# Patient Record
Sex: MALE | Race: ASIAN | NOT HISPANIC OR LATINO | Employment: FULL TIME | URBAN - METROPOLITAN AREA
[De-identification: names, ages, dates, MRNs, and addresses within clinical notes are randomized per-mention and may not be internally consistent; named-entity substitution may affect disease eponyms.]

---

## 2017-01-26 RX ORDER — ISOSORBIDE MONONITRATE 60 MG/1
TABLET, EXTENDED RELEASE ORAL
Qty: 30 TABLET | Refills: 3 | Status: SHIPPED | OUTPATIENT
Start: 2017-01-26 | End: 2017-05-26 | Stop reason: SDUPTHER

## 2017-01-31 RX ORDER — LEVOTHYROXINE SODIUM 125 MCG
TABLET ORAL
Qty: 60 TABLET | Refills: 2 | Status: SHIPPED | OUTPATIENT
Start: 2017-01-31 | End: 2017-04-28 | Stop reason: SDUPTHER

## 2017-04-17 DIAGNOSIS — E03.9 HYPOTHYROIDISM, UNSPECIFIED TYPE: ICD-10-CM

## 2017-04-17 DIAGNOSIS — E29.1 HYPOGONADISM IN MALE: ICD-10-CM

## 2017-04-17 DIAGNOSIS — IMO0001 UNCONTROLLED DIABETES MELLITUS TYPE 2 WITHOUT COMPLICATIONS, UNSPECIFIED LONG TERM INSULIN USE STATUS: ICD-10-CM

## 2017-04-17 DIAGNOSIS — E78.5 HYPERLIPIDEMIA, UNSPECIFIED HYPERLIPIDEMIA TYPE: Primary | ICD-10-CM

## 2017-04-28 RX ORDER — LEVOTHYROXINE SODIUM 125 MCG
TABLET ORAL
Qty: 60 TABLET | Refills: 1 | Status: SHIPPED | OUTPATIENT
Start: 2017-04-28 | End: 2017-06-29 | Stop reason: SDUPTHER

## 2017-05-17 ENCOUNTER — RESULTS ENCOUNTER (OUTPATIENT)
Dept: ENDOCRINOLOGY | Age: 42
End: 2017-05-17

## 2017-05-17 DIAGNOSIS — IMO0001 UNCONTROLLED DIABETES MELLITUS TYPE 2 WITHOUT COMPLICATIONS, UNSPECIFIED LONG TERM INSULIN USE STATUS: ICD-10-CM

## 2017-05-17 DIAGNOSIS — E78.5 HYPERLIPIDEMIA, UNSPECIFIED HYPERLIPIDEMIA TYPE: ICD-10-CM

## 2017-05-17 DIAGNOSIS — E03.9 HYPOTHYROIDISM, UNSPECIFIED TYPE: ICD-10-CM

## 2017-05-17 DIAGNOSIS — E29.1 HYPOGONADISM IN MALE: ICD-10-CM

## 2017-05-26 RX ORDER — ISOSORBIDE MONONITRATE 60 MG/1
TABLET, EXTENDED RELEASE ORAL
Qty: 30 TABLET | Refills: 2 | Status: SHIPPED | OUTPATIENT
Start: 2017-05-26 | End: 2017-08-29 | Stop reason: SDUPTHER

## 2017-06-27 RX ORDER — NITROGLYCERIN 400 UG/1
SPRAY ORAL
Qty: 1 EACH | Refills: 0 | Status: SHIPPED | OUTPATIENT
Start: 2017-06-27 | End: 2017-11-06

## 2017-06-29 RX ORDER — LEVOTHYROXINE SODIUM 125 MCG
TABLET ORAL
Qty: 60 TABLET | Refills: 0 | Status: CANCELLED | OUTPATIENT
Start: 2017-06-29

## 2017-06-29 RX ORDER — LEVOTHYROXINE SODIUM 125 MCG
TABLET ORAL
Qty: 60 TABLET | Refills: 0 | Status: SHIPPED | OUTPATIENT
Start: 2017-06-29 | End: 2017-07-03 | Stop reason: SDUPTHER

## 2017-07-03 RX ORDER — LEVOTHYROXINE SODIUM 125 MCG
TABLET ORAL
Qty: 60 TABLET | Refills: 0 | Status: SHIPPED | OUTPATIENT
Start: 2017-07-03 | End: 2017-07-27

## 2017-07-17 LAB
25(OH)D3+25(OH)D2 SERPL-MCNC: 42.2 NG/ML (ref 30–100)
ALBUMIN SERPL-MCNC: 4.3 G/DL (ref 3.5–5.2)
ALBUMIN/GLOB SERPL: 1.3 G/DL
ALP SERPL-CCNC: 118 U/L (ref 39–117)
ALT SERPL-CCNC: 59 U/L (ref 1–41)
AST SERPL-CCNC: 33 U/L (ref 1–40)
BILIRUB SERPL-MCNC: 0.3 MG/DL (ref 0.1–1.2)
BUN SERPL-MCNC: 10 MG/DL (ref 6–20)
BUN/CREAT SERPL: 14.3 (ref 7–25)
C PEPTIDE SERPL-MCNC: 7.3 NG/ML (ref 1.1–4.4)
CALCIUM SERPL-MCNC: 10 MG/DL (ref 8.6–10.5)
CHLORIDE SERPL-SCNC: 97 MMOL/L (ref 98–107)
CHOLEST SERPL-MCNC: 125 MG/DL (ref 0–200)
CO2 SERPL-SCNC: 23.8 MMOL/L (ref 22–29)
CONV COMMENT: ABNORMAL
CREAT SERPL-MCNC: 0.7 MG/DL (ref 0.76–1.27)
FT4I SERPL CALC-MCNC: 2 (ref 1.2–4.9)
GLOBULIN SER CALC-MCNC: 3.3 GM/DL
GLUCOSE SERPL-MCNC: 161 MG/DL (ref 65–99)
HBA1C MFR BLD: 8.37 % (ref 4.8–5.6)
HCT VFR BLD AUTO: 40.8 % (ref 40.4–52.2)
HDLC SERPL-MCNC: 33 MG/DL (ref 40–60)
HGB BLD-MCNC: 13.3 G/DL (ref 13.7–17.6)
LDLC SERPL CALC-MCNC: 57 MG/DL (ref 0–100)
MICROALBUMIN UR-MCNC: 4.6 UG/ML
POTASSIUM SERPL-SCNC: 4.3 MMOL/L (ref 3.5–5.2)
PROT SERPL-MCNC: 7.6 G/DL (ref 6–8.5)
PSA SERPL-MCNC: 0.07 NG/ML (ref 0–4)
SHBG SERPL-SCNC: 29.8 NMOL/L (ref 16.5–55.9)
SODIUM SERPL-SCNC: 137 MMOL/L (ref 136–145)
T3RU NFR SERPL: 25 % (ref 24–39)
T4 FREE SERPL-MCNC: 1.15 NG/DL (ref 0.93–1.7)
T4 SERPL-MCNC: 7.8 UG/DL (ref 4.5–12)
TESTOST FREE SERPL-MCNC: 5.6 PG/ML (ref 6.8–21.5)
TESTOST SERPL-MCNC: 140 NG/DL (ref 348–1197)
THYROGLOB AB SERPL-ACNC: <1 IU/ML
THYROGLOB AB SERPL-ACNC: <1 IU/ML (ref 0–0.9)
THYROGLOB SERPL-MCNC: 0.2 NG/ML
THYROGLOB SERPL-MCNC: NORMAL NG/ML
THYROPEROXIDASE AB SERPL-ACNC: 16 IU/ML (ref 0–34)
TRIGL SERPL-MCNC: 174 MG/DL (ref 0–150)
TSH SERPL DL<=0.005 MIU/L-ACNC: 6.43 UIU/ML (ref 0.45–4.5)
VLDLC SERPL CALC-MCNC: 34.8 MG/DL (ref 5–40)

## 2017-07-27 ENCOUNTER — OFFICE VISIT (OUTPATIENT)
Dept: ENDOCRINOLOGY | Age: 42
End: 2017-07-27

## 2017-07-27 VITALS
HEIGHT: 74 IN | SYSTOLIC BLOOD PRESSURE: 130 MMHG | WEIGHT: 315 LBS | BODY MASS INDEX: 40.43 KG/M2 | DIASTOLIC BLOOD PRESSURE: 80 MMHG

## 2017-07-27 DIAGNOSIS — E03.9 HYPOTHYROIDISM, UNSPECIFIED TYPE: ICD-10-CM

## 2017-07-27 DIAGNOSIS — E78.5 HYPERLIPIDEMIA, UNSPECIFIED HYPERLIPIDEMIA TYPE: ICD-10-CM

## 2017-07-27 DIAGNOSIS — IMO0001 UNCONTROLLED TYPE 2 DIABETES MELLITUS WITHOUT COMPLICATION, WITH LONG-TERM CURRENT USE OF INSULIN: Primary | ICD-10-CM

## 2017-07-27 DIAGNOSIS — I10 ESSENTIAL HYPERTENSION: ICD-10-CM

## 2017-07-27 DIAGNOSIS — E29.1 HYPOGONADISM IN MALE: ICD-10-CM

## 2017-07-27 DIAGNOSIS — E66.01 MORBID OBESITY DUE TO EXCESS CALORIES (HCC): ICD-10-CM

## 2017-07-27 PROCEDURE — 99214 OFFICE O/P EST MOD 30 MIN: CPT | Performed by: NURSE PRACTITIONER

## 2017-07-27 RX ORDER — FLUTICASONE PROPIONATE 50 MCG
1 SPRAY, SUSPENSION (ML) NASAL AS NEEDED
COMMUNITY
Start: 2017-06-28

## 2017-07-27 RX ORDER — IBUPROFEN 200 MG
CAPSULE ORAL AS NEEDED
COMMUNITY
Start: 2017-06-28 | End: 2022-10-08 | Stop reason: HOSPADM

## 2017-07-27 RX ORDER — LEVOTHYROXINE SODIUM 150 MCG
300 TABLET ORAL DAILY
Qty: 60 TABLET | Refills: 5 | Status: SHIPPED | OUTPATIENT
Start: 2017-07-27 | End: 2017-07-31 | Stop reason: SDUPTHER

## 2017-07-27 RX ORDER — ICOSAPENT ETHYL 1000 MG/1
2 CAPSULE ORAL 2 TIMES DAILY WITH MEALS
Qty: 120 CAPSULE | Refills: 5 | Status: SHIPPED | OUTPATIENT
Start: 2017-07-27 | End: 2017-11-06

## 2017-07-27 RX ORDER — OXYBUTYNIN CHLORIDE 10 MG/1
10 TABLET, EXTENDED RELEASE ORAL AS NEEDED
COMMUNITY
Start: 2017-07-09 | End: 2022-10-08 | Stop reason: HOSPADM

## 2017-07-27 RX ORDER — BLOOD SUGAR DIAGNOSTIC
STRIP MISCELLANEOUS
COMMUNITY
Start: 2017-07-26 | End: 2018-04-26 | Stop reason: SDUPTHER

## 2017-07-27 RX ORDER — LANCETS
EACH MISCELLANEOUS
COMMUNITY
Start: 2017-07-06 | End: 2018-04-26 | Stop reason: SDUPTHER

## 2017-07-27 RX ORDER — ERYTHROMYCIN AND BENZOYL PEROXIDE 30; 50 MG/G; MG/G
GEL TOPICAL AS NEEDED
COMMUNITY
Start: 2017-06-27

## 2017-07-27 RX ORDER — L. ACIDOPHILUS/L.BULGARICUS 1MM CELL
1 TABLET ORAL AS NEEDED
Status: ON HOLD | COMMUNITY
Start: 2017-06-29 | End: 2022-10-07

## 2017-07-27 RX ORDER — TRIAMCINOLONE ACETONIDE 1 MG/G
CREAM TOPICAL AS NEEDED
COMMUNITY
Start: 2017-06-27 | End: 2017-10-31 | Stop reason: SDUPTHER

## 2017-07-27 RX ORDER — IRBESARTAN AND HYDROCHLOROTHIAZIDE 300; 12.5 MG/1; MG/1
1 TABLET, FILM COATED ORAL DAILY
COMMUNITY
Start: 2017-07-06 | End: 2017-07-27 | Stop reason: DRUGHIGH

## 2017-07-27 RX ORDER — CLOBETASOL PROPIONATE 0.5 MG/G
CREAM TOPICAL AS NEEDED
COMMUNITY
Start: 2017-06-27 | End: 2017-10-31 | Stop reason: SDUPTHER

## 2017-07-27 RX ORDER — FOLIC ACID 1 MG/1
1 TABLET ORAL DAILY
COMMUNITY
Start: 2017-07-06 | End: 2022-10-08 | Stop reason: HOSPADM

## 2017-07-27 RX ORDER — ATORVASTATIN CALCIUM 40 MG/1
40 TABLET, FILM COATED ORAL DAILY
COMMUNITY
Start: 2017-05-03 | End: 2022-10-08 | Stop reason: HOSPADM

## 2017-07-27 RX ORDER — IRBESARTAN AND HYDROCHLOROTHIAZIDE 300; 12.5 MG/1; MG/1
1 TABLET, FILM COATED ORAL DAILY
COMMUNITY
End: 2019-10-03 | Stop reason: HOSPADM

## 2017-07-27 RX ORDER — LEVOTHYROXINE SODIUM 150 MCG
150 TABLET ORAL DAILY
Qty: 30 TABLET | Refills: 5 | Status: SHIPPED | OUTPATIENT
Start: 2017-07-27 | End: 2017-07-27 | Stop reason: SDUPTHER

## 2017-07-27 RX ORDER — PSYLLIUM SEED
PACKET (EA) ORAL 2 TIMES DAILY
COMMUNITY
Start: 2017-06-28

## 2017-07-27 RX ORDER — EMPAGLIFLOZIN 25 MG/1
1 TABLET, FILM COATED ORAL DAILY
Qty: 30 TABLET | Refills: 5 | Status: SHIPPED | OUTPATIENT
Start: 2017-07-27 | End: 2017-07-28 | Stop reason: SDUPTHER

## 2017-07-27 NOTE — PROGRESS NOTES
"Subjective   Jonel Garza is a 42 y.o. male is here today for follow-up.  Chief Complaint   Patient presents with   • Diabetes     recent labs, testing Bg 3 times daily, pt did not bring meter   • Hypothyroidism     patient has a form to be signed off on   • Hypogonadism   • Hyperlipidemia     /80  Ht 74\" (188 cm)  Wt (!) 352 lb 6.4 oz (160 kg)  BMI 45.25 kg/m2  Current Outpatient Prescriptions on File Prior to Visit   Medication Sig   • aspirin 81 MG tablet Take 81 mg by mouth daily.   • cetirizine (ZyrTEC) 10 MG tablet Take 10 mg by mouth Daily.   • CLICKFINE PEN NEEDLES 31G X 6 MM misc    • ezetimibe (ZETIA) 10 MG tablet Take 10 mg by mouth daily.   • Insulin Glargine (LANTUS SOLOSTAR) 100 UNIT/ML injection pen Inject 110 Units under the skin 2 (Two) Times a Day. PA HAS BEEN APPROVED (Patient taking differently: Inject 120 Units under the skin 2 (Two) Times a Day. PA HAS BEEN APPROVED)   • Insulin Lispro (HUMALOG) 100 UNIT/ML solution pen-injector UP TO30 UNITS AC MEALS TID (Patient taking differently: Inject 40 Units under the skin 3 (Three) Times a Day. UP TO30 UNITS AC MEALS TID)   • isosorbide mononitrate (IMDUR) 60 MG 24 hr tablet TAKE ONE TABLET BY MOUTH DAILY   • metFORMIN (FORTAMET) 1000 MG (OSM) 24 hr tablet 2 PO DAILY WITH FOOD   • metoprolol succinate XL (TOPROL-XL) 100 MG 24 hr tablet Take 100 mg by mouth daily.   • nitroglycerin (NITROLINGUAL) 0.4 MG/SPRAY spray USE ONE SPRAY UNDER THE TONGUE AS NEEDED FOR CHEST PAIN   • nystatin (MYCOSTATIN) cream Apply topically 2 (two) times a day.   • prasugrel (EFFIENT) 10 MG tablet Take 1 tablet by mouth daily.   • sertraline (ZOLOFT) 100 MG tablet Take 100 mg by mouth daily.   • [DISCONTINUED] SYNTHROID 125 MCG tablet Take two tablets by mouth daily. PATIENT MUST KEEP APPT IN ORDER TO OBTAIN FURTHER REFILLS   • [DISCONTINUED] atorvastatin (LIPITOR) 80 MG tablet Take 1 tablet by mouth daily. (Patient taking differently: Take 40 mg by mouth Daily.)   • " [DISCONTINUED] Canagliflozin 100 MG tablet Take 1 tablet daily   • [DISCONTINUED] irbesartan (AVAPRO) 300 MG tablet Take 300 mg by mouth Daily.     No current facility-administered medications on file prior to visit.      Family History   Problem Relation Age of Onset   • Hypertension Mother    • Cancer Mother      Pancreatic   • Heart disease Mother    • Heart disease Father    • Hypertension Sister    • Hypertension Brother    • Diabetes Brother      type 2     Social History   Substance Use Topics   • Smoking status: Never Smoker   • Smokeless tobacco: None   • Alcohol use No     Allergies   Allergen Reactions   • Ace Inhibitors Anaphylaxis   • Iodinated Diagnostic Agents Nausea And Vomiting   • Quinapril Anaphylaxis and Swelling     angioedema   • Accupril [Quinapril Hcl]    • Crestor [Rosuvastatin]    • Dobutamine Nausea And Vomiting   • Rosuvastatin Calcium      Other reaction(s): Other (See Comments)  rhabdomyolisis   • Rosuvastatin Calcium Other (See Comments)     rhabdomyolisis   • Latex Rash       History of Present Illness   Encounter Diagnoses   Name Primary?   • Uncontrolled type 2 diabetes mellitus without complication, with long-term current use of insulin Yes   • Essential hypertension    • Hyperlipidemia, unspecified hyperlipidemia type    • Hypogonadism in male    • Morbid obesity due to excess calories    • Hypothyroidism, unspecified type      This is a 42-year-old male patient here today for routine follow-up visit.  He has been seen for the above-mentioned problems.  He is accompanied by his girlfriend at today's visit.  He did not bring his blood glucose meter in.  He states his morning fasting blood sugars are typically in the 140 range.  He states he is seeing high postprandial blood sugars of around 210-220 mg/dL.  He states he is checking his blood sugars 4 times daily.  He is having to correct for high blood sugars roughly 3 times daily.  We discussed being more proactive with preventing  his blood sugars getting too high rather than reactive to chasing them.  He is willing to attend diabetes education classes.  He was provided a schedule at today's visit.  His triglycerides are slightly elevated and he was prescribed vascepa 1 g 2 pills daily.  He was prescribed Jardiance to help bring his diabetes under better control.  His hemoglobin A1c has gone up and is not at goal.  His most recent TSH reflex that he is hypothyroid.  He states he takes his thyroid medication daily consistently.  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    The following portions of the patient's history were reviewed and updated as appropriate: allergies, current medications, past family history, past medical history, past social history, past surgical history and problem list.    Review of Systems   Constitutional: Negative for fatigue.   HENT: Negative for trouble swallowing.    Eyes: Negative for visual disturbance.   Respiratory: Negative for shortness of breath.    Cardiovascular: Negative for leg swelling.   Endocrine: Negative for polyphagia.   Skin: Negative for wound.   Neurological: Negative for numbness.       Objective   Physical Exam   Constitutional: He is oriented to person, place, and time. He appears well-developed and well-nourished. No distress.   HENT:   Head: Normocephalic and atraumatic.   Right Ear: External ear normal.   Left Ear: External ear normal.   Nose: Nose normal.   Eyes: Pupils are equal, round, and reactive to light. Right eye exhibits no discharge. Left eye exhibits no discharge.   Neck: Normal range of motion. Neck supple. Carotid bruit is not present. No tracheal deviation, no edema and no erythema present. No thyromegaly present.   Cardiovascular: Normal rate, regular rhythm, normal heart sounds and intact distal pulses.  Exam reveals no gallop and no friction rub.    No murmur heard.  Pulmonary/Chest: Effort normal and breath sounds normal. No  respiratory distress. He has no wheezes. He has no rales.   Abdominal: Soft. Bowel sounds are normal. He exhibits no distension. There is no tenderness.   Musculoskeletal: Normal range of motion. He exhibits edema. He exhibits no deformity.   1 + pedal edema.  States he will sometimes wear compression stockings   Lymphadenopathy:     He has no cervical adenopathy.   Neurological: He is alert and oriented to person, place, and time. Coordination normal.   Skin: Skin is warm and dry. No rash noted. He is not diaphoretic. No erythema. No pallor.   ACANTHOSIS NIGRICANS   Psychiatric: He has a normal mood and affect. His behavior is normal. Judgment and thought content normal.   Nursing note and vitals reviewed.    Results for orders placed or performed in visit on 05/17/17   TestT+TestF+SHBG   Result Value Ref Range    Testosterone, Total 140 (L) 348 - 1197 ng/dL    Comment Comment     Testosterone, Free 5.6 (L) 6.8 - 21.5 pg/mL    Sex Hormone Binding Globulin 29.8 16.5 - 55.9 nmol/L   Lipid Panel   Result Value Ref Range    Total Cholesterol 125 0 - 200 mg/dL    Triglycerides 174 (H) 0 - 150 mg/dL    HDL Cholesterol 33 (L) 40 - 60 mg/dL    VLDL Cholesterol 34.8 5 - 40 mg/dL    LDL Cholesterol  57 0 - 100 mg/dL   Comprehensive Metabolic Panel   Result Value Ref Range    Glucose 161 (H) 65 - 99 mg/dL    BUN 10 6 - 20 mg/dL    Creatinine 0.70 (L) 0.76 - 1.27 mg/dL    eGFR Non African Am 123 >60 mL/min/1.73    eGFR African Am 149 >60 mL/min/1.73    BUN/Creatinine Ratio 14.3 7.0 - 25.0    Sodium 137 136 - 145 mmol/L    Potassium 4.3 3.5 - 5.2 mmol/L    Chloride 97 (L) 98 - 107 mmol/L    Total CO2 23.8 22.0 - 29.0 mmol/L    Calcium 10.0 8.6 - 10.5 mg/dL    Total Protein 7.6 6.0 - 8.5 g/dL    Albumin 4.30 3.50 - 5.20 g/dL    Globulin 3.3 gm/dL    A/G Ratio 1.3 g/dL    Total Bilirubin 0.3 0.1 - 1.2 mg/dL    Alkaline Phosphatase 118 (H) 39 - 117 U/L    AST (SGOT) 33 1 - 40 U/L    ALT (SGPT) 59 (H) 1 - 41 U/L   Thyroid Panel  With TSH   Result Value Ref Range    TSH 6.430 (H) 0.450 - 4.500 uIU/mL    T4, Total 7.8 4.5 - 12.0 ug/dL    T3 Uptake 25 24 - 39 %    Free Thyroxine Index 2.0 1.2 - 4.9   Comprehensive Thyroglobulin   Result Value Ref Range    Thyroglobulin Ab <1.0 IU/mL    Thyroglobulin 0.2 ng/mL    Thyroglobulin (TG-SARA) Comment ng/mL   Hemoglobin & Hematocrit, Blood   Result Value Ref Range    Hemoglobin 13.3 (L) 13.7 - 17.6 g/dL    Hematocrit 40.8 40.4 - 52.2 %   Hemoglobin A1c   Result Value Ref Range    Hemoglobin A1C 8.37 (H) 4.80 - 5.60 %   T4, Free   Result Value Ref Range    Free T4 1.15 0.93 - 1.70 ng/dL   PSA   Result Value Ref Range    PSA 0.065 0.000 - 4.000 ng/mL   C-Peptide   Result Value Ref Range    C-Peptide 7.3 (H) 1.1 - 4.4 ng/mL   Vitamin D 25 Hydroxy   Result Value Ref Range    25 Hydroxy, Vitamin D 42.2 30.0 - 100.0 ng/mL   MicroAlbumin, Urine, Random   Result Value Ref Range    Microalbumin, Urine 4.6 Not Estab. ug/mL   Thyroid Antibodies   Result Value Ref Range    Thyroid Peroxidase Antibody 16 0 - 34 IU/mL    Thyroglobulin Ab <1.0 0.0 - 0.9 IU/mL         Assessment/Plan   Jonel was seen today for diabetes, hypothyroidism, hypogonadism and hyperlipidemia.    Diagnoses and all orders for this visit:    Uncontrolled type 2 diabetes mellitus without complication, with long-term current use of insulin    Essential hypertension    Hyperlipidemia, unspecified hyperlipidemia type    Hypogonadism in male    Morbid obesity due to excess calories    Hypothyroidism, unspecified type      In summary, patient was seen and examined.  His recent labs were reviewed his provided a copy.  His TSH is too high I've increased his thyroid medication 250 µg 2 pills once daily on empty stomach.  I've asked that his prescriptions be changed to insulin pen this visit vitals that were listed in his electronic medical record.  A referral was made for diabetes education classes.  Jardiance 25 mg once daily to help lower her A1c is  well as to help reduce some swelling in his lower extremities.  He does complain of some varicose veins and he will need to follow-up with primary care provider about further evaluation for this.  He does have a history of heart disease and family history of heart disease.  He does have a history of having stents.  He was prescribed vascepa 1 g 2 pills twice daily for high triglycerides.  His vitamin D is stable.  He has lost some weight however he does need to increase his exercise.  Blood pressure is stable range.  He does have a history of hypogonadism however he feels that the risk of having heart disease is not worth the benefit of treating a low testosterone.  He is to follow-up in 4 months with labs prior.  I've also asked that he follow up in 2 months to have his labs repeated to evaluate his thyroid levels.  Diet and exercise were discussed at today's visit.

## 2017-07-28 RX ORDER — EMPAGLIFLOZIN 25 MG/1
1 TABLET, FILM COATED ORAL DAILY
Qty: 30 TABLET | Refills: 5 | Status: SHIPPED | OUTPATIENT
Start: 2017-07-28 | End: 2017-11-06

## 2017-07-31 RX ORDER — LEVOTHYROXINE SODIUM 125 MCG
TABLET ORAL
Qty: 60 TABLET | Refills: 0 | OUTPATIENT
Start: 2017-07-31

## 2017-07-31 RX ORDER — LEVOTHYROXINE SODIUM 150 MCG
300 TABLET ORAL DAILY
Qty: 60 TABLET | Refills: 5 | Status: SHIPPED | OUTPATIENT
Start: 2017-07-31 | End: 2017-10-31 | Stop reason: DRUGHIGH

## 2017-08-29 RX ORDER — ISOSORBIDE MONONITRATE 60 MG/1
TABLET, EXTENDED RELEASE ORAL
Qty: 30 TABLET | Refills: 0 | Status: SHIPPED | OUTPATIENT
Start: 2017-08-29 | End: 2017-09-24 | Stop reason: SDUPTHER

## 2017-09-06 ENCOUNTER — HOSPITAL ENCOUNTER (OUTPATIENT)
Dept: DIABETES SERVICES | Facility: HOSPITAL | Age: 42
Discharge: HOME | End: 2017-09-06
Admitting: NURSE PRACTITIONER

## 2017-09-06 PROCEDURE — G0109 DIAB MANAGE TRN IND/GROUP: HCPCS

## 2017-09-13 ENCOUNTER — HOSPITAL ENCOUNTER (OUTPATIENT)
Dept: DIABETES SERVICES | Facility: HOSPITAL | Age: 42
Discharge: HOME | End: 2017-09-13
Admitting: NURSE PRACTITIONER

## 2017-09-13 PROCEDURE — G0109 DIAB MANAGE TRN IND/GROUP: HCPCS

## 2017-09-20 ENCOUNTER — HOSPITAL ENCOUNTER (OUTPATIENT)
Dept: DIABETES SERVICES | Facility: HOSPITAL | Age: 42
Discharge: HOME | End: 2017-09-20
Admitting: NURSE PRACTITIONER

## 2017-09-20 PROCEDURE — G0109 DIAB MANAGE TRN IND/GROUP: HCPCS

## 2017-09-25 RX ORDER — ISOSORBIDE MONONITRATE 60 MG/1
TABLET, EXTENDED RELEASE ORAL
Qty: 30 TABLET | Refills: 0 | Status: SHIPPED | OUTPATIENT
Start: 2017-09-25 | End: 2017-10-25 | Stop reason: SDUPTHER

## 2017-09-25 RX ORDER — PRASUGREL 10 MG/1
TABLET, FILM COATED ORAL
Qty: 30 TABLET | Refills: 0 | Status: SHIPPED | OUTPATIENT
Start: 2017-09-25 | End: 2017-10-29 | Stop reason: SDUPTHER

## 2017-09-27 ENCOUNTER — RESULTS ENCOUNTER (OUTPATIENT)
Dept: ENDOCRINOLOGY | Age: 42
End: 2017-09-27

## 2017-09-27 ENCOUNTER — HOSPITAL ENCOUNTER (OUTPATIENT)
Dept: DIABETES SERVICES | Facility: HOSPITAL | Age: 42
Discharge: HOME | End: 2017-09-27
Admitting: NURSE PRACTITIONER

## 2017-09-27 DIAGNOSIS — E66.01 MORBID OBESITY DUE TO EXCESS CALORIES (HCC): ICD-10-CM

## 2017-09-27 DIAGNOSIS — I10 ESSENTIAL HYPERTENSION: ICD-10-CM

## 2017-09-27 DIAGNOSIS — E29.1 HYPOGONADISM IN MALE: ICD-10-CM

## 2017-09-27 DIAGNOSIS — E03.9 HYPOTHYROIDISM, UNSPECIFIED TYPE: ICD-10-CM

## 2017-09-27 DIAGNOSIS — IMO0001 UNCONTROLLED TYPE 2 DIABETES MELLITUS WITHOUT COMPLICATION, WITH LONG-TERM CURRENT USE OF INSULIN: ICD-10-CM

## 2017-09-27 DIAGNOSIS — E78.5 HYPERLIPIDEMIA, UNSPECIFIED HYPERLIPIDEMIA TYPE: ICD-10-CM

## 2017-09-27 PROCEDURE — G0109 DIAB MANAGE TRN IND/GROUP: HCPCS

## 2017-10-03 LAB
FT4I SERPL CALC-MCNC: 2.4 (ref 1.2–4.9)
T3FREE SERPL-MCNC: 2.8 PG/ML (ref 2–4.4)
T3RU NFR SERPL: 29 % (ref 24–39)
T4 FREE SERPL-MCNC: 1.56 NG/DL (ref 0.93–1.7)
T4 SERPL-MCNC: 8.4 UG/DL (ref 4.5–12)
TSH SERPL DL<=0.005 MIU/L-ACNC: 0.48 UIU/ML (ref 0.45–4.5)

## 2017-10-12 RX ORDER — INSULIN LISPRO 100 [IU]/ML
INJECTION, SOLUTION INTRAVENOUS; SUBCUTANEOUS
Qty: 15 ML | Refills: 4 | Status: SHIPPED | OUTPATIENT
Start: 2017-10-12 | End: 2017-11-06

## 2017-10-19 ENCOUNTER — TRANSCRIBE ORDERS (OUTPATIENT)
Dept: ADMINISTRATIVE | Facility: HOSPITAL | Age: 42
End: 2017-10-19

## 2017-10-19 DIAGNOSIS — N18.9 ANEMIA DUE TO CHRONIC KIDNEY DISEASE: ICD-10-CM

## 2017-10-19 DIAGNOSIS — E61.1 IRON DEFICIENCY: Primary | ICD-10-CM

## 2017-10-19 DIAGNOSIS — D63.1 ANEMIA DUE TO CHRONIC KIDNEY DISEASE: ICD-10-CM

## 2017-10-25 RX ORDER — ISOSORBIDE MONONITRATE 60 MG/1
TABLET, EXTENDED RELEASE ORAL
Qty: 30 TABLET | Refills: 1 | Status: SHIPPED | OUTPATIENT
Start: 2017-10-25 | End: 2017-11-06

## 2017-10-26 PROBLEM — D64.9 ANEMIA: Status: ACTIVE | Noted: 2017-10-26

## 2017-10-27 ENCOUNTER — HOSPITAL ENCOUNTER (OUTPATIENT)
Dept: INFUSION THERAPY | Facility: HOSPITAL | Age: 42
Discharge: HOME | End: 2017-10-27
Attending: INTERNAL MEDICINE | Admitting: INTERNAL MEDICINE

## 2017-10-27 VITALS
RESPIRATION RATE: 20 BRPM | OXYGEN SATURATION: 98 % | SYSTOLIC BLOOD PRESSURE: 110 MMHG | HEART RATE: 61 BPM | DIASTOLIC BLOOD PRESSURE: 62 MMHG | TEMPERATURE: 98.3 F

## 2017-10-27 DIAGNOSIS — D63.1 ANEMIA IN CHRONIC KIDNEY DISEASE, UNSPECIFIED CKD STAGE: ICD-10-CM

## 2017-10-27 DIAGNOSIS — N18.9 ANEMIA IN CHRONIC KIDNEY DISEASE, UNSPECIFIED CKD STAGE: ICD-10-CM

## 2017-10-27 PROCEDURE — 25010000002 FERUMOXYTOL 510 MG/17ML SOLUTION 510 MG VIAL: Performed by: INTERNAL MEDICINE

## 2017-10-27 PROCEDURE — 96375 TX/PRO/DX INJ NEW DRUG ADDON: CPT

## 2017-10-27 PROCEDURE — 25010000002 DIPHENHYDRAMINE PER 50 MG: Performed by: INTERNAL MEDICINE

## 2017-10-27 PROCEDURE — 96365 THER/PROPH/DIAG IV INF INIT: CPT

## 2017-10-27 PROCEDURE — 96374 THER/PROPH/DIAG INJ IV PUSH: CPT

## 2017-10-27 RX ORDER — DIPHENHYDRAMINE HYDROCHLORIDE 50 MG/ML
25 INJECTION INTRAMUSCULAR; INTRAVENOUS ONCE
Status: DISCONTINUED | OUTPATIENT
Start: 2017-10-27 | End: 2017-10-29 | Stop reason: HOSPADM

## 2017-10-27 RX ORDER — DIPHENHYDRAMINE HYDROCHLORIDE 50 MG/ML
25 INJECTION INTRAMUSCULAR; INTRAVENOUS ONCE
Status: COMPLETED | OUTPATIENT
Start: 2017-10-27 | End: 2017-10-27

## 2017-10-27 RX ORDER — ACETAMINOPHEN 325 MG/1
650 TABLET ORAL ONCE
Status: COMPLETED | OUTPATIENT
Start: 2017-10-27 | End: 2017-10-27

## 2017-10-27 RX ADMIN — FERUMOXYTOL 510 MG: 510 INJECTION INTRAVENOUS at 09:20

## 2017-10-27 RX ADMIN — ACETAMINOPHEN 650 MG: 325 TABLET ORAL at 09:00

## 2017-10-27 RX ADMIN — DIPHENHYDRAMINE HYDROCHLORIDE 25 MG: 50 INJECTION, SOLUTION INTRAMUSCULAR; INTRAVENOUS at 09:01

## 2017-10-27 NOTE — PROGRESS NOTES
PATIENT TOLERATED IV FERAHEME WITHOUT DIFFICULTY. 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 DR. SPANN CALLED AND HIS PARTNER RESPONDED WITH ORDER. PATIENT ALSO REQUESTED THAT INFUSION BE RUN SLOWER THAN USUAL RATE. PATIENT VSS DURING AND S/P INFUSION OBSERVATION X 30 MINUTES. DC'D HOME AMB WITH WIFE AFTER APPOINTMENT COMPLETED.

## 2017-10-27 NOTE — PATIENT INSTRUCTIONS
Ferumoxytol injection  What is this medicine?  FERUMOXYTOL is an iron complex. Iron is used to make healthy red blood cells, which carry oxygen and nutrients throughout the body. This medicine is used to treat iron deficiency anemia in people with chronic kidney disease.  This medicine may be used for other purposes; ask your health care provider or pharmacist if you have questions.  COMMON BRAND NAME(S): Feraheme  What should I tell my health care provider before I take this medicine?  They need to know if you have any of these conditions:  -anemia not caused by low iron levels  -high levels of iron in the blood  -magnetic resonance imaging (MRI) test scheduled  -an unusual or allergic reaction to iron, other medicines, foods, dyes, or preservatives  -pregnant or trying to get pregnant  -breast-feeding  How should I use this medicine?  This medicine is for injection into a vein. It is given by a health care professional in a hospital or clinic setting.  Talk to your pediatrician regarding the use of this medicine in children. Special care may be needed.  Overdosage: If you think you have taken too much of this medicine contact a poison control center or emergency room at once.  NOTE: This medicine is only for you. Do not share this medicine with others.  What if I miss a dose?  It is important not to miss your dose. Call your doctor or health care professional if you are unable to keep an appointment.  What may interact with this medicine?  This medicine may interact with the following medications:  -other iron products  This list may not describe all possible interactions. Give your health care provider a list of all the medicines, herbs, non-prescription drugs, or dietary supplements you use. Also tell them if you smoke, drink alcohol, or use illegal drugs. Some items may interact with your medicine.  What should I watch for while using this medicine?  Visit your doctor or healthcare professional regularly. Tell  your doctor or healthcare professional if your symptoms do not start to get better or if they get worse. You may need blood work done while you are taking this medicine.  You may need to follow a special diet. Talk to your doctor. Foods that contain iron include: whole grains/cereals, dried fruits, beans, or peas, leafy green vegetables, and organ meats (liver, kidney).  What side effects may I notice from receiving this medicine?  Side effects that you should report to your doctor or health care professional as soon as possible:  -allergic reactions like skin rash, itching or hives, swelling of the face, lips, or tongue  -breathing problems  -changes in blood pressure  -feeling faint or lightheaded, falls  -fever or chills  -flushing, sweating, or hot feelings  -swelling of the ankles or feet  Side effects that usually do not require medical attention (report to your doctor or health care professional if they continue or are bothersome):  -diarrhea  -headache  -nausea, vomiting  -stomach pain  This list may not describe all possible side effects. Call your doctor for medical advice about side effects. You may report side effects to FDA at 8-629-FDA-8032.  Where should I keep my medicine?  This drug is given in a hospital or clinic and will not be stored at home.  NOTE: This sheet is a summary. It may not cover all possible information. If you have questions about this medicine, talk to your doctor, pharmacist, or health care provider.     © 2017, [employer]. (2017-01-19 12:41:49)  Iron Deficiency Anemia, Adult  Anemia is a condition in which there are less red blood cells or hemoglobin in the blood than normal. Hemoglobin is the part of red blood cells that carries oxygen. Iron deficiency anemia is anemia caused by too little iron. It is the most common type of anemia. It may leave you tired and short of breath.  CAUSES   · Lack of iron in the diet.  · Poor absorption of iron, as seen with intestinal  disorders.  · Intestinal bleeding.  · Heavy periods.  SIGNS AND SYMPTOMS   Mild anemia may not be noticeable. Symptoms may include:  · Fatigue.  · Headache.  · Pale skin.  · Weakness.  · Tiredness.  · Shortness of breath.  · Dizziness.  · Cold hands and feet.  · Fast or irregular heartbeat.  DIAGNOSIS   Diagnosis requires a thorough evaluation and physical exam by your health care provider. Blood tests are generally used to confirm iron deficiency anemia. Additional tests may be done to find the underlying cause of your anemia. These may include:  · Testing for blood in the stool (fecal occult blood test).  · A procedure to see inside the colon and rectum (colonoscopy).  · A procedure to see inside the esophagus and stomach (endoscopy).  TREATMENT   Iron deficiency anemia is treated by correcting the cause of the deficiency. Treatment may involve:  · Adding iron-rich foods to your diet.  · Taking iron supplements. Pregnant or breastfeeding women need to take extra iron because their normal diet usually does not provide the required amount.  · Taking vitamins. Vitamin C improves the absorption of iron. Your health care provider may recommend that you take your iron tablets with a glass of orange juice or vitamin C supplement.  · Medicines to make heavy menstrual flow lighter.  · Surgery.  HOME CARE INSTRUCTIONS   · Take iron as directed by your health care provider.    If you cannot tolerate taking iron supplements by mouth, talk to your health care provider about taking them through a vein (intravenously) or an injection into a muscle.    For the best iron absorption, iron supplements should be taken on an empty stomach. If you cannot tolerate them on an empty stomach, you may need to take them with food.    Do not drink milk or take antacids at the same time as your iron supplements. Milk and antacids may interfere with the absorption of iron.    Iron supplements can cause constipation. Make sure to include fiber  in your diet to prevent constipation. A stool softener may also be recommended.  · Take vitamins as directed by your health care provider.  · Eat a diet rich in iron. Foods high in iron include liver, lean beef, whole-grain bread, eggs, dried fruit, and dark green leafy vegetables.  SEEK IMMEDIATE MEDICAL CARE IF:   · You faint. If this happens, do not drive. Call your local emergency services (911 in [place] if no other help is available.  · You have chest pain.  · You feel nauseous or vomit.  · You have severe or increased shortness of breath with activity.  · You feel weak.  · You have a rapid heartbeat.  · You have unexplained sweating.  · You become light-headed when getting up from a chair or bed.  MAKE SURE YOU:   · Understand these instructions.  · Will watch your condition.  · Will get help right away if you are not doing well or get worse.     This information is not intended to replace advice given to you by your health care provider. Make sure you discuss any questions you have with your health care provider.     Document Released: 12/15/2001 Document Revised: 01/08/2016 Document Reviewed: 08/25/2014  [employer] Patient Education ©2017 [employer]

## 2017-10-30 RX ORDER — PRASUGREL 10 MG/1
TABLET, FILM COATED ORAL
Qty: 30 TABLET | Refills: 0 | Status: SHIPPED | OUTPATIENT
Start: 2017-10-30 | End: 2017-11-06

## 2017-10-31 ENCOUNTER — TRANSCRIBE ORDERS (OUTPATIENT)
Dept: CARDIOLOGY | Facility: CLINIC | Age: 42
End: 2017-10-31

## 2017-10-31 ENCOUNTER — OFFICE VISIT (OUTPATIENT)
Dept: CARDIOLOGY | Facility: CLINIC | Age: 42
End: 2017-10-31

## 2017-10-31 ENCOUNTER — LAB (OUTPATIENT)
Dept: LAB | Facility: HOSPITAL | Age: 42
End: 2017-10-31
Attending: INTERNAL MEDICINE

## 2017-10-31 VITALS
WEIGHT: 315 LBS | HEIGHT: 74 IN | DIASTOLIC BLOOD PRESSURE: 82 MMHG | BODY MASS INDEX: 40.43 KG/M2 | HEART RATE: 70 BPM | SYSTOLIC BLOOD PRESSURE: 108 MMHG

## 2017-10-31 DIAGNOSIS — Z13.6 SCREENING FOR ISCHEMIC HEART DISEASE: ICD-10-CM

## 2017-10-31 DIAGNOSIS — Z01.810 PRE-OPERATIVE CARDIOVASCULAR EXAMINATION: Primary | ICD-10-CM

## 2017-10-31 DIAGNOSIS — G47.33 OBSTRUCTIVE SLEEP APNEA SYNDROME: ICD-10-CM

## 2017-10-31 DIAGNOSIS — K90.0 CELIAC DISEASE: ICD-10-CM

## 2017-10-31 DIAGNOSIS — E66.01 MORBID OBESITY DUE TO EXCESS CALORIES (HCC): ICD-10-CM

## 2017-10-31 DIAGNOSIS — Z95.5 PRESENCE OF STENT IN CORONARY ARTERY: ICD-10-CM

## 2017-10-31 DIAGNOSIS — Z01.810 PRE-OPERATIVE CARDIOVASCULAR EXAMINATION: ICD-10-CM

## 2017-10-31 DIAGNOSIS — R07.2 PRECORDIAL PAIN: ICD-10-CM

## 2017-10-31 DIAGNOSIS — E78.5 HYPERLIPIDEMIA, UNSPECIFIED HYPERLIPIDEMIA TYPE: ICD-10-CM

## 2017-10-31 DIAGNOSIS — I10 ESSENTIAL HYPERTENSION: ICD-10-CM

## 2017-10-31 DIAGNOSIS — I25.10 CORONARY ARTERIOSCLEROSIS IN NATIVE ARTERY: Primary | ICD-10-CM

## 2017-10-31 LAB
ANION GAP SERPL CALCULATED.3IONS-SCNC: 11.5 MMOL/L
BASOPHILS # BLD AUTO: 0.06 10*3/MM3 (ref 0–0.2)
BASOPHILS NFR BLD AUTO: 0.5 % (ref 0–1.5)
BUN BLD-MCNC: 12 MG/DL (ref 6–20)
BUN/CREAT SERPL: 17.4 (ref 7–25)
CALCIUM SPEC-SCNC: 9.7 MG/DL (ref 8.6–10.5)
CHLORIDE SERPL-SCNC: 97 MMOL/L (ref 98–107)
CO2 SERPL-SCNC: 26.5 MMOL/L (ref 22–29)
CREAT BLD-MCNC: 0.69 MG/DL (ref 0.76–1.27)
DEPRECATED RDW RBC AUTO: 42.5 FL (ref 37–54)
EOSINOPHIL # BLD AUTO: 0.45 10*3/MM3 (ref 0–0.7)
EOSINOPHIL NFR BLD AUTO: 3.5 % (ref 0.3–6.2)
ERYTHROCYTE [DISTWIDTH] IN BLOOD BY AUTOMATED COUNT: 14.7 % (ref 11.5–14.5)
GFR SERPL CREATININE-BSD FRML MDRD: 125 ML/MIN/1.73
GLUCOSE BLD-MCNC: 130 MG/DL (ref 65–99)
HCT VFR BLD AUTO: 38.6 % (ref 40.4–52.2)
HGB BLD-MCNC: 12.7 G/DL (ref 13.7–17.6)
IMM GRANULOCYTES # BLD: 0.03 10*3/MM3 (ref 0–0.03)
IMM GRANULOCYTES NFR BLD: 0.2 % (ref 0–0.5)
LYMPHOCYTES # BLD AUTO: 3.63 10*3/MM3 (ref 0.9–4.8)
LYMPHOCYTES NFR BLD AUTO: 28.1 % (ref 19.6–45.3)
MCH RBC QN AUTO: 26.3 PG (ref 27–32.7)
MCHC RBC AUTO-ENTMCNC: 32.9 G/DL (ref 32.6–36.4)
MCV RBC AUTO: 80.1 FL (ref 79.8–96.2)
MONOCYTES # BLD AUTO: 0.76 10*3/MM3 (ref 0.2–1.2)
MONOCYTES NFR BLD AUTO: 5.9 % (ref 5–12)
NEUTROPHILS # BLD AUTO: 7.99 10*3/MM3 (ref 1.9–8.1)
NEUTROPHILS NFR BLD AUTO: 61.8 % (ref 42.7–76)
PLATELET # BLD AUTO: 322 10*3/MM3 (ref 140–500)
PMV BLD AUTO: 9.1 FL (ref 6–12)
POTASSIUM BLD-SCNC: 3.7 MMOL/L (ref 3.5–5.2)
RBC # BLD AUTO: 4.82 10*6/MM3 (ref 4.6–6)
SODIUM BLD-SCNC: 135 MMOL/L (ref 136–145)
WBC NRBC COR # BLD: 12.92 10*3/MM3 (ref 4.5–10.7)

## 2017-10-31 PROCEDURE — 93000 ELECTROCARDIOGRAM COMPLETE: CPT | Performed by: INTERNAL MEDICINE

## 2017-10-31 PROCEDURE — 36415 COLL VENOUS BLD VENIPUNCTURE: CPT

## 2017-10-31 PROCEDURE — 80048 BASIC METABOLIC PNL TOTAL CA: CPT

## 2017-10-31 PROCEDURE — 85025 COMPLETE CBC W/AUTO DIFF WBC: CPT

## 2017-10-31 PROCEDURE — 99214 OFFICE O/P EST MOD 30 MIN: CPT | Performed by: INTERNAL MEDICINE

## 2017-10-31 RX ORDER — CLOBETASOL PROPIONATE 0.46 MG/ML
SOLUTION TOPICAL
Status: ON HOLD | COMMUNITY
Start: 2017-10-30 | End: 2017-11-08 | Stop reason: SDUPTHER

## 2017-10-31 RX ORDER — KETOCONAZOLE 20 MG/ML
SHAMPOO TOPICAL AS NEEDED
COMMUNITY
Start: 2017-10-25

## 2017-10-31 RX ORDER — AMMONIUM LACTATE 12 G/100G
LOTION TOPICAL
Status: ON HOLD | COMMUNITY
Start: 2017-10-27 | End: 2017-11-08

## 2017-10-31 RX ORDER — CLOBETASOL PROPIONATE 0.5 MG/G
CREAM TOPICAL
COMMUNITY
Start: 2017-10-27

## 2017-10-31 RX ORDER — LANCETS
EACH MISCELLANEOUS
COMMUNITY
Start: 2017-10-30 | End: 2017-10-31 | Stop reason: SDUPTHER

## 2017-10-31 RX ORDER — LEVOTHYROXINE SODIUM 125 MCG
125 TABLET ORAL 2 TIMES DAILY
Status: ON HOLD | COMMUNITY
Start: 2017-10-01 | End: 2017-11-08

## 2017-10-31 RX ORDER — ATORVASTATIN CALCIUM 40 MG/1
TABLET, FILM COATED ORAL
COMMUNITY
Start: 2017-10-28 | End: 2017-10-31 | Stop reason: SDUPTHER

## 2017-10-31 RX ORDER — CLOBETASOL PROPIONATE 0.5 MG/G
OINTMENT TOPICAL AS NEEDED
COMMUNITY
Start: 2017-10-27 | End: 2018-01-04 | Stop reason: SDUPTHER

## 2017-10-31 RX ORDER — AMMONIUM LACTATE 12 G/100G
CREAM TOPICAL AS NEEDED
COMMUNITY
Start: 2017-10-25

## 2017-10-31 RX ORDER — NYSTATIN 100000 U/G
CREAM TOPICAL
COMMUNITY
Start: 2017-10-31 | End: 2022-10-08 | Stop reason: HOSPADM

## 2017-10-31 NOTE — PROGRESS NOTES
PATIENTINFORMATION    Date of Office Visit: 10/31/2017  Encounter Provider: Catrina Horn MD  Place of Service: [facility] CARDIOLOGY  Patient Name: Jonel Garza  : 1975    Subjective:     Encounter Date:10/31/2017      Patient ID: Jonel Garza is a 42 y.o. male.      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  He has a history of diabetes, hypertension, and hyperlipidemia as well as a family history of coronary artery disease. He was having right-sided chest pain in the fall of . He had an echocardiogram in 2014, which showed normal left ventricular function with an ejection fraction of 62% and no significant valvular heart disease. Because of his chest pain, he had a catheterization in 2014, which showed left main normal, left anterior descending artery 20% proximal lesion, circumflex nondominant with a 20% mid lesion, and right coronary artery dominant with a mid-99% lesion. His inferior wall was felt to be hypokinetic and ejection fraction of 45% to 50%. He had a Xience drug-eluting stent placed to the right coronary artery. He was having more chest pain in 2015 and underwent a stress echocardiogram 5/15, which showed mild left ventricular hypertrophy, normal left ventricular function with an ejection fraction of 60%, normal diastolic filling, and again no valvular heart disease. He exercised for 6 minutes and 59 seconds on the Adithya protocol and had no ST changes, and his stress echocardiogram images were normal. Because his chest pain continued, he went on and had a heart catheterization in 2015, which showed left main normal, left anterior descending artery 20% proximal lesion, and circumflex normal. Right coronary artery had a patent stent in the mid vessel and a 40% mid to distal lesion.  Again, his inferior wall was felt to be hypokinetic with an ejection fraction of 45%.       He was diagnosed with celiac disease over the summer of 2015 and has been trying to be more careful about his diet.    He comes in today with complaints of chest pain.  Its a right sided pain which is similar to what he experienced prior to his stent.  In the last 3-4 months he has had to take the nitroglycerin spray for 5 times.  It goes away after a couple of sprays of nitroglycerin though he does get a headache.  It has occurred at rest as well as with light exertion.  He has not been exercising on a regular basis due to low iron from his celiac disease.  He denies any palpitations or lightheadedness.  He is noting dyspnea on exertion as well as some lower extremity edema which is better when he wears compression socks.    Review of Systems   Constitution: Negative for fever, malaise/fatigue, weight gain and weight loss.   HENT: Negative for ear pain, hearing loss, nosebleeds and sore throat.    Eyes: Negative for double vision, pain, vision loss in left eye and vision loss in right eye.   Cardiovascular:        See history of present illness.   Respiratory: Positive for shortness of breath. Negative for cough, sleep disturbances due to breathing, snoring and wheezing.    Endocrine: Negative for cold intolerance, heat intolerance and polyuria.   Skin: Negative for itching, poor wound healing and rash.   Musculoskeletal: Negative for joint pain, joint swelling and myalgias.   Gastrointestinal: Negative for abdominal pain, diarrhea, hematochezia, nausea and vomiting.   Genitourinary: Negative for hematuria and hesitancy.   Neurological: Negative for numbness, paresthesias and seizures.   Psychiatric/Behavioral: Negative for depression. The patient is not nervous/anxious.            ECG 12 Lead  Date/Time: 10/31/2017 11:04 AM  Performed by: NGOC MAYO  Authorized by: NGOC MAYO   Comparison: compared with previous  "ECG from 8/31/2016  Similar to previous ECG  Rhythm: sinus rhythm  BPM: 70  Conduction: conduction normal  ST Segments: ST segments normal  Clinical impression: normal ECG               Objective:     /82 (BP Location: Left arm)  Pulse 70  Ht 74\" (188 cm)  Wt (!) 348 lb (158 kg)  BMI 44.68 kg/m2 Body mass index is 44.68 kg/(m^2).     Physical Exam   Constitutional: He appears well-developed.   HENT:   Head: Normocephalic and atraumatic.   Eyes: Conjunctivae and lids are normal. Pupils are equal, round, and reactive to light. Lids are everted and swept, no foreign bodies found.   Neck: Normal range of motion. No JVD present. Carotid bruit is not present. No tracheal deviation present. No thyroid mass present.   Cardiovascular: Normal rate, regular rhythm and normal heart sounds.    Pulses:       Dorsalis pedis pulses are 2+ on the right side, and 2+ on the left side.   Pulmonary/Chest: Effort normal and breath sounds normal.   Abdominal: Normal appearance and bowel sounds are normal.   Musculoskeletal: Normal range of motion.   Neurological: He is alert. He has normal strength.   Skin: Skin is warm, dry and intact.   Psychiatric: He has a normal mood and affect. His behavior is normal.   Vitals reviewed.          Assessment/Plan:       1. Coronary artery disease with a stent to the mid right coronary artery in 12/2014. He had a repeat catheterization in 05/2015 for recurrent chest pain, which showed his stent was patent and he only had nonobstructive lesions noted.  He has been noticing increased chest pain.  We discussed medical management versus heart catheterization.  He is most comfortable with a heart catheterization first.  If he has small vessel disease or nonobstructive disease, I would consider increasing his Imdur 220 mg daily or adding Ranexa.  Coronary Artery Disease  Assessment  • The patient has CCS class III - angina with activities of daily living    Plan  • Lifestyle modifications discussed " include adhering to a heart healthy diet, maintenance of a healthy weight and regular exercise    Subjective - Objective  • There has been a previous stent procedure using LIBERTAD on or around 12/15/2014  • Current antiplatelet therapy includes aspirin 81 mg and prasugrel 10 mg    2. Hypertension.  Blood pressure is well controlled.  3. Hyperlipidemia.   4. Diabetes.  He reports his hemoglobin A1c has been climbing.  5. Obstructive sleep apnea. Compliant with BiPAP.   6. Hypothyroid.   7. Obesity.  Unfortunately, his weight is unchanged and he has not exercising.  8. Vascular screening. He underwent vascular screening in 2015 which was normal.    Orders Placed This Encounter   Procedures   • ECG 12 Lead     This order was created via procedure documentation      Jonel Garza   Home Medication Instructions DEZ:    Printed on:10/31/17 8300   Medication Information                      ACCU-CHEK ANN MARIE PLUS test strip  Use to test BG 3 times daily             ACCU-CHEK SOFTCLIX LANCETS lancets  Use to test BG 3 times daily             acidophilus (FLORANEX) tablet tablet  Take 1 tablet by mouth Daily.             ammonium lactate (AMLACTIN) 12 % cream               ammonium lactate (LAC-HYDRIN) 12 % lotion               aspirin 81 MG tablet  Take 81 mg by mouth daily.             atorvastatin (LIPITOR) 40 MG tablet  Take 40 mg by mouth Daily.             benzoyl peroxide-erythromycin (BENZAMYCIN) 5-3 % gel  As Needed.             cetirizine (ZyrTEC) 10 MG tablet  Take 10 mg by mouth Daily.             CLICKFINE PEN NEEDLES 31G X 6 MM misc               clobetasol (TEMOVATE) 0.05 % cream  APPLY TO AFFECTED AREA(S) TWO TIMES A DAY             clobetasol (TEMOVATE) 0.05 % external solution               clobetasol (TEMOVATE) 0.05 % ointment               ezetimibe (ZETIA) 10 MG tablet  Take 10 mg by mouth daily.             fluticasone (FLONASE) 50 MCG/ACT nasal spray  As Needed.             folic acid (FOLVITE) 1 MG  tablet  Take 1 mg by mouth Daily.             HUMALOG KWIKPEN 100 UNIT/ML solution pen-injector  INJECT UP TO 30 UNITS UNDER THE SKIN BEFORE MEALS THREE TIMES A DAY             icosapent ethyl (VASCEPA) 1 G capsule capsule  Take 2 g by mouth 2 (Two) Times a Day With Meals.             Insulin Glargine (LANTUS SOLOSTAR) 100 UNIT/ML injection pen  Inject 65 Units under the skin 2 (Two) Times a Day. PA HAS BEEN APPROVED             Insulin Lispro (HUMALOG) 100 UNIT/ML solution pen-injector  Inject 45 Units under the skin 3 (Three) Times a Day. UP TO30 UNITS AC MEALS TID             irbesartan-hydrochlorothiazide (AVALIDE) 300-12.5 MG tablet  Take 1 tablet by mouth Daily.             isosorbide mononitrate (IMDUR) 60 MG 24 hr tablet  TAKE ONE TABLET BY MOUTH DAILY             JARDIANCE 25 MG tablet  Take 1 tablet by mouth Daily. PA HAS BEEN APPROVED.             ketoconazole (NIZORAL) 2 % shampoo               METAMUCIL MULTIHEALTH FIBER 63 % powder  2 (Two) Times a Day.             metFORMIN (FORTAMET) 1000 MG (OSM) 24 hr tablet  2 PO DAILY WITH FOOD             metoprolol succinate XL (TOPROL-XL) 100 MG 24 hr tablet  Take 100 mg by mouth daily.             nitroglycerin (NITROLINGUAL) 0.4 MG/SPRAY spray  USE ONE SPRAY UNDER THE TONGUE AS NEEDED FOR CHEST PAIN             nystatin (MYCOSTATIN) 539286 UNIT/GM cream  APPLY TO AFFECTED AREA(S) TWO TIMES A DAY             nystatin (MYCOSTATIN) cream  Apply topically 2 (two) times a day.             oxybutynin XL (DITROPAN-XL) 10 MG 24 hr tablet  Take 10 mg by mouth As Needed.             prasugrel (EFFIENT) 10 MG tablet  TAKE ONE TABLET BY MOUTH DAILY             SALINE MIST 0.65 % nasal spray  As Needed.             sertraline (ZOLOFT) 100 MG tablet  Take 100 mg by mouth daily.             SYNTHROID 125 MCG tablet  Take 125 mcg by mouth 2 (Two) Times a Day.             TRIPLE ANTIBIOTIC PLUS 1 % ointment                          Catrina Horn MD  10/31/17  11:05 AM

## 2017-11-02 ENCOUNTER — HOSPITAL ENCOUNTER (OUTPATIENT)
Dept: INFUSION THERAPY | Facility: HOSPITAL | Age: 42
Discharge: HOME | End: 2017-11-02
Attending: INTERNAL MEDICINE | Admitting: INTERNAL MEDICINE

## 2017-11-02 VITALS
OXYGEN SATURATION: 96 % | RESPIRATION RATE: 20 BRPM | DIASTOLIC BLOOD PRESSURE: 57 MMHG | SYSTOLIC BLOOD PRESSURE: 108 MMHG | TEMPERATURE: 97.2 F | HEART RATE: 71 BPM

## 2017-11-02 DIAGNOSIS — N18.9 ANEMIA IN CHRONIC KIDNEY DISEASE, UNSPECIFIED CKD STAGE: ICD-10-CM

## 2017-11-02 DIAGNOSIS — D63.1 ANEMIA IN CHRONIC KIDNEY DISEASE, UNSPECIFIED CKD STAGE: ICD-10-CM

## 2017-11-02 PROCEDURE — 96374 THER/PROPH/DIAG INJ IV PUSH: CPT

## 2017-11-02 PROCEDURE — 25010000002 FERUMOXYTOL 510 MG/17ML SOLUTION 510 MG VIAL: Performed by: INTERNAL MEDICINE

## 2017-11-02 RX ADMIN — FERUMOXYTOL 510 MG: 510 INJECTION INTRAVENOUS at 08:38

## 2017-11-06 RX ORDER — NITROGLYCERIN 0.4 MG/1
0.4 TABLET SUBLINGUAL
COMMUNITY
End: 2018-01-04

## 2017-11-06 RX ORDER — ISOSORBIDE MONONITRATE 60 MG/1
60 TABLET, EXTENDED RELEASE ORAL DAILY
COMMUNITY
End: 2017-12-08

## 2017-11-06 RX ORDER — INSULIN GLARGINE 100 [IU]/ML
65 INJECTION, SOLUTION SUBCUTANEOUS 2 TIMES DAILY
COMMUNITY
End: 2018-01-04

## 2017-11-06 RX ORDER — PRASUGREL 10 MG/1
10 TABLET, FILM COATED ORAL DAILY
COMMUNITY
End: 2018-03-25 | Stop reason: SDUPTHER

## 2017-11-08 ENCOUNTER — HOSPITAL ENCOUNTER (OUTPATIENT)
Facility: HOSPITAL | Age: 42
Setting detail: HOSPITAL OUTPATIENT SURGERY
Discharge: HOME | End: 2017-11-08
Attending: INTERNAL MEDICINE | Admitting: INTERNAL MEDICINE

## 2017-11-08 VITALS
DIASTOLIC BLOOD PRESSURE: 73 MMHG | HEIGHT: 74 IN | RESPIRATION RATE: 16 BRPM | BODY MASS INDEX: 40.43 KG/M2 | TEMPERATURE: 98.8 F | WEIGHT: 315 LBS | SYSTOLIC BLOOD PRESSURE: 136 MMHG | HEART RATE: 73 BPM | OXYGEN SATURATION: 94 %

## 2017-11-08 DIAGNOSIS — IMO0002 UNCONTROLLED TYPE 2 DIABETES MELLITUS WITH COMPLICATION, WITH LONG-TERM CURRENT USE OF INSULIN: Primary | ICD-10-CM

## 2017-11-08 DIAGNOSIS — Z95.5 PRESENCE OF STENT IN CORONARY ARTERY: ICD-10-CM

## 2017-11-08 DIAGNOSIS — R07.2 PRECORDIAL PAIN: ICD-10-CM

## 2017-11-08 DIAGNOSIS — I10 ESSENTIAL HYPERTENSION: ICD-10-CM

## 2017-11-08 DIAGNOSIS — E78.5 HYPERLIPIDEMIA, UNSPECIFIED HYPERLIPIDEMIA TYPE: ICD-10-CM

## 2017-11-08 DIAGNOSIS — I25.10 CORONARY ARTERIOSCLEROSIS IN NATIVE ARTERY: ICD-10-CM

## 2017-11-08 LAB
GLUCOSE BLDC GLUCOMTR-MCNC: 104 MG/DL (ref 70–130)
GLUCOSE BLDC GLUCOMTR-MCNC: 97 MG/DL (ref 70–130)

## 2017-11-08 PROCEDURE — 25010000002 FENTANYL CITRATE (PF) 100 MCG/2ML SOLUTION: Performed by: INTERNAL MEDICINE

## 2017-11-08 PROCEDURE — 82962 GLUCOSE BLOOD TEST: CPT

## 2017-11-08 PROCEDURE — 93458 L HRT ARTERY/VENTRICLE ANGIO: CPT | Performed by: INTERNAL MEDICINE

## 2017-11-08 PROCEDURE — C1769 GUIDE WIRE: HCPCS | Performed by: INTERNAL MEDICINE

## 2017-11-08 PROCEDURE — 25010000002 HEPARIN (PORCINE) PER 1000 UNITS: Performed by: INTERNAL MEDICINE

## 2017-11-08 PROCEDURE — C1894 INTRO/SHEATH, NON-LASER: HCPCS | Performed by: INTERNAL MEDICINE

## 2017-11-08 PROCEDURE — 0 IOPAMIDOL PER 1 ML: Performed by: INTERNAL MEDICINE

## 2017-11-08 PROCEDURE — 25010000002 MIDAZOLAM PER 1 MG: Performed by: INTERNAL MEDICINE

## 2017-11-08 RX ORDER — LIDOCAINE HYDROCHLORIDE 10 MG/ML
0.1 INJECTION, SOLUTION EPIDURAL; INFILTRATION; INTRACAUDAL; PERINEURAL ONCE AS NEEDED
Status: DISCONTINUED | OUTPATIENT
Start: 2017-11-08 | End: 2017-11-08 | Stop reason: HOSPADM

## 2017-11-08 RX ORDER — MIDAZOLAM HYDROCHLORIDE 1 MG/ML
INJECTION INTRAMUSCULAR; INTRAVENOUS AS NEEDED
Status: DISCONTINUED | OUTPATIENT
Start: 2017-11-08 | End: 2017-11-08 | Stop reason: HOSPADM

## 2017-11-08 RX ORDER — SODIUM CHLORIDE 0.9 % (FLUSH) 0.9 %
1-10 SYRINGE (ML) INJECTION AS NEEDED
Status: DISCONTINUED | OUTPATIENT
Start: 2017-11-08 | End: 2017-11-08 | Stop reason: HOSPADM

## 2017-11-08 RX ORDER — LIDOCAINE HYDROCHLORIDE 20 MG/ML
INJECTION, SOLUTION INFILTRATION; PERINEURAL AS NEEDED
Status: DISCONTINUED | OUTPATIENT
Start: 2017-11-08 | End: 2017-11-08 | Stop reason: HOSPADM

## 2017-11-08 RX ORDER — SODIUM CHLORIDE 9 MG/ML
125 INJECTION, SOLUTION INTRAVENOUS CONTINUOUS
Status: DISCONTINUED | OUTPATIENT
Start: 2017-11-08 | End: 2017-11-08 | Stop reason: HOSPADM

## 2017-11-08 RX ORDER — FENTANYL CITRATE 50 UG/ML
INJECTION, SOLUTION INTRAMUSCULAR; INTRAVENOUS AS NEEDED
Status: DISCONTINUED | OUTPATIENT
Start: 2017-11-08 | End: 2017-11-08 | Stop reason: HOSPADM

## 2017-11-08 RX ORDER — SODIUM CHLORIDE 9 MG/ML
100 INJECTION, SOLUTION INTRAVENOUS CONTINUOUS
Status: DISCONTINUED | OUTPATIENT
Start: 2017-11-08 | End: 2017-11-08 | Stop reason: HOSPADM

## 2017-11-08 RX ORDER — LEVOTHYROXINE SODIUM 0.12 MG/1
250 TABLET ORAL DAILY
COMMUNITY
End: 2022-10-08 | Stop reason: HOSPADM

## 2017-11-08 RX ADMIN — SODIUM CHLORIDE 125 ML/HR: 9 INJECTION, SOLUTION INTRAVENOUS at 09:36

## 2017-11-08 NOTE — DISCHARGE INSTRUCTIONS
[facility]  [address] [place] [address]    Coronary Angiogram (Radial/Ulnar Approach) After Care    Refer to this sheet in the next few weeks. These instructions provide you with information on caring for yourself after your procedure. Your caregiver may also give you more specific instructions. Your treatment has been planned according to current medical practices, but problems sometimes occur. Call your caregiver if you have any problems or questions after your procedure.    Home Care Instructions:  · You may shower the day after the procedure. Remove the bandage (dressing) and gently wash the site with plain soap and water. Gently pat the site dry. You may apply a band aid daily for 2 days if desired.    · Do not apply powder or lotion to the site.  · Do not submerge the affected site in water for 3 to 5 days or until the site is completely healed.   · Do not lift, push or pull anything over 10 pounds for 2 days after your procedure.  · Inspect the site at least twice daily. You may notice some bruising at the site and it may be tender for 1 to 2 weeks.     · Increase your fluid intake for the next 2 days.    · Keep arm elevated for 24 hours. For the remainder of the day, keep your arm in “Pledge of Allegiance” position when up and about.     · You may drive 24 hours after the procedure unless otherwise instructed by your caregiver.  · Do not operate machinery or power tools for 24 hours.  · A responsible adult should be with you for the first 24 hours after you arrive home. Do not make any important legal decisions or sign legal papers for 24 hours.      Call Your Doctor if:   · You have unusual pain at the radial/ulnar (wrist) site.  · You have redness, warmth, swelling, or pain at the radial/ulnar (wrist) site.  · You have drainage (other than a small amount of blood on the dressing).  · You have chills or a fever > 101.  · Your arm becomes pale or dark, cool, tingly, or numb.  · You  have heavy bleeding from the site, hold pressure on the site for 20 minutes.  If the bleeding stops, apply a fresh bandage and call your cardiologist.  However, if you continue to have bleeding, call 911.

## 2017-11-08 NOTE — CONSULTS
"Thank you for this referral.  Patient does not have a coverable diagnosis for Phase II Cardiac Rehab at this time, but I did discuss Phase III Cardiac Rehab option with him & his wife.  I provided an information packet which includes a yellow cover sheet, a [facility] Cardiac Rehab Programs handout, and two [facility] [facility] articles that stress the importance of cardiac rehab after a heart event.  Also provided Yazmin booklet \"Understanding Cardiac Rehabilitation.\"   They were both very interested in the information and plan to call us to set up his first visit.     "

## 2017-11-08 NOTE — H&P (VIEW-ONLY)
PATIENTINFORMATION    Date of Office Visit: 10/31/2017  Encounter Provider: Catrina Horn MD  Place of Service: [facility] CARDIOLOGY  Patient Name: Jonel Garza  : 1975    Subjective:     Encounter Date:10/31/2017      Patient ID: Jonel Garza is a 42 y.o. male.      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  He has a history of diabetes, hypertension, and hyperlipidemia as well as a family history of coronary artery disease. He was having right-sided chest pain in the fall of . He had an echocardiogram in 2014, which showed normal left ventricular function with an ejection fraction of 62% and no significant valvular heart disease. Because of his chest pain, he had a catheterization in 2014, which showed left main normal, left anterior descending artery 20% proximal lesion, circumflex nondominant with a 20% mid lesion, and right coronary artery dominant with a mid-99% lesion. His inferior wall was felt to be hypokinetic and ejection fraction of 45% to 50%. He had a Xience drug-eluting stent placed to the right coronary artery. He was having more chest pain in 2015 and underwent a stress echocardiogram 5/15, which showed mild left ventricular hypertrophy, normal left ventricular function with an ejection fraction of 60%, normal diastolic filling, and again no valvular heart disease. He exercised for 6 minutes and 59 seconds on the Adithya protocol and had no ST changes, and his stress echocardiogram images were normal. Because his chest pain continued, he went on and had a heart catheterization in 2015, which showed left main normal, left anterior descending artery 20% proximal lesion, and circumflex normal. Right coronary artery had a patent stent in the mid vessel and a 40% mid to distal lesion.  Again, his inferior wall was felt to be hypokinetic with an ejection fraction of 45%.       He was diagnosed with celiac disease over the summer of 2015 and has been trying to be more careful about his diet.    He comes in today with complaints of chest pain.  Its a right sided pain which is similar to what he experienced prior to his stent.  In the last 3-4 months he has had to take the nitroglycerin spray for 5 times.  It goes away after a couple of sprays of nitroglycerin though he does get a headache.  It has occurred at rest as well as with light exertion.  He has not been exercising on a regular basis due to low iron from his celiac disease.  He denies any palpitations or lightheadedness.  He is noting dyspnea on exertion as well as some lower extremity edema which is better when he wears compression socks.    Review of Systems   Constitution: Negative for fever, malaise/fatigue, weight gain and weight loss.   HENT: Negative for ear pain, hearing loss, nosebleeds and sore throat.    Eyes: Negative for double vision, pain, vision loss in left eye and vision loss in right eye.   Cardiovascular:        See history of present illness.   Respiratory: Positive for shortness of breath. Negative for cough, sleep disturbances due to breathing, snoring and wheezing.    Endocrine: Negative for cold intolerance, heat intolerance and polyuria.   Skin: Negative for itching, poor wound healing and rash.   Musculoskeletal: Negative for joint pain, joint swelling and myalgias.   Gastrointestinal: Negative for abdominal pain, diarrhea, hematochezia, nausea and vomiting.   Genitourinary: Negative for hematuria and hesitancy.   Neurological: Negative for numbness, paresthesias and seizures.   Psychiatric/Behavioral: Negative for depression. The patient is not nervous/anxious.            ECG 12 Lead  Date/Time: 10/31/2017 11:04 AM  Performed by: NGOC MAYO  Authorized by: NGOC MAYO   Comparison: compared with previous  "ECG from 8/31/2016  Similar to previous ECG  Rhythm: sinus rhythm  BPM: 70  Conduction: conduction normal  ST Segments: ST segments normal  Clinical impression: normal ECG               Objective:     /82 (BP Location: Left arm)  Pulse 70  Ht 74\" (188 cm)  Wt (!) 348 lb (158 kg)  BMI 44.68 kg/m2 Body mass index is 44.68 kg/(m^2).     Physical Exam   Constitutional: He appears well-developed.   HENT:   Head: Normocephalic and atraumatic.   Eyes: Conjunctivae and lids are normal. Pupils are equal, round, and reactive to light. Lids are everted and swept, no foreign bodies found.   Neck: Normal range of motion. No JVD present. Carotid bruit is not present. No tracheal deviation present. No thyroid mass present.   Cardiovascular: Normal rate, regular rhythm and normal heart sounds.    Pulses:       Dorsalis pedis pulses are 2+ on the right side, and 2+ on the left side.   Pulmonary/Chest: Effort normal and breath sounds normal.   Abdominal: Normal appearance and bowel sounds are normal.   Musculoskeletal: Normal range of motion.   Neurological: He is alert. He has normal strength.   Skin: Skin is warm, dry and intact.   Psychiatric: He has a normal mood and affect. His behavior is normal.   Vitals reviewed.          Assessment/Plan:       1. Coronary artery disease with a stent to the mid right coronary artery in 12/2014. He had a repeat catheterization in 05/2015 for recurrent chest pain, which showed his stent was patent and he only had nonobstructive lesions noted.  He has been noticing increased chest pain.  We discussed medical management versus heart catheterization.  He is most comfortable with a heart catheterization first.  If he has small vessel disease or nonobstructive disease, I would consider increasing his Imdur 220 mg daily or adding Ranexa.  Coronary Artery Disease  Assessment  • The patient has CCS class III - angina with activities of daily living    Plan  • Lifestyle modifications discussed " include adhering to a heart healthy diet, maintenance of a healthy weight and regular exercise    Subjective - Objective  • There has been a previous stent procedure using LIBERTAD on or around 12/15/2014  • Current antiplatelet therapy includes aspirin 81 mg and prasugrel 10 mg    2. Hypertension.  Blood pressure is well controlled.  3. Hyperlipidemia.   4. Diabetes.  He reports his hemoglobin A1c has been climbing.  5. Obstructive sleep apnea. Compliant with BiPAP.   6. Hypothyroid.   7. Obesity.  Unfortunately, his weight is unchanged and he has not exercising.  8. Vascular screening. He underwent vascular screening in 2015 which was normal.    Orders Placed This Encounter   Procedures   • ECG 12 Lead     This order was created via procedure documentation      Jonel Garza   Home Medication Instructions DEZ:    Printed on:10/31/17 1363   Medication Information                      ACCU-CHEK ANN MARIE PLUS test strip  Use to test BG 3 times daily             ACCU-CHEK SOFTCLIX LANCETS lancets  Use to test BG 3 times daily             acidophilus (FLORANEX) tablet tablet  Take 1 tablet by mouth Daily.             ammonium lactate (AMLACTIN) 12 % cream               ammonium lactate (LAC-HYDRIN) 12 % lotion               aspirin 81 MG tablet  Take 81 mg by mouth daily.             atorvastatin (LIPITOR) 40 MG tablet  Take 40 mg by mouth Daily.             benzoyl peroxide-erythromycin (BENZAMYCIN) 5-3 % gel  As Needed.             cetirizine (ZyrTEC) 10 MG tablet  Take 10 mg by mouth Daily.             CLICKFINE PEN NEEDLES 31G X 6 MM misc               clobetasol (TEMOVATE) 0.05 % cream  APPLY TO AFFECTED AREA(S) TWO TIMES A DAY             clobetasol (TEMOVATE) 0.05 % external solution               clobetasol (TEMOVATE) 0.05 % ointment               ezetimibe (ZETIA) 10 MG tablet  Take 10 mg by mouth daily.             fluticasone (FLONASE) 50 MCG/ACT nasal spray  As Needed.             folic acid (FOLVITE) 1 MG  tablet  Take 1 mg by mouth Daily.             HUMALOG KWIKPEN 100 UNIT/ML solution pen-injector  INJECT UP TO 30 UNITS UNDER THE SKIN BEFORE MEALS THREE TIMES A DAY             icosapent ethyl (VASCEPA) 1 G capsule capsule  Take 2 g by mouth 2 (Two) Times a Day With Meals.             Insulin Glargine (LANTUS SOLOSTAR) 100 UNIT/ML injection pen  Inject 65 Units under the skin 2 (Two) Times a Day. PA HAS BEEN APPROVED             Insulin Lispro (HUMALOG) 100 UNIT/ML solution pen-injector  Inject 45 Units under the skin 3 (Three) Times a Day. UP TO30 UNITS AC MEALS TID             irbesartan-hydrochlorothiazide (AVALIDE) 300-12.5 MG tablet  Take 1 tablet by mouth Daily.             isosorbide mononitrate (IMDUR) 60 MG 24 hr tablet  TAKE ONE TABLET BY MOUTH DAILY             JARDIANCE 25 MG tablet  Take 1 tablet by mouth Daily. PA HAS BEEN APPROVED.             ketoconazole (NIZORAL) 2 % shampoo               METAMUCIL MULTIHEALTH FIBER 63 % powder  2 (Two) Times a Day.             metFORMIN (FORTAMET) 1000 MG (OSM) 24 hr tablet  2 PO DAILY WITH FOOD             metoprolol succinate XL (TOPROL-XL) 100 MG 24 hr tablet  Take 100 mg by mouth daily.             nitroglycerin (NITROLINGUAL) 0.4 MG/SPRAY spray  USE ONE SPRAY UNDER THE TONGUE AS NEEDED FOR CHEST PAIN             nystatin (MYCOSTATIN) [id number] UNIT/GM cream  APPLY TO AFFECTED AREA(S) TWO TIMES A DAY             nystatin (MYCOSTATIN) cream  Apply topically 2 (two) times a day.             oxybutynin XL (DITROPAN-XL) 10 MG 24 hr tablet  Take 10 mg by mouth As Needed.             prasugrel (EFFIENT) 10 MG tablet  TAKE ONE TABLET BY MOUTH DAILY             SALINE MIST 0.65 % nasal spray  As Needed.             sertraline (ZOLOFT) 100 MG tablet  Take 100 mg by mouth daily.             SYNTHROID 125 MCG tablet  Take 125 mcg by mouth 2 (Two) Times a Day.             TRIPLE ANTIBIOTIC PLUS 1 % ointment                          Catrina Horn MD  10/31/17  11:05 AM

## 2017-11-08 NOTE — INTERVAL H&P NOTE
Gentleman with known coronary disease and diabetes on good medical therapy with recurrent symptoms referred for left heart catheter. H&P reviewed. The patient was examined and there are no changes to the H&P. I have explained the risks and benefits of the procedure to the patient.  The patient understands and agrees to proceed

## 2017-11-08 NOTE — PLAN OF CARE
Problem: Patient Care Overview (Adult)  Goal: Plan of Care Review  Outcome: Outcome(s) achieved Date Met:  11/08/17  Goal: Adult Individualization and Mutuality  Outcome: Outcome(s) achieved Date Met:  11/08/17  Goal: Discharge Needs Assessment  Outcome: Outcome(s) achieved Date Met:  11/08/17    Problem: Cardiac Catheterization with/without PCI (Adult)  Goal: Signs and Symptoms of Listed Potential Problems Will be Absent or Manageable (Cardiac Catheterization with/without PCI)  Outcome: Outcome(s) achieved Date Met:  11/08/17

## 2017-11-25 RX ORDER — PRASUGREL 10 MG/1
TABLET, FILM COATED ORAL
Qty: 30 TABLET | Refills: 3 | Status: SHIPPED | OUTPATIENT
Start: 2017-11-25 | End: 2017-12-08 | Stop reason: SDUPTHER

## 2017-12-04 RX ORDER — NITROGLYCERIN 400 UG/1
SPRAY ORAL
Qty: 4.9 G | Refills: 0 | Status: SHIPPED | OUTPATIENT
Start: 2017-12-04 | End: 2017-12-08

## 2017-12-08 ENCOUNTER — OFFICE VISIT (OUTPATIENT)
Dept: CARDIOLOGY | Facility: CLINIC | Age: 42
End: 2017-12-08

## 2017-12-08 VITALS
HEIGHT: 74 IN | SYSTOLIC BLOOD PRESSURE: 118 MMHG | WEIGHT: 315 LBS | DIASTOLIC BLOOD PRESSURE: 72 MMHG | HEART RATE: 70 BPM | BODY MASS INDEX: 40.43 KG/M2 | RESPIRATION RATE: 16 BRPM

## 2017-12-08 DIAGNOSIS — K90.0 CELIAC DISEASE: ICD-10-CM

## 2017-12-08 DIAGNOSIS — E11.59 TYPE 2 DIABETES MELLITUS WITH OTHER CIRCULATORY COMPLICATION, WITH LONG-TERM CURRENT USE OF INSULIN (HCC): ICD-10-CM

## 2017-12-08 DIAGNOSIS — E66.01 MORBID OBESITY DUE TO EXCESS CALORIES (HCC): ICD-10-CM

## 2017-12-08 DIAGNOSIS — I25.10 CORONARY ARTERIOSCLEROSIS IN NATIVE ARTERY: Primary | ICD-10-CM

## 2017-12-08 DIAGNOSIS — E78.5 HYPERLIPIDEMIA, UNSPECIFIED HYPERLIPIDEMIA TYPE: ICD-10-CM

## 2017-12-08 DIAGNOSIS — Z79.4 TYPE 2 DIABETES MELLITUS WITH OTHER CIRCULATORY COMPLICATION, WITH LONG-TERM CURRENT USE OF INSULIN (HCC): ICD-10-CM

## 2017-12-08 DIAGNOSIS — I10 ESSENTIAL HYPERTENSION: ICD-10-CM

## 2017-12-08 PROCEDURE — 99214 OFFICE O/P EST MOD 30 MIN: CPT | Performed by: INTERNAL MEDICINE

## 2017-12-08 PROCEDURE — 93000 ELECTROCARDIOGRAM COMPLETE: CPT | Performed by: INTERNAL MEDICINE

## 2017-12-08 RX ORDER — ISOSORBIDE MONONITRATE 30 MG/1
30 TABLET, EXTENDED RELEASE ORAL 2 TIMES DAILY
Qty: 60 TABLET | Refills: 11 | Status: SHIPPED | OUTPATIENT
Start: 2017-12-08 | End: 2018-12-10 | Stop reason: SDUPTHER

## 2017-12-20 DIAGNOSIS — E29.1 HYPOGONADISM IN MALE: ICD-10-CM

## 2017-12-20 DIAGNOSIS — E03.9 HYPOTHYROIDISM, UNSPECIFIED TYPE: Primary | ICD-10-CM

## 2017-12-20 DIAGNOSIS — E55.9 VITAMIN D DEFICIENCY: ICD-10-CM

## 2017-12-20 DIAGNOSIS — IMO0001 UNCONTROLLED TYPE 2 DIABETES MELLITUS WITHOUT COMPLICATION, WITH LONG-TERM CURRENT USE OF INSULIN: ICD-10-CM

## 2017-12-20 RX ORDER — NITROGLYCERIN 400 UG/1
SPRAY ORAL
Qty: 4.9 G | Refills: 0 | Status: SHIPPED | OUTPATIENT
Start: 2017-12-20 | End: 2018-01-06 | Stop reason: SDUPTHER

## 2017-12-21 ENCOUNTER — LAB (OUTPATIENT)
Dept: ENDOCRINOLOGY | Age: 42
End: 2017-12-21

## 2017-12-21 DIAGNOSIS — E55.9 VITAMIN D DEFICIENCY: ICD-10-CM

## 2017-12-21 DIAGNOSIS — E03.9 HYPOTHYROIDISM, UNSPECIFIED TYPE: ICD-10-CM

## 2017-12-21 DIAGNOSIS — IMO0001 UNCONTROLLED TYPE 2 DIABETES MELLITUS WITHOUT COMPLICATION, WITH LONG-TERM CURRENT USE OF INSULIN: ICD-10-CM

## 2017-12-21 DIAGNOSIS — E29.1 HYPOGONADISM IN MALE: ICD-10-CM

## 2017-12-24 LAB
25(OH)D3+25(OH)D2 SERPL-MCNC: 36.5 NG/ML (ref 30–100)
ALBUMIN SERPL-MCNC: 4.2 G/DL (ref 3.5–5.2)
ALBUMIN/GLOB SERPL: 1.3 G/DL
ALP SERPL-CCNC: 130 U/L (ref 39–117)
ALT SERPL-CCNC: 49 U/L (ref 1–41)
AST SERPL-CCNC: 26 U/L (ref 1–40)
BILIRUB SERPL-MCNC: 0.3 MG/DL (ref 0.1–1.2)
BUN SERPL-MCNC: 12 MG/DL (ref 6–20)
BUN/CREAT SERPL: 16 (ref 7–25)
C PEPTIDE SERPL-MCNC: 7.9 NG/ML (ref 1.1–4.4)
CALCIUM SERPL-MCNC: 9.5 MG/DL (ref 8.6–10.5)
CHLORIDE SERPL-SCNC: 96 MMOL/L (ref 98–107)
CHOLEST SERPL-MCNC: 141 MG/DL (ref 0–200)
CO2 SERPL-SCNC: 25.5 MMOL/L (ref 22–29)
CREAT SERPL-MCNC: 0.75 MG/DL (ref 0.76–1.27)
FT4I SERPL CALC-MCNC: 2.5 (ref 1.2–4.9)
GLOBULIN SER CALC-MCNC: 3.2 GM/DL
GLUCOSE SERPL-MCNC: 348 MG/DL (ref 65–99)
HBA1C MFR BLD: 9.93 % (ref 4.8–5.6)
HCT VFR BLD AUTO: 39.8 % (ref 40.4–52.2)
HDLC SERPL-MCNC: 39 MG/DL (ref 40–60)
HGB BLD-MCNC: 13.7 G/DL (ref 13.7–17.6)
INTERPRETATION: NORMAL
LDLC SERPL CALC-MCNC: 71 MG/DL (ref 0–100)
Lab: NORMAL
MICROALBUMIN UR-MCNC: <3 UG/ML
POTASSIUM SERPL-SCNC: 4.5 MMOL/L (ref 3.5–5.2)
PROT SERPL-MCNC: 7.4 G/DL (ref 6–8.5)
PTH-INTACT SERPL-MCNC: 28 PG/ML (ref 15–65)
SHBG SERPL-SCNC: 24.5 NMOL/L (ref 16.5–55.9)
SODIUM SERPL-SCNC: 136 MMOL/L (ref 136–145)
T3FREE SERPL-MCNC: 2.7 PG/ML (ref 2–4.4)
T3RU NFR SERPL: 28 % (ref 24–39)
T4 FREE SERPL-MCNC: 1.53 NG/DL (ref 0.93–1.7)
T4 SERPL-MCNC: 9 UG/DL (ref 4.5–12)
TESTOST FREE SERPL-MCNC: 6.8 PG/ML (ref 6.8–21.5)
TESTOST SERPL-MCNC: 136 NG/DL (ref 264–916)
THYROGLOB AB SERPL-ACNC: <1 IU/ML
THYROGLOB SERPL-MCNC: <0.1 NG/ML
THYROGLOB SERPL-MCNC: NORMAL NG/ML
TRIGL SERPL-MCNC: 153 MG/DL (ref 0–150)
TSH SERPL DL<=0.005 MIU/L-ACNC: 2.02 UIU/ML (ref 0.45–4.5)
VLDLC SERPL CALC-MCNC: 30.6 MG/DL (ref 5–40)

## 2017-12-26 RX ORDER — ISOSORBIDE MONONITRATE 60 MG/1
TABLET, EXTENDED RELEASE ORAL
Qty: 30 TABLET | Refills: 0 | Status: SHIPPED | OUTPATIENT
Start: 2017-12-26 | End: 2018-01-04 | Stop reason: DRUGHIGH

## 2018-01-04 ENCOUNTER — OFFICE VISIT (OUTPATIENT)
Dept: ENDOCRINOLOGY | Age: 43
End: 2018-01-04

## 2018-01-04 VITALS
BODY MASS INDEX: 40.43 KG/M2 | DIASTOLIC BLOOD PRESSURE: 68 MMHG | SYSTOLIC BLOOD PRESSURE: 114 MMHG | WEIGHT: 315 LBS | HEIGHT: 74 IN

## 2018-01-04 DIAGNOSIS — E03.9 HYPOTHYROIDISM, UNSPECIFIED TYPE: ICD-10-CM

## 2018-01-04 DIAGNOSIS — E78.5 HYPERLIPIDEMIA, UNSPECIFIED HYPERLIPIDEMIA TYPE: ICD-10-CM

## 2018-01-04 DIAGNOSIS — E29.1 HYPOGONADISM IN MALE: ICD-10-CM

## 2018-01-04 DIAGNOSIS — IMO0001 UNCONTROLLED TYPE 2 DIABETES MELLITUS WITHOUT COMPLICATION, WITH LONG-TERM CURRENT USE OF INSULIN: Primary | ICD-10-CM

## 2018-01-04 DIAGNOSIS — I10 ESSENTIAL HYPERTENSION: ICD-10-CM

## 2018-01-04 PROCEDURE — 99214 OFFICE O/P EST MOD 30 MIN: CPT | Performed by: NURSE PRACTITIONER

## 2018-01-04 RX ORDER — INSULIN GLARGINE 100 [IU]/ML
70 INJECTION, SOLUTION SUBCUTANEOUS 2 TIMES DAILY
Qty: 45 ML | Refills: 5 | Status: SHIPPED | OUTPATIENT
Start: 2018-01-04 | End: 2020-11-10

## 2018-01-04 RX ORDER — OSELTAMIVIR PHOSPHATE 75 MG/1
75 CAPSULE ORAL 2 TIMES DAILY
Qty: 10 CAPSULE | Refills: 0 | Status: SHIPPED | OUTPATIENT
Start: 2018-01-04 | End: 2018-01-14

## 2018-01-04 NOTE — PROGRESS NOTES
"Subjective   Jonel Garza is a 42 y.o. male is here today for follow-up.  Chief Complaint   Patient presents with   • Diabetes     recent labs, pt tests BG 4-5x daily, pt brought meter   • Hypothyroidism   • Hypertension   • Hyperlipidemia   • Vitamin D Deficiency   • Obesity     /68  Ht 188 cm (74\")  Wt (!) 158 kg (349 lb)  BMI 44.81 kg/m2  Family History   Problem Relation Age of Onset   • Hypertension Mother    • Heart disease Mother    • Stroke Mother      CVA   • Pancreatic cancer Mother    • Heart disease Father    • Hypertension Sister    • Hypertension Brother    • Diabetes type II Brother      Social History   Substance Use Topics   • Smoking status: Never Smoker   • Smokeless tobacco: Never Used   • Alcohol use No      Comment: occasional caffeine use     Allergies   Allergen Reactions   • Ace Inhibitors Anaphylaxis   • Iodinated Diagnostic Agents Nausea And Vomiting   • Quinapril Anaphylaxis and Swelling     angioedema   • Accupril [Quinapril Hcl]    • Crestor [Rosuvastatin]    • Dobutamine Nausea And Vomiting   • Rosuvastatin Calcium      Other reaction(s): Other (See Comments)  rhabdomyolisis   • Rosuvastatin Calcium Other (See Comments)     rhabdomyolisis   • Latex Rash     Family History   Problem Relation Age of Onset   • Hypertension Mother    • Heart disease Mother    • Stroke Mother      CVA   • Pancreatic cancer Mother    • Heart disease Father    • Hypertension Sister    • Hypertension Brother    • Diabetes type II Brother          History of Present Illness  Encounter Diagnoses   Name Primary?   • Uncontrolled type 2 diabetes mellitus without complication, with long-term current use of insulin Yes   • Hypothyroidism, unspecified type    • Hypogonadism in male    • Hyperlipidemia, unspecified hyperlipidemia type    • Essential hypertension      42-year-old male patient here today for routine follow-up visit.  He has been seen for uncontrolled type 2 diabetes.  He is wearing a face mask " at today's visit.  He states he has been exposed to influenza type BS is wanting a prescription for Tamiflu.  He is checking his blood sugars typically 1-2 times daily.  His blood sugar readings are in the 2-400 range.  He has met with the diabetes clinical educator for preparation of insulin pump therapy.  He is also spoke with [employer] and is in the process of getting a pump approved.  We did discuss his blood sugar readings and that he needs to use this information to make changes accordingly.  He has not been adjusting his insulin to get his blood sugars down.  He states he is also trying to count carbohydrates and felt that the education he was recently seen for has helped with that process.  His medication list was reviewed at today's visit.  He is an uncircumcised male and is concerned about potential final infections associated with Jardiance.  We discussed the need for meticulous hygiene and preventing fungal infections.  He had recent labs which were reviewed and he was provided a  The following portions of the patient's history were reviewed and updated as appropriate: allergies, current medications, past family history, past medical history, past social history, past surgical history and problem list.    Review of Systems   Constitutional: Negative for fatigue.   HENT: Negative for trouble swallowing.    Eyes: Negative for visual disturbance.   Respiratory: Negative for shortness of breath.    Cardiovascular: Negative for leg swelling.   Endocrine: Negative for polyuria.   Skin: Negative for wound.   Neurological: Negative for numbness.       Objective   Physical Exam   Constitutional: He is oriented to person, place, and time. He appears well-developed and well-nourished. No distress.   HENT:   Head: Normocephalic and atraumatic.   Right Ear: External ear normal.   Left Ear: External ear normal.   Nose: Nose normal.   Eyes: Pupils are equal, round, and reactive to light. Right eye exhibits no  discharge. Left eye exhibits no discharge.   Neck: Normal range of motion. Neck supple. Carotid bruit is not present. No tracheal deviation, no edema and no erythema present. No thyromegaly present.   Cardiovascular: Normal rate, regular rhythm, normal heart sounds and intact distal pulses.  Exam reveals no gallop and no friction rub.    No murmur heard.  Pulmonary/Chest: Effort normal and breath sounds normal. No respiratory distress. He has no wheezes. He has no rales.   Abdominal: Soft. Bowel sounds are normal. He exhibits no distension. There is no tenderness.   Musculoskeletal: Normal range of motion. He exhibits edema. He exhibits no deformity.   1 + pedal edema.  States he will sometimes wear compression stockings   Lymphadenopathy:     He has no cervical adenopathy.   Neurological: He is alert and oriented to person, place, and time. Coordination normal.   Skin: Skin is warm and dry. No rash noted. He is not diaphoretic. No erythema. No pallor.   ACANTHOSIS NIGRICANS   Psychiatric: He has a normal mood and affect. His behavior is normal. Judgment and thought content normal.   Nursing note and vitals reviewed.    Results for orders placed or performed in visit on 12/21/17   Comprehensive Metabolic Panel   Result Value Ref Range    Glucose 348 (H) 65 - 99 mg/dL    BUN 12 6 - 20 mg/dL    Creatinine 0.75 (L) 0.76 - 1.27 mg/dL    eGFR Non African Am 114 >60 mL/min/1.73    eGFR African Am 138 >60 mL/min/1.73    BUN/Creatinine Ratio 16.0 7.0 - 25.0    Sodium 136 136 - 145 mmol/L    Potassium 4.5 3.5 - 5.2 mmol/L    Chloride 96 (L) 98 - 107 mmol/L    Total CO2 25.5 22.0 - 29.0 mmol/L    Calcium 9.5 8.6 - 10.5 mg/dL    Total Protein 7.4 6.0 - 8.5 g/dL    Albumin 4.20 3.50 - 5.20 g/dL    Globulin 3.2 gm/dL    A/G Ratio 1.3 g/dL    Total Bilirubin 0.3 0.1 - 1.2 mg/dL    Alkaline Phosphatase 130 (H) 39 - 117 U/L    AST (SGOT) 26 1 - 40 U/L    ALT (SGPT) 49 (H) 1 - 41 U/L   Comprehensive Thyroglobulin   Result Value Ref  Range    Thyroglobulin Ab <1.0 IU/mL    Thyroglobulin <0.1 ng/mL    Thyroglobulin (TG-SARA) Comment ng/mL   C-Peptide   Result Value Ref Range    C-Peptide 7.9 (H) 1.1 - 4.4 ng/mL   Hemoglobin & Hematocrit, Blood   Result Value Ref Range    Hemoglobin 13.7 13.7 - 17.6 g/dL    Hematocrit 39.8 (L) 40.4 - 52.2 %   Hemoglobin A1c   Result Value Ref Range    Hemoglobin A1C 9.93 (H) 4.80 - 5.60 %   Lipid Panel   Result Value Ref Range    Total Cholesterol 141 0 - 200 mg/dL    Triglycerides 153 (H) 0 - 150 mg/dL    HDL Cholesterol 39 (L) 40 - 60 mg/dL    VLDL Cholesterol 30.6 5 - 40 mg/dL    LDL Cholesterol  71 0 - 100 mg/dL   PTH, Intact   Result Value Ref Range    PTH, Intact 28 15 - 65 pg/mL   T3, Free   Result Value Ref Range    T3, Free 2.7 2.0 - 4.4 pg/mL   T4, Free   Result Value Ref Range    Free T4 1.53 0.93 - 1.70 ng/dL   TestT+TestF+SHBG   Result Value Ref Range    Testosterone, Total 136 (L) 264 - 916 ng/dL    Testosterone, Free 6.8 6.8 - 21.5 pg/mL    Sex Hormone Binding Globulin 24.5 16.5 - 55.9 nmol/L   Thyroid Panel With TSH   Result Value Ref Range    TSH 2.020 0.450 - 4.500 uIU/mL    T4, Total 9.0 4.5 - 12.0 ug/dL    T3 Uptake 28 24 - 39 %    Free Thyroxine Index 2.5 1.2 - 4.9   Vitamin D 25 Hydroxy   Result Value Ref Range    25 Hydroxy, Vitamin D 36.5 30.0 - 100.0 ng/mL   MicroAlbumin, Urine, Random   Result Value Ref Range    Microalbumin, Urine <3.0 Not Estab. ug/mL   Cardiovascular Risk Assessment   Result Value Ref Range    Interpretation Note    Diabetes Patient Education   Result Value Ref Range    PDF Image Not applicable          Assessment/Plan   Problems Addressed this Visit        Cardiovascular and Mediastinum    Hyperlipidemia    Essential hypertension       Endocrine    Hypothyroidism    Uncontrolled diabetes mellitus type 2 without complications - Primary    Relevant Medications    LANTUS SOLOSTAR 100 UNIT/ML injection pen    Insulin Lispro (HUMALOG KWIKPEN) 100 UNIT/ML solution  pen-injector    Hypogonadism in male        In summary, patient was seen and examined.  His hemoglobin A1c has gone up consistently from previous visits.  I increased his Lantus insulin to 70 units twice daily.  I've also increased his mealtime insulin to 40 units prior to each meal.  Typically right eye his blood sugars are not well managed and are in the 2-400 range.  He has been advised to take this information to use accordingly to make changes to get his blood sugars under better control.  He is in the process of trying to get a [employer] insulin pump approved.  He has met with the clinical educator for preliminary education carb counting.  A prescription was sent for Tamiflu to his pharmacy per his request since been exposed to influenza type B.  No other medications were changed at today's visit.  Metabolically he is stable however his diabetes is not well controlled.

## 2018-01-08 RX ORDER — NITROGLYCERIN 400 UG/1
SPRAY ORAL
Qty: 4.9 G | Refills: 0 | Status: SHIPPED | OUTPATIENT
Start: 2018-01-08 | End: 2018-02-04 | Stop reason: SDUPTHER

## 2018-01-26 RX ORDER — ICOSAPENT ETHYL 1000 MG/1
CAPSULE ORAL
Qty: 120 CAPSULE | Refills: 4 | Status: SHIPPED | OUTPATIENT
Start: 2018-01-26 | End: 2022-10-08 | Stop reason: HOSPADM

## 2018-02-05 RX ORDER — NITROGLYCERIN 400 UG/1
SPRAY ORAL
Qty: 4.9 G | Refills: 0 | Status: SHIPPED | OUTPATIENT
Start: 2018-02-05 | End: 2018-02-21 | Stop reason: SDUPTHER

## 2018-02-21 ENCOUNTER — PRIOR AUTHORIZATION (OUTPATIENT)
Dept: ENDOCRINOLOGY | Age: 43
End: 2018-02-21

## 2018-02-21 RX ORDER — NITROGLYCERIN 400 UG/1
SPRAY ORAL
Qty: 4.9 G | Refills: 0 | Status: SHIPPED | OUTPATIENT
Start: 2018-02-21 | End: 2018-03-09 | Stop reason: SDUPTHER

## 2018-02-21 NOTE — TELEPHONE ENCOUNTER
PT'S PA FOR VASCEPA WAS SUBMITTED TO [employer] ON 2/21/2018 AND ON 2/27/18 PATIENT WAS ASKED TO BE SWITCHED TO OMEGA 3.

## 2018-02-22 RX ORDER — INSULIN LISPRO 100 [IU]/ML
INJECTION, SOLUTION INTRAVENOUS; SUBCUTANEOUS
Qty: 30 ML | Refills: 2 | Status: SHIPPED | OUTPATIENT
Start: 2018-02-22 | End: 2018-03-12 | Stop reason: SDUPTHER

## 2018-02-27 RX ORDER — OMEGA-3 FATTY ACIDS/FISH OIL 300-1000MG
CAPSULE ORAL
Qty: 120 EACH | Refills: 5 | Status: SHIPPED | OUTPATIENT
Start: 2018-02-27 | End: 2022-10-08 | Stop reason: HOSPADM

## 2018-03-09 RX ORDER — NITROGLYCERIN 400 UG/1
SPRAY ORAL
Qty: 4.9 G | Refills: 0 | Status: SHIPPED | OUTPATIENT
Start: 2018-03-09 | End: 2018-09-03 | Stop reason: SDUPTHER

## 2018-03-12 ENCOUNTER — PRIOR AUTHORIZATION (OUTPATIENT)
Dept: ENDOCRINOLOGY | Age: 43
End: 2018-03-12

## 2018-03-12 RX ORDER — INSULIN LISPRO 100 [IU]/ML
30 INJECTION, SOLUTION INTRAVENOUS; SUBCUTANEOUS 3 TIMES DAILY
Qty: 30 ML | Refills: 2 | Status: SHIPPED | OUTPATIENT
Start: 2018-03-12 | End: 2018-03-15 | Stop reason: SDUPTHER

## 2018-03-12 NOTE — TELEPHONE ENCOUNTER
PT'S PA FOR HUMALOG WAS SUBMITTED TO [employer] ON 3/12/18 AND WAS APPROVED ON 3/12/18. PHARMACY WAS NOTIFIED.

## 2018-03-15 ENCOUNTER — TELEPHONE (OUTPATIENT)
Dept: ENDOCRINOLOGY | Age: 43
End: 2018-03-15

## 2018-03-15 NOTE — TELEPHONE ENCOUNTER
----- Message from Ryanne Bush sent at 3/15/2018 11:48 AM EDT -----  PATIENT HAS CALLED IN STATING THE HE NEED THE INSULINE PUMP VILES HAVE NOT BE SENT IN FOR HIS SPECIFIC PUMP.      ALSO  ASKING IF WE HAVE FAXED IN ORDER FOR HIS PUMP.  HE IS REQUESTING A CALL BACK     [phone]     Called patient and left message to call the office, insulin vials have been resent and pump start orders faxed again

## 2018-03-26 RX ORDER — PRASUGREL 10 MG/1
TABLET, FILM COATED ORAL
Qty: 30 TABLET | Refills: 5 | Status: SHIPPED | OUTPATIENT
Start: 2018-03-26 | End: 2018-09-24 | Stop reason: SDUPTHER

## 2018-03-29 RX ORDER — INSULIN GLARGINE 100 [IU]/ML
INJECTION, SOLUTION SUBCUTANEOUS
Qty: 15 ML | Refills: 0 | Status: SHIPPED | OUTPATIENT
Start: 2018-03-29 | End: 2018-05-11 | Stop reason: SDUPTHER

## 2018-04-16 NOTE — PATIENT INSTRUCTIONS
vascepa 1 gram 2 pills twice daily  Diabetes education classes  Synthroid 300 mcg daily  jardiance 25 mg once daily  
Adequate: hears normal conversation without difficulty

## 2018-04-26 RX ORDER — BLOOD-GLUCOSE METER
1 EACH MISCELLANEOUS ONCE
Qty: 1 KIT | Refills: 0 | Status: SHIPPED | OUTPATIENT
Start: 2018-04-26 | End: 2018-04-26

## 2018-04-26 RX ORDER — BLOOD SUGAR DIAGNOSTIC
STRIP MISCELLANEOUS
Qty: 500 EACH | Refills: 0 | Status: SHIPPED | OUTPATIENT
Start: 2018-04-26

## 2018-04-26 RX ORDER — LANCETS
EACH MISCELLANEOUS
Qty: 500 EACH | Refills: 0 | Status: SHIPPED | OUTPATIENT
Start: 2018-04-26

## 2018-05-08 ENCOUNTER — TRANSCRIBE ORDERS (OUTPATIENT)
Dept: ADMINISTRATIVE | Facility: HOSPITAL | Age: 43
End: 2018-05-08

## 2018-05-08 DIAGNOSIS — E61.1 IRON DEFICIENCY: ICD-10-CM

## 2018-05-08 DIAGNOSIS — D63.1 ANEMIA IN CHRONIC KIDNEY DISEASE (CODE): Primary | ICD-10-CM

## 2018-05-11 ENCOUNTER — HOSPITAL ENCOUNTER (OUTPATIENT)
Dept: INFUSION THERAPY | Facility: HOSPITAL | Age: 43
Discharge: HOME | End: 2018-05-11
Attending: INTERNAL MEDICINE | Admitting: INTERNAL MEDICINE

## 2018-05-11 VITALS
DIASTOLIC BLOOD PRESSURE: 77 MMHG | SYSTOLIC BLOOD PRESSURE: 123 MMHG | RESPIRATION RATE: 20 BRPM | OXYGEN SATURATION: 94 % | HEART RATE: 70 BPM | TEMPERATURE: 98.1 F

## 2018-05-11 DIAGNOSIS — N18.9 ANEMIA IN CHRONIC KIDNEY DISEASE, UNSPECIFIED CKD STAGE: ICD-10-CM

## 2018-05-11 DIAGNOSIS — D63.1 ANEMIA IN CHRONIC KIDNEY DISEASE, UNSPECIFIED CKD STAGE: ICD-10-CM

## 2018-05-11 PROCEDURE — 96374 THER/PROPH/DIAG INJ IV PUSH: CPT

## 2018-05-11 PROCEDURE — 96365 THER/PROPH/DIAG IV INF INIT: CPT

## 2018-05-11 PROCEDURE — 25010000002 FERUMOXYTOL 510 MG/17ML SOLUTION 510 MG VIAL: Performed by: INTERNAL MEDICINE

## 2018-05-11 RX ORDER — SULFAMETHOXAZOLE AND TRIMETHOPRIM 800; 160 MG/1; MG/1
1 TABLET ORAL 2 TIMES DAILY
COMMUNITY
End: 2018-07-05

## 2018-05-11 RX ORDER — INSULIN GLARGINE 100 [IU]/ML
INJECTION, SOLUTION SUBCUTANEOUS
Qty: 15 ML | Refills: 0 | Status: SHIPPED | OUTPATIENT
Start: 2018-05-11 | End: 2018-05-14 | Stop reason: SDUPTHER

## 2018-05-11 RX ADMIN — FERUMOXYTOL 510 MG: 510 INJECTION INTRAVENOUS at 08:26

## 2018-05-11 NOTE — PROGRESS NOTES
Tolerated well, monitored 30 minutes post infusion without incident. VSS. D/C'd per ambulation @3768. To return 1 week.

## 2018-05-14 RX ORDER — INSULIN GLARGINE 100 [IU]/ML
INJECTION, SOLUTION SUBCUTANEOUS
Qty: 15 ML | Refills: 0 | Status: SHIPPED | OUTPATIENT
Start: 2018-05-14 | End: 2020-11-10

## 2018-05-17 RX ORDER — BLOOD SUGAR DIAGNOSTIC
STRIP MISCELLANEOUS
Refills: 0 | OUTPATIENT
Start: 2018-05-17

## 2018-05-18 ENCOUNTER — HOSPITAL ENCOUNTER (OUTPATIENT)
Dept: INFUSION THERAPY | Facility: HOSPITAL | Age: 43
Discharge: HOME | End: 2018-05-18
Attending: INTERNAL MEDICINE | Admitting: INTERNAL MEDICINE

## 2018-05-18 VITALS
OXYGEN SATURATION: 94 % | SYSTOLIC BLOOD PRESSURE: 133 MMHG | HEART RATE: 66 BPM | RESPIRATION RATE: 20 BRPM | DIASTOLIC BLOOD PRESSURE: 66 MMHG | TEMPERATURE: 98.2 F

## 2018-05-18 DIAGNOSIS — D63.1 ANEMIA IN CHRONIC KIDNEY DISEASE, UNSPECIFIED CKD STAGE: ICD-10-CM

## 2018-05-18 DIAGNOSIS — N18.9 ANEMIA IN CHRONIC KIDNEY DISEASE, UNSPECIFIED CKD STAGE: ICD-10-CM

## 2018-05-18 PROCEDURE — 25010000002 FERUMOXYTOL 510 MG/17ML SOLUTION 510 MG VIAL: Performed by: INTERNAL MEDICINE

## 2018-05-18 PROCEDURE — 96374 THER/PROPH/DIAG INJ IV PUSH: CPT

## 2018-05-18 RX ADMIN — FERUMOXYTOL 510 MG: 510 INJECTION INTRAVENOUS at 08:45

## 2018-05-18 NOTE — PROGRESS NOTES
Tolerated well, monitored for 30 minutes post infusion with no S/S of reaction or complaints.  VSS.  Pt DC per ambulation @ 0940.

## 2018-05-25 RX ORDER — INSULIN GLARGINE 100 [IU]/ML
INJECTION, SOLUTION SUBCUTANEOUS
Refills: 0 | OUTPATIENT
Start: 2018-05-25

## 2018-06-11 ENCOUNTER — PRIOR AUTHORIZATION (OUTPATIENT)
Dept: ENDOCRINOLOGY | Age: 43
End: 2018-06-11

## 2018-07-05 ENCOUNTER — OFFICE VISIT (OUTPATIENT)
Dept: CARDIOLOGY | Facility: CLINIC | Age: 43
End: 2018-07-05

## 2018-07-05 VITALS
WEIGHT: 315 LBS | SYSTOLIC BLOOD PRESSURE: 112 MMHG | BODY MASS INDEX: 40.43 KG/M2 | HEIGHT: 74 IN | DIASTOLIC BLOOD PRESSURE: 78 MMHG | RESPIRATION RATE: 16 BRPM | HEART RATE: 69 BPM

## 2018-07-05 DIAGNOSIS — E66.01 MORBID OBESITY DUE TO EXCESS CALORIES (HCC): ICD-10-CM

## 2018-07-05 DIAGNOSIS — I10 ESSENTIAL HYPERTENSION: ICD-10-CM

## 2018-07-05 DIAGNOSIS — R07.2 PRECORDIAL PAIN: ICD-10-CM

## 2018-07-05 DIAGNOSIS — I25.10 CORONARY ARTERIOSCLEROSIS IN NATIVE ARTERY: Primary | ICD-10-CM

## 2018-07-05 DIAGNOSIS — E78.5 HYPERLIPIDEMIA, UNSPECIFIED HYPERLIPIDEMIA TYPE: ICD-10-CM

## 2018-07-05 DIAGNOSIS — IMO0001 UNCONTROLLED TYPE 2 DIABETES MELLITUS WITHOUT COMPLICATION, WITH LONG-TERM CURRENT USE OF INSULIN: ICD-10-CM

## 2018-07-05 DIAGNOSIS — Z95.5 PRESENCE OF STENT IN CORONARY ARTERY: ICD-10-CM

## 2018-07-05 DIAGNOSIS — E66.01 MORBID (SEVERE) OBESITY DUE TO EXCESS CALORIES (HCC): ICD-10-CM

## 2018-07-05 PROCEDURE — 99214 OFFICE O/P EST MOD 30 MIN: CPT | Performed by: INTERNAL MEDICINE

## 2018-07-05 PROCEDURE — 93000 ELECTROCARDIOGRAM COMPLETE: CPT | Performed by: INTERNAL MEDICINE

## 2018-07-05 RX ORDER — EPINEPHRINE 0.3 MG/.3ML
0.3 INJECTION SUBCUTANEOUS
COMMUNITY
Start: 2018-05-29 | End: 2019-05-29

## 2018-07-05 RX ORDER — DOXYCYCLINE HYCLATE 50 MG/1
324 CAPSULE, GELATIN COATED ORAL
COMMUNITY
Start: 2015-05-14 | End: 2020-06-22

## 2018-07-05 NOTE — PROGRESS NOTES
PATIENTINFORMATION    Date of Office Visit: 2018  Encounter Provider: Catrina Horn MD  Place of Service: [facility] CARDIOLOGY  Patient Name: Jonel Garza  : 1975    Subjective:     Encounter Date:2018      Patient ID: Jonel Garza is a 43 y.o. male.      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  He has a history of diabetes, hypertension, and hyperlipidemia as well as a family history of coronary artery disease. He was having right-sided chest pain in the fall of . He had an echocardiogram in 2014, which showed normal left ventricular function with an ejection fraction of 62% and no significant valvular heart disease. Because of his chest pain, he had a catheterization in 2014, which showed left main normal, left anterior descending artery 20% proximal lesion, circumflex nondominant with a 20% mid lesion, and right coronary artery dominant with a mid-99% lesion. His inferior wall was felt to be hypokinetic and ejection fraction of 45% to 50%. He had a Xience drug-eluting stent placed to the right coronary artery. He was having more chest pain in 2015 and underwent a stress echocardiogram 5/15, which showed mild left ventricular hypertrophy, normal left ventricular function with an ejection fraction of 60%, normal diastolic filling, and again no valvular heart disease. He exercised for 6 minutes and 59 seconds on the Adithya protocol and had no ST changes, and his stress echocardiogram images were normal. Because his chest pain continued, he went on and had a heart catheterization in 2015, which showed left main normal, left anterior descending artery 20% proximal lesion, and circumflex normal. Right coronary artery had a patent stent in the mid vessel and a 40% mid to distal lesion.  Again, his inferior wall was felt to be hypokinetic with an ejection fraction of 45%.       I saw him back in October 2017.  He was complaining of a right-sided chest pain similar to what he experienced before his stent.  It resolved with nitroglycerin.  I recommended a heart catheterization.  That showed a 40% proximal LAD lesion, normal circumflex, right coronary artery is a patent stent and up to 30% disease.  There is an RV branch with a 40% lesion.  No intervention was performed.    He comes in today for follow-up.  He continues to have occasional chest pain.  It's right-sided.  It doesn't radiate.  Associated with shortness of breath.  If it comes on is severe or doesn't resolve fairly quickly he does use nitroglycerin spray which immediately resolved the discomfort.  He is working on his blood sugar.  He has not been exercising.    Review of Systems   Constitution: Negative for fever, malaise/fatigue, weight gain and weight loss.   HENT: Negative for ear pain, hearing loss, nosebleeds and sore throat.    Eyes: Negative for double vision, pain, vision loss in left eye and vision loss in right eye.   Cardiovascular:        See history of present illness.   Respiratory: Negative for cough, shortness of breath, sleep disturbances due to breathing, snoring and wheezing.    Endocrine: Negative for cold intolerance, heat intolerance and polyuria.   Skin: Negative for itching, poor wound healing and rash.   Musculoskeletal: Negative for joint pain, joint swelling and myalgias.   Gastrointestinal: Negative for abdominal pain, diarrhea, hematochezia, nausea and vomiting.   Genitourinary: Negative for hematuria and hesitancy.   Neurological: Negative for numbness, paresthesias and seizures.   Psychiatric/Behavioral: Negative for depression. The patient is not nervous/anxious.            ECG 12 Lead  Date/Time: 7/5/2018 10:29 AM  Performed by: NGOC MAYO  Authorized by: NGOC MAYO   Comparison: compared with  "previous ECG from 12/8/2017  Similar to previous ECG  Rhythm: sinus rhythm  BPM: 69  Conduction: non-specific intraventricular conduction delay  Clinical impression: abnormal ECG               Objective:     /78 (BP Location: Right arm, Patient Position: Sitting, Cuff Size: Large Adult)   Pulse 69   Resp 16   Ht 188 cm (74\")   Wt (!) 163 kg (360 lb)   BMI 46.22 kg/m²  Body mass index is 46.22 kg/m².     Physical Exam   Constitutional: He appears well-developed.   HENT:   Head: Normocephalic and atraumatic.   Eyes: Conjunctivae and lids are normal. Pupils are equal, round, and reactive to light. Lids are everted and swept, no foreign bodies found.   Neck: Normal range of motion. No JVD present. Carotid bruit is not present. No tracheal deviation present. No thyroid mass present.   Cardiovascular: Normal rate, regular rhythm and normal heart sounds.    Pulses:       Dorsalis pedis pulses are 2+ on the right side, and 2+ on the left side.   Pulmonary/Chest: Effort normal and breath sounds normal.   Abdominal: Normal appearance and bowel sounds are normal.   Musculoskeletal: Normal range of motion.   Neurological: He is alert. He has normal strength.   Skin: Skin is warm, dry and intact.   Psychiatric: He has a normal mood and affect. His behavior is normal.   Vitals reviewed.          Assessment/Plan:       1. Coronary artery disease with a stent to the mid right coronary artery in 12/2014. He had a repeat catheterization in 05/2015 for recurrent chest pain, which showed his stent was patent and he only had nonobstructive lesions noted.  He had another heart catheterization in November 2017 which continued to showed nonobstructive disease.  He is on good medical management.  I encouraged lifestyle changes.  Coronary Artery Disease  Assessment  • The patient has CCS class III - angina with activities of daily living    Plan  • Lifestyle modifications discussed include adhering to a heart healthy diet, " maintenance of a healthy weight and regular exercise    Subjective - Objective  • There has been a previous stent procedure using LIBERTAD on or around 12/15/2014  • Current antiplatelet therapy includes aspirin 81 mg and prasugrel 10 mg      2. Hypertension.  Blood pressure is well controlled.  3. Hyperlipidemia.   4. Diabetes.    5. Obstructive sleep apnea. Compliant with BiPAP.   6. Hypothyroid.   7. Obesity.  Unfortunately, his weight is up and he is not exercising.  8. Vascular screening. He underwent vascular screening in 2015 which was normal.  9.  Chest pain.  He continues with chest pain.  I recommend a stress test approximately one year andrea from his last heart catheterization unless his chest pain gets worse.     I will see him back in 6 months.         Orders Placed This Encounter   Procedures   • Stress Test With Pet Myocardial Perfusion     Standing Status:   Future     Standing Expiration Date:   7/5/2019     Order Specific Question:   What stress agent will be used?     Answer:   Regadenoson (Lexiscan)     Order Specific Question:   Difficulty walking criteria?     Answer:   Musculoskeletal (hips, knees, feet, back, amputee)     Order Specific Question:   Reason for exam?     Answer:   Chest Pain     Order Specific Question:   Reason for exam?     Answer:   Known Coronary Artery Disease   • ECG 12 Lead     This order was created via procedure documentation        Discharge Medications          Accurate as of 7/5/18  2:04 PM. If you have any questions, ask your nurse or doctor.               Continue These Medications      Instructions Start Date   ACCU-CHEK ANN MARIE PLUS test strip  Generic drug:  glucose blood   Use to test BG 5 times daily      ACCU-CHEK SOFTCLIX LANCETS lancets   Use to test BG 5 times daily      acidophilus tablet tablet   1 tablet, Oral, Daily      ammonium lactate 12 % cream  Commonly known as:  AMLACTIN   Topical, As Needed      aspirin 81 MG tablet   81 mg, Oral, Daily       atorvastatin 40 MG tablet  Commonly known as:  LIPITOR   40 mg, Oral, Daily      AVALIDE 300-12.5 MG tablet  Generic drug:  irbesartan-hydrochlorothiazide   1 tablet, Oral, Daily      benzoyl peroxide-erythromycin 5-3 % gel  Commonly known as:  BENZAMYCIN   Topical, As Needed      cetirizine 10 MG tablet  Commonly known as:  zyrTEC   10 mg, Oral, As Needed      CLICKFINE PEN NEEDLES 31G X 6 MM misc  Generic drug:  Insulin Pen Needle   No dose, route, or frequency recorded.      clobetasol 0.05 % cream  Commonly known as:  TEMOVATE   APPLY TO AFFECTED AREA(S) TWO TIMES A DAY      EPINEPHrine 0.3 MG/0.3ML solution auto-injector injection  Commonly known as:  EPIPEN   0.3 mg, Intramuscular      ezetimibe 10 MG tablet  Commonly known as:  ZETIA   10 mg, Oral, Daily      ferrous gluconate 324 MG tablet  Commonly known as:  FERGON   324 mg, Oral      fluticasone 50 MCG/ACT nasal spray  Commonly known as:  FLONASE   2 sprays, As Needed      folic acid 1 MG tablet  Commonly known as:  FOLVITE   1 mg, Oral, Daily      insulin lispro 100 UNIT/ML injection  Commonly known as:  HUMALOG   Up to 150 units daily via pump      isosorbide mononitrate 30 MG 24 hr tablet  Commonly known as:  IMDUR   30 mg, Oral, 2 Times Daily      JARDIANCE 25 MG tablet  Generic drug:  Empagliflozin   1 tablet, Oral, Daily      ketoconazole 2 % shampoo  Commonly known as:  NIZORAL   As Needed      LANTUS SOLOSTAR 100 UNIT/ML injection pen  Generic drug:  Insulin Glargine   70 Units, Subcutaneous, 2 Times Daily      LANTUS SOLOSTAR 100 UNIT/ML injection pen  Generic drug:  Insulin Glargine   INJECT 110 UNITS SUBCUTANEOUSLY TWO TIMES A DAY      levothyroxine 125 MCG tablet  Commonly known as:  SYNTHROID, LEVOTHROID   250 mcg, Oral, Daily      METAMUCIL MULTIHEALTH FIBER 63 % powder  Generic drug:  Psyllium   2 Times Daily      metFORMIN 1000 MG tablet  Commonly known as:  GLUCOPHAGE   1,000 mg, Oral, Daily      metoprolol succinate  MG 24 hr  tablet  Commonly known as:  TOPROL-XL   100 mg, Oral, Daily      nitroglycerin 0.4 MG/SPRAY spray  Commonly known as:  NITROLINGUAL   USE 1 SPRAY UNDER TONGUE FOR CHEST PAIN. IF PAIN REMAINS AFTER 5 MINUTES CALL 911 AND REPEAT DOSE **MAX OF 3 SPRAYS IN 15 MINUTES**      nystatin 978390 UNIT/GM cream  Commonly known as:  MYCOSTATIN   APPLY TO AFFECTED AREA(S) TWO TIMES A DAY PRN      Omega 3 1000 MG capsule   2 po bid with food      oxybutynin XL 10 MG 24 hr tablet  Commonly known as:  DITROPAN-XL   10 mg, Oral, As Needed      prasugrel 10 MG tablet  Commonly known as:  EFFIENT   TAKE ONE TABLET BY MOUTH DAILY      SALINE MIST 0.65 % nasal spray  Generic drug:  sodium chloride   As Needed      sertraline 100 MG tablet  Commonly known as:  ZOLOFT   100 mg, Oral, Daily      TRIPLE ANTIBIOTIC PLUS 1 % ointment  Generic drug:  Bhirt-Kladg-Ycmdbld-Pramoxine   As Needed      VASCEPA 1 g capsule capsule  Generic drug:  icosapent ethyl   TAKE TWO CAPSULES BY MOUTH TWICE A DAY WITH MEALS         Stop These Medications    benzonatate 200 MG capsule  Commonly known as:  TESSALON  Stopped by:  Catrina Horn MD     sulfamethoxazole-trimethoprim 800-160 MG per tablet  Commonly known as:  BACTRIM DS,SEPTRA DS  Stopped by:  MD Catrina Pimentel MD  07/05/18  2:04 PM

## 2018-08-22 RX ORDER — OMEGA-3-ACID ETHYL ESTERS 1 G/1
CAPSULE, LIQUID FILLED ORAL
Qty: 120 CAPSULE | Refills: 4 | OUTPATIENT
Start: 2018-08-22

## 2018-09-04 RX ORDER — NITROGLYCERIN 400 UG/1
SPRAY ORAL
Qty: 4.9 G | Refills: 0 | Status: SHIPPED | OUTPATIENT
Start: 2018-09-04 | End: 2021-02-01 | Stop reason: SDUPTHER

## 2018-09-24 RX ORDER — LANCETS
EACH MISCELLANEOUS
Refills: 0 | OUTPATIENT
Start: 2018-09-24

## 2018-09-24 RX ORDER — PRASUGREL 10 MG/1
TABLET, FILM COATED ORAL
Qty: 30 TABLET | Refills: 5 | Status: SHIPPED | OUTPATIENT
Start: 2018-09-24 | End: 2019-03-19 | Stop reason: SDUPTHER

## 2018-11-20 ENCOUNTER — TELEPHONE (OUTPATIENT)
Dept: CARDIOLOGY | Facility: CLINIC | Age: 43
End: 2018-11-20

## 2018-11-20 NOTE — TELEPHONE ENCOUNTER
Pt wanted to confirm that it was medically necessary for him to have a PET stress test and that the amount of radiation, with his cardiac condition and extensive family history of cancer, was worth pursuing the test. He will still go through with the test as long as you feel it is beneficial

## 2018-11-26 ENCOUNTER — HOSPITAL ENCOUNTER (OUTPATIENT)
Dept: CARDIOLOGY | Facility: HOSPITAL | Age: 43
End: 2018-11-26
Attending: INTERNAL MEDICINE

## 2018-12-10 RX ORDER — ISOSORBIDE MONONITRATE 30 MG/1
TABLET, EXTENDED RELEASE ORAL
Qty: 60 TABLET | Refills: 10 | Status: SHIPPED | OUTPATIENT
Start: 2018-12-10 | End: 2019-11-13 | Stop reason: SDUPTHER

## 2019-03-19 RX ORDER — PRASUGREL 10 MG/1
TABLET, FILM COATED ORAL
Qty: 30 TABLET | Refills: 4 | Status: SHIPPED | OUTPATIENT
Start: 2019-03-19 | End: 2019-08-22 | Stop reason: SDUPTHER

## 2019-03-25 RX ORDER — BLOOD SUGAR DIAGNOSTIC
STRIP MISCELLANEOUS
Refills: 0 | OUTPATIENT
Start: 2019-03-25

## 2019-04-03 ENCOUNTER — OFFICE VISIT (OUTPATIENT)
Dept: CARDIOLOGY | Facility: CLINIC | Age: 44
End: 2019-04-03

## 2019-04-03 VITALS
SYSTOLIC BLOOD PRESSURE: 132 MMHG | DIASTOLIC BLOOD PRESSURE: 80 MMHG | HEIGHT: 74 IN | BODY MASS INDEX: 40.43 KG/M2 | HEART RATE: 78 BPM | WEIGHT: 315 LBS

## 2019-04-03 DIAGNOSIS — Z95.5 PRESENCE OF STENT IN CORONARY ARTERY: ICD-10-CM

## 2019-04-03 DIAGNOSIS — I25.10 CORONARY ARTERIOSCLEROSIS IN NATIVE ARTERY: Primary | ICD-10-CM

## 2019-04-03 DIAGNOSIS — G47.33 OBSTRUCTIVE SLEEP APNEA SYNDROME: ICD-10-CM

## 2019-04-03 DIAGNOSIS — I10 ESSENTIAL HYPERTENSION: ICD-10-CM

## 2019-04-03 DIAGNOSIS — E66.01 MORBID OBESITY DUE TO EXCESS CALORIES (HCC): ICD-10-CM

## 2019-04-03 DIAGNOSIS — E78.5 HYPERLIPIDEMIA, UNSPECIFIED HYPERLIPIDEMIA TYPE: ICD-10-CM

## 2019-04-03 DIAGNOSIS — R07.2 PRECORDIAL PAIN: ICD-10-CM

## 2019-04-03 PROCEDURE — 93000 ELECTROCARDIOGRAM COMPLETE: CPT | Performed by: INTERNAL MEDICINE

## 2019-04-03 PROCEDURE — 99214 OFFICE O/P EST MOD 30 MIN: CPT | Performed by: INTERNAL MEDICINE

## 2019-04-03 NOTE — PROGRESS NOTES
PATIENTINFORMATION    Date of Office Visit: 2019  Encounter Provider: Catrina Horn MD  Place of Service: [facility] CARDIOLOGY  Patient Name: Jonel Garza  : 1975    Subjective:     Encounter Date:2019      Patient ID: Jonel Garza is a 44 y.o. male.      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  He has a history of diabetes, hypertension, and hyperlipidemia as well as a family history of coronary artery disease. He was having right-sided chest pain in the fall of . He had an echocardiogram in 2014, which showed normal left ventricular function with an ejection fraction of 62% and no significant valvular heart disease. Because of his chest pain, he had a catheterization in 2014, which showed left main normal, left anterior descending artery 20% proximal lesion, circumflex nondominant with a 20% mid lesion, and right coronary artery dominant with a mid-99% lesion. His inferior wall was felt to be hypokinetic and ejection fraction of 45% to 50%. He had a Xience drug-eluting stent placed to the right coronary artery. He was having more chest pain in 2015 and underwent a stress echocardiogram 5/15, which showed mild left ventricular hypertrophy, normal left ventricular function with an ejection fraction of 60%, normal diastolic filling, and again no valvular heart disease. He exercised for 6 minutes and 59 seconds on the Adithya protocol and had no ST changes, and his stress echocardiogram images were normal. Because his chest pain continued, he went on and had a heart catheterization in 2015, which showed left main normal, left anterior descending artery 20% proximal lesion, and circumflex normal. Right coronary artery had a patent stent in the mid vessel and a 40% mid to distal lesion.  Again, his inferior wall was felt to be hypokinetic with an ejection fraction of 45%.       I saw him back in October 2017.  He was complaining of a right-sided chest pain similar to what he experienced before his stent.  It resolved with nitroglycerin.  I recommended a heart catheterization.  That showed a 40% proximal LAD lesion, normal circumflex, right coronary artery is a patent stent and up to 30% disease.  There is an RV branch with a 40% lesion.  No intervention was performed.     Unfortunately, the problem with him is that he continues to have atypical right-sided chest pain, which was the same symptoms he had before his stents, so he associates those with his heart, though he has had recurrent heart catheterizations, which have not shown significant coronary disease.  We talked about doing a PET stress test last year, but he worried about radiation given he has already had a couple of CT scans of his head, et cetera.  He does have the atypical right-sided pain.  There are no exacerbating or relieving factors.  He has no radiation.  He is trying to start exercising.        Review of Systems   Constitution: Positive for malaise/fatigue. Negative for fever, weight gain and weight loss.   HENT: Negative for ear pain, hearing loss, nosebleeds and sore throat.    Eyes: Negative for double vision, pain, vision loss in left eye and vision loss in right eye.   Cardiovascular:        See history of present illness.   Respiratory: Positive for shortness of breath and snoring. Negative for cough, sleep disturbances due to breathing and wheezing.    Endocrine: Negative for cold intolerance, heat intolerance and polyuria.   Skin: Positive for itching. Negative for poor wound healing and rash.   Musculoskeletal: Negative for joint pain, joint swelling and myalgias.   Gastrointestinal: Negative for abdominal pain, diarrhea, hematochezia, nausea and vomiting.   Genitourinary: Negative for hematuria and hesitancy.  "  Neurological: Negative for numbness, paresthesias and seizures.   Psychiatric/Behavioral: Negative for depression. The patient is not nervous/anxious.            ECG 12 Lead  Date/Time: 4/3/2019 10:56 AM  Performed by: Catrina Horn MD  Authorized by: Catrina Horn MD   Comparison: compared with previous ECG from 7/5/2018  Similar to previous ECG  Rhythm: sinus rhythm  BPM: 78  Conduction: non-specific intraventricular conduction delay  ST Segments: ST segments normal  T Waves: T waves normal    Clinical impression: abnormal EKG               Objective:     /80 (BP Location: Left arm, Patient Position: Sitting)   Pulse 78   Ht 188 cm (74\")   Wt (!) 169 kg (373 lb)   BMI 47.89 kg/m²  Body mass index is 47.89 kg/m².     Physical Exam   Constitutional: He appears well-developed.   HENT:   Head: Normocephalic and atraumatic.   Eyes: Conjunctivae and lids are normal. Pupils are equal, round, and reactive to light. Lids are everted and swept, no foreign bodies found.   Neck: Normal range of motion. No JVD present. Carotid bruit is not present. No tracheal deviation present. No thyroid mass present.   Cardiovascular: Normal rate, regular rhythm and normal heart sounds.   Pulses:       Dorsalis pedis pulses are 2+ on the right side, and 2+ on the left side.   Pulmonary/Chest: Effort normal and breath sounds normal.   Abdominal: Normal appearance and bowel sounds are normal.   Musculoskeletal: Normal range of motion.   Neurological: He is alert. He has normal strength.   Skin: Skin is warm, dry and intact.   Psychiatric: He has a normal mood and affect. His behavior is normal.   Vitals reviewed.          Assessment/Plan:       1. Coronary artery disease with a stent to the mid right coronary artery in 12/2014. He had a repeat catheterization in 05/2015 for recurrent chest pain, which showed his stent was patent and he only had nonobstructive lesions noted.  He had another heart catheterization in November " 2017 which continued to showed nonobstructive disease.  He is on good medical management.  I encouraged lifestyle changes.  He is reluctant to have the PET stress test.  I think is reasonable for him to start a regular exercise program and if he is able to improve his exercise capacity and is not having ischemic symptoms, I do not feel like further testing is needed at this point he will follow-up with me in 3 months to see how he is doing with the exercise.  Coronary Artery Disease  Assessment  • The patient has CCS class III - angina with activities of daily living    Plan  • Lifestyle modifications discussed include adhering to a heart healthy diet, maintenance of a healthy weight and regular exercise    Subjective - Objective  • There has been a previous stent procedure using LIBERTAD on or around 12/15/2014  • Current antiplatelet therapy includes aspirin 81 mg and prasugrel 10 mg    2. Hypertension.  Blood pressure is well controlled.  3. Hyperlipidemia.   4. Diabetes.    5. Obstructive sleep apnea. Compliant with BiPAP.   6. Hypothyroid.   7. Obesity.  Unfortunately, his weight is up and he is not exercising.  8. Vascular screening. He underwent vascular screening in 2015 which was normal.  9.  Chest pain.  Consider a PET stress if his symptoms do not improve.    Orders Placed This Encounter   Procedures   • ECG 12 Lead     This order was created via procedure documentation        Discharge Medications           Accurate as of 4/3/19 10:57 AM. If you have any questions, ask your nurse or doctor.               Continue These Medications      Instructions Start Date   ACCU-CHEK ANN MARIE PLUS test strip  Generic drug:  glucose blood   Use to test BG 5 times daily      ACCU-CHEK SOFTCLIX LANCETS lancets   Use to test BG 5 times daily      acidophilus tablet tablet   1 tablet, Oral, Daily      ammonium lactate 12 % cream  Commonly known as:  AMLACTIN   Topical, As Needed      aspirin 81 MG tablet   81 mg, Oral, Daily       atorvastatin 40 MG tablet  Commonly known as:  LIPITOR   40 mg, Oral, Daily      AVALIDE 300-12.5 MG tablet  Generic drug:  irbesartan-hydrochlorothiazide   1 tablet, Oral, Daily      benzoyl peroxide-erythromycin 5-3 % gel  Commonly known as:  BENZAMYCIN   Topical, As Needed      Cefixime 400 MG tablet  Commonly known as:  SUPRAX   400 mg, Oral, Daily      cetirizine 10 MG tablet  Commonly known as:  zyrTEC   10 mg, Oral, As Needed      CLICKFINE PEN NEEDLES 31G X 6 MM misc  Generic drug:  Insulin Pen Needle   No dose, route, or frequency recorded.      clobetasol 0.05 % cream  Commonly known as:  TEMOVATE   APPLY TO AFFECTED AREA(S) TWO TIMES A DAY      EPINEPHrine 0.3 MG/0.3ML solution auto-injector injection  Commonly known as:  EPIPEN   0.3 mg, Intramuscular      ezetimibe 10 MG tablet  Commonly known as:  ZETIA   10 mg, Oral, Daily      ferrous gluconate 324 MG tablet  Commonly known as:  FERGON   324 mg, Oral      fluticasone 50 MCG/ACT nasal spray  Commonly known as:  FLONASE   2 sprays, As Needed      folic acid 1 MG tablet  Commonly known as:  FOLVITE   1 mg, Oral, Daily      guaiFENesin 600 MG 12 hr tablet  Commonly known as:  MUCINEX   600 mg, Oral, 2 Times Daily      insulin lispro 100 UNIT/ML injection  Commonly known as:  HUMALOG   Up to 150 units daily via pump      isosorbide mononitrate 30 MG 24 hr tablet  Commonly known as:  IMDUR   TAKE ONE TABLET BY MOUTH TWICE A DAY      JARDIANCE 25 MG tablet  Generic drug:  Empagliflozin   1 tablet, Oral, Daily      ketoconazole 2 % shampoo  Commonly known as:  NIZORAL   As Needed      LANTUS SOLOSTAR 100 UNIT/ML injection pen  Generic drug:  Insulin Glargine   70 Units, Subcutaneous, 2 Times Daily      LANTUS SOLOSTAR 100 UNIT/ML injection pen  Generic drug:  Insulin Glargine   INJECT 110 UNITS SUBCUTANEOUSLY TWO TIMES A DAY      levothyroxine 125 MCG tablet  Commonly known as:  SYNTHROID, LEVOTHROID   250 mcg, Oral, Daily      METAMUCIL MULTIHEALTH FIBER  63 % powder  Generic drug:  Psyllium   2 Times Daily      metFORMIN 1000 MG tablet  Commonly known as:  GLUCOPHAGE   1,000 mg, Oral, Daily      metoprolol succinate  MG 24 hr tablet  Commonly known as:  TOPROL-XL   100 mg, Oral, Daily      nitroglycerin 0.4 MG/SPRAY spray  Commonly known as:  NITROLINGUAL   SPRAY 1 SPRAY UNDER TONGUE FOR CHEST PAIN - IF PAIN REMAINS AFTER 5 MIN, CALL 911 AND REPEAT DOSE. MAX 3 SPRAYS IN 15 MINUTES      nystatin 660397 UNIT/GM cream  Commonly known as:  MYCOSTATIN   APPLY TO AFFECTED AREA(S) TWO TIMES A DAY PRN      Omega 3 1000 MG capsule   2 po bid with food      oxybutynin XL 10 MG 24 hr tablet  Commonly known as:  DITROPAN-XL   10 mg, Oral, As Needed      prasugrel 10 MG tablet  Commonly known as:  EFFIENT   TAKE ONE TABLET BY MOUTH DAILY      promethazine-dextromethorphan 6.25-15 MG/5ML syrup  Commonly known as:  PROMETHAZINE-DM   5 mL, Oral, 4 Times Daily PRN      SALINE MIST 0.65 % nasal spray  Generic drug:  sodium chloride   As Needed      sertraline 100 MG tablet  Commonly known as:  ZOLOFT   100 mg, Oral, Daily      TRIPLE ANTIBIOTIC PLUS 1 % ointment  Generic drug:  Indxh-Jwatb-Tmptzrg-Pramoxine   As Needed      VASCEPA 1 g capsule capsule  Generic drug:  icosapent ethyl   TAKE TWO CAPSULES BY MOUTH TWICE A DAY WITH MEALS                    Catrina Horn MD  04/03/19  10:57 AM

## 2019-08-01 ENCOUNTER — OFFICE (OUTPATIENT)
Dept: URBAN - METROPOLITAN AREA CLINIC 75 | Facility: CLINIC | Age: 44
End: 2019-08-01

## 2019-08-01 VITALS
WEIGHT: 315 LBS | HEIGHT: 74 IN | SYSTOLIC BLOOD PRESSURE: 132 MMHG | DIASTOLIC BLOOD PRESSURE: 78 MMHG | HEART RATE: 67 BPM

## 2019-08-01 DIAGNOSIS — R94.5 ABNORMAL RESULTS OF LIVER FUNCTION STUDIES: ICD-10-CM

## 2019-08-01 DIAGNOSIS — K90.0 CELIAC DISEASE: ICD-10-CM

## 2019-08-01 DIAGNOSIS — D50.9 IRON DEFICIENCY ANEMIA, UNSPECIFIED: ICD-10-CM

## 2019-08-01 PROCEDURE — 99214 OFFICE O/P EST MOD 30 MIN: CPT | Performed by: INTERNAL MEDICINE

## 2019-08-23 RX ORDER — PRASUGREL 10 MG/1
TABLET, FILM COATED ORAL
Qty: 30 TABLET | Refills: 3 | Status: SHIPPED | OUTPATIENT
Start: 2019-08-23 | End: 2019-12-23 | Stop reason: SDUPTHER

## 2019-09-13 ENCOUNTER — OFFICE (OUTPATIENT)
Dept: URBAN - METROPOLITAN AREA CLINIC 46 | Facility: CLINIC | Age: 44
End: 2019-09-13

## 2019-09-13 VITALS — HEIGHT: 74 IN

## 2019-09-13 DIAGNOSIS — K76.0 FATTY (CHANGE OF) LIVER, NOT ELSEWHERE CLASSIFIED: ICD-10-CM

## 2019-09-13 DIAGNOSIS — R94.5 ABNORMAL RESULTS OF LIVER FUNCTION STUDIES: ICD-10-CM

## 2019-09-13 PROCEDURE — 91200 LIVER ELASTOGRAPHY: CPT | Performed by: INTERNAL MEDICINE

## 2019-09-16 ENCOUNTER — ON CAMPUS - OUTPATIENT (OUTPATIENT)
Dept: URBAN - METROPOLITAN AREA HOSPITAL 108 | Facility: HOSPITAL | Age: 44
End: 2019-09-16
Payer: COMMERCIAL

## 2019-09-16 DIAGNOSIS — D50.9 IRON DEFICIENCY ANEMIA, UNSPECIFIED: ICD-10-CM

## 2019-09-16 DIAGNOSIS — K29.50 UNSPECIFIED CHRONIC GASTRITIS WITHOUT BLEEDING: ICD-10-CM

## 2019-09-16 DIAGNOSIS — K31.89 OTHER DISEASES OF STOMACH AND DUODENUM: ICD-10-CM

## 2019-09-16 DIAGNOSIS — K90.0 CELIAC DISEASE: ICD-10-CM

## 2019-09-16 PROCEDURE — 43239 EGD BIOPSY SINGLE/MULTIPLE: CPT | Performed by: INTERNAL MEDICINE

## 2019-10-03 ENCOUNTER — OFFICE VISIT (OUTPATIENT)
Dept: CARDIOLOGY | Facility: CLINIC | Age: 44
End: 2019-10-03

## 2019-10-03 VITALS
HEIGHT: 75 IN | HEART RATE: 71 BPM | WEIGHT: 315 LBS | DIASTOLIC BLOOD PRESSURE: 80 MMHG | BODY MASS INDEX: 39.17 KG/M2 | SYSTOLIC BLOOD PRESSURE: 136 MMHG

## 2019-10-03 DIAGNOSIS — E66.01 MORBID (SEVERE) OBESITY DUE TO EXCESS CALORIES (HCC): ICD-10-CM

## 2019-10-03 DIAGNOSIS — E78.5 HYPERLIPIDEMIA, UNSPECIFIED HYPERLIPIDEMIA TYPE: ICD-10-CM

## 2019-10-03 DIAGNOSIS — R07.2 PRECORDIAL PAIN: ICD-10-CM

## 2019-10-03 DIAGNOSIS — I25.10 CORONARY ARTERIOSCLEROSIS IN NATIVE ARTERY: Primary | ICD-10-CM

## 2019-10-03 DIAGNOSIS — IMO0001 UNCONTROLLED DIABETES MELLITUS TYPE 2 WITHOUT COMPLICATIONS: ICD-10-CM

## 2019-10-03 DIAGNOSIS — G47.33 OBSTRUCTIVE SLEEP APNEA SYNDROME: ICD-10-CM

## 2019-10-03 DIAGNOSIS — I10 ESSENTIAL HYPERTENSION: ICD-10-CM

## 2019-10-03 PROCEDURE — 99214 OFFICE O/P EST MOD 30 MIN: CPT | Performed by: INTERNAL MEDICINE

## 2019-10-03 PROCEDURE — 93000 ELECTROCARDIOGRAM COMPLETE: CPT | Performed by: INTERNAL MEDICINE

## 2019-10-03 RX ORDER — TELMISARTAN AND HYDROCHLORTHIAZIDE 80; 25 MG/1; MG/1
1 TABLET ORAL DAILY
COMMUNITY
Start: 2019-06-21 | End: 2022-10-08 | Stop reason: HOSPADM

## 2019-10-03 NOTE — PROGRESS NOTES
PATIENTINFORMATION    Date of Office Visit: 10/03/2019  Encounter Provider: Catrina Horn MD  Place of Service: [facility] CARDIOLOGY  Patient Name: Jonel Garza  : 1975    Subjective:     Encounter Date:10/03/2019      Patient ID: Jonel Garza is a 44 y.o. male.      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  He has a history of diabetes, hypertension, and hyperlipidemia as well as a family history of coronary artery disease. He was having right-sided chest pain in the fall of . He had an echocardiogram in 2014, which showed normal left ventricular function with an ejection fraction of 62% and no significant valvular heart disease. Because of his chest pain, he had a catheterization in 2014, which showed left main normal, left anterior descending artery 20% proximal lesion, circumflex nondominant with a 20% mid lesion, and right coronary artery dominant with a mid-99% lesion. His inferior wall was felt to be hypokinetic and ejection fraction of 45% to 50%. He had a Xience drug-eluting stent placed to the right coronary artery. He was having more chest pain in 2015 and underwent a stress echocardiogram 5/15, which showed mild left ventricular hypertrophy, normal left ventricular function with an ejection fraction of 60%, normal diastolic filling, and again no valvular heart disease. He exercised for 6 minutes and 59 seconds on the Adithya protocol and had no ST changes, and his stress echocardiogram images were normal. Because his chest pain continued, he went on and had a heart catheterization in 2015, which showed left main normal, left anterior descending artery 20% proximal lesion, and circumflex normal. Right coronary artery had a patent stent in the mid vessel and a 40% mid to distal lesion.  Again, his inferior wall was felt to be hypokinetic with an ejection fraction of 45%.       I saw him back in October 2017.  He was complaining of a right-sided chest pain similar to what he experienced before his stent.  It resolved with nitroglycerin.  I recommended a heart catheterization.  That showed a 40% proximal LAD lesion, normal circumflex, right coronary artery is a patent stent and up to 30% disease.  There is an RV branch with a 40% lesion.  No intervention was performed.    When I saw him in April 2019 he was complaining of some atypical chest pain.  We went over the options as far as PET stress test but he chose to avoid it to avoid radiation.    He comes in today for six-month follow-up.  He is been trying to walk 3-4 times a week.  He is lost about 8 pounds.  He sees his endocrinologist tomorrow to see if any changes can be made to his insulin so that he may lose more weight.  He has a right sided heavy chest pain.  It does not radiate.  Is not associated nausea or diaphoresis.  It can happen with exercise or with rest.  He is concerned about this.    Review of Systems   Constitution: Positive for malaise/fatigue. Negative for fever, weight gain and weight loss.   HENT: Negative for ear pain, hearing loss, nosebleeds and sore throat.    Eyes: Negative for double vision, pain, vision loss in left eye and vision loss in right eye.   Cardiovascular:        See history of present illness.   Respiratory: Negative for cough, shortness of breath, sleep disturbances due to breathing, snoring and wheezing.    Endocrine: Negative for cold intolerance, heat intolerance and polyuria.   Skin: Negative for itching, poor wound healing and rash.   Musculoskeletal: Negative for joint pain, joint swelling and myalgias.   Gastrointestinal: Negative for abdominal pain, diarrhea, hematochezia, nausea and vomiting.   Genitourinary: Negative for hematuria and hesitancy.   Neurological: Positive for paresthesias. Negative  "for numbness and seizures.   Psychiatric/Behavioral: Negative for depression. The patient is not nervous/anxious.            ECG 12 Lead  Date/Time: 10/3/2019 12:44 PM  Performed by: Catrina Horn MD  Authorized by: Catrina oHrn MD   Comparison: compared with previous ECG from 7/5/2018  Rhythm: sinus rhythm  BPM: 71  Conduction: conduction normal  ST Segments: ST segments normal  T Waves: T waves normal    Clinical impression: normal ECG               Objective:     /80 (BP Location: Right arm)   Pulse 71   Ht 190.5 cm (75\")   Wt (!) 166 kg (365 lb)   BMI 45.62 kg/m²  Body mass index is 45.62 kg/m².     Physical Exam   Constitutional: He appears well-developed.   HENT:   Head: Normocephalic and atraumatic.   Eyes: Conjunctivae and lids are normal. Pupils are equal, round, and reactive to light. Lids are everted and swept, no foreign bodies found.   Neck: Normal range of motion. No JVD present. Carotid bruit is not present. No tracheal deviation present. No thyroid mass present.   Cardiovascular: Normal rate, regular rhythm and normal heart sounds.   Pulses:       Dorsalis pedis pulses are 2+ on the right side, and 2+ on the left side.   Pulmonary/Chest: Effort normal and breath sounds normal.   Abdominal: Normal appearance and bowel sounds are normal.   Musculoskeletal: Normal range of motion.   Neurological: He is alert. He has normal strength.   Skin: Skin is warm, dry and intact.   Psychiatric: He has a normal mood and affect. His behavior is normal.   Vitals reviewed.          Assessment/Plan:       1. Coronary artery disease with a stent to the mid right coronary artery in 12/2014. He had a repeat catheterization in 05/2015 for recurrent chest pain, which showed his stent was patent and he only had nonobstructive lesions noted.  He had another heart catheterization in November 2017 which continued to showed nonobstructive disease.  He is on good medical management.  He is still having chest " discomfort.  I am going to have him get a PET stress test.  If that is normal, I would be reassured he has not had progression of his coronary disease.  • There has been a previous stent procedure using LIBERTAD on or around 12/15/2014  • Current antiplatelet therapy includes aspirin 81 mg and prasugrel 10 mg   2. Hypertension.  Blood pressure is well controlled.  3. Hyperlipidemia.   4. Diabetes.    5. Obstructive sleep apnea. Compliant with BiPAP.   6. Hypothyroid.   7. Obesity.  He has lost some weight.  8. Vascular screening. He underwent vascular screening in 2015 which was normal.    Unless his stress test is abnormal, I will plan on seeing him in 6 months.  Orders Placed This Encounter   Procedures   • Stress Test With Pet Myocardial Perfusion (MULTI STUDY, REST AND STRESS)     Standing Status:   Future     Standing Expiration Date:   10/2/2020     Order Specific Question:   What stress agent will be used?     Answer:   Regadenoson (Lexiscan)     Order Specific Question:   Difficulty walking criteria?     Answer:   Morbid Obesity     Order Specific Question:   Reason for exam?     Answer:   Preoperative Cardiac Assessmemt for Vascular Surgery   • ECG 12 Lead     This order was created via procedure documentation        Discharge Medications           Accurate as of 10/3/19 12:45 PM. If you have any questions, ask your nurse or doctor.               Continue These Medications      Instructions Start Date   ACCU-CHEK ANN MARIE PLUS test strip  Generic drug:  glucose blood   Use to test BG 5 times daily      ACCU-CHEK SOFTCLIX LANCETS lancets   Use to test BG 5 times daily      acidophilus tablet tablet   1 tablet, Oral, Daily      ammonium lactate 12 % cream  Commonly known as:  AMLACTIN   Topical, As Needed      aspirin 81 MG tablet   81 mg, Oral, Daily      atorvastatin 40 MG tablet  Commonly known as:  LIPITOR   40 mg, Oral, Daily      benzoyl peroxide-erythromycin 5-3 % gel  Commonly known as:  BENZAMYCIN   Topical,  As Needed      cetirizine 10 MG tablet  Commonly known as:  zyrTEC   10 mg, Oral, As Needed      CLICKFINE PEN NEEDLES 31G X 6 MM misc  Generic drug:  Insulin Pen Needle   No dose, route, or frequency recorded.      clobetasol 0.05 % cream  Commonly known as:  TEMOVATE   APPLY TO AFFECTED AREA(S) TWO TIMES A DAY      ezetimibe 10 MG tablet  Commonly known as:  ZETIA   10 mg, Oral, Daily      ferrous gluconate 324 MG tablet  Commonly known as:  FERGON   324 mg, Oral      fluticasone 50 MCG/ACT nasal spray  Commonly known as:  FLONASE   2 sprays, As Needed      folic acid 1 MG tablet  Commonly known as:  FOLVITE   1 mg, Oral, Daily      insulin lispro 100 UNIT/ML injection  Commonly known as:  HUMALOG   Up to 150 units daily via pump      isosorbide mononitrate 30 MG 24 hr tablet  Commonly known as:  IMDUR   TAKE ONE TABLET BY MOUTH TWICE A DAY      ketoconazole 2 % shampoo  Commonly known as:  NIZORAL   As Needed      LANTUS SOLOSTAR 100 UNIT/ML injection pen  Generic drug:  Insulin Glargine   70 Units, Subcutaneous, 2 Times Daily      LANTUS SOLOSTAR 100 UNIT/ML injection pen  Generic drug:  Insulin Glargine   INJECT 110 UNITS SUBCUTANEOUSLY TWO TIMES A DAY      levothyroxine 125 MCG tablet  Commonly known as:  SYNTHROID, LEVOTHROID   250 mcg, Oral, Daily      METAMUCIL MULTIHEALTH FIBER 63 % powder  Generic drug:  Psyllium   2 Times Daily      metFORMIN 1000 MG tablet  Commonly known as:  GLUCOPHAGE   1,000 mg, Oral, Daily      metoprolol succinate  MG 24 hr tablet  Commonly known as:  TOPROL-XL   100 mg, Oral, Daily      nitroglycerin 0.4 MG/SPRAY spray  Commonly known as:  NITROLINGUAL   SPRAY 1 SPRAY UNDER TONGUE FOR CHEST PAIN - IF PAIN REMAINS AFTER 5 MIN, CALL 911 AND REPEAT DOSE. MAX 3 SPRAYS IN 15 MINUTES      nystatin [id number] UNIT/GM cream  Commonly known as:  MYCOSTATIN   APPLY TO AFFECTED AREA(S) TWO TIMES A DAY PRN      Omega 3 1000 MG capsule   2 po bid with food      oxybutynin XL 10 MG 24 hr  tablet  Commonly known as:  DITROPAN-XL   10 mg, Oral, As Needed      prasugrel 10 MG tablet  Commonly known as:  EFFIENT   TAKE ONE TABLET BY MOUTH DAILY      SALINE MIST 0.65 % nasal spray  Generic drug:  sodium chloride   As Needed      sertraline 100 MG tablet  Commonly known as:  ZOLOFT   100 mg, Oral, Daily      telmisartan-hydrochlorothiazide 80-25 MG per tablet  Commonly known as:  MICARDIS HCT   1 tablet, Oral, Daily      TRIPLE ANTIBIOTIC PLUS 1 % ointment  Generic drug:  Hgofe-Qiyeo-Lpbtbci-Pramoxine   As Needed      VASCEPA 1 g capsule capsule  Generic drug:  icosapent ethyl   TAKE TWO CAPSULES BY MOUTH TWICE A DAY WITH MEALS         Stop These Medications    AVALIDE 300-12.5 MG tablet  Generic drug:  irbesartan-hydrochlorothiazide  Stopped by:  Catrina Horn MD     Cefixime 400 MG capsule  Commonly known as:  SUPRAX  Stopped by:  Catrina Horn MD     guaiFENesin 600 MG 12 hr tablet  Commonly known as:  MUCINEX  Stopped by:  Catrina Horn MD     JARDIANCE 25 MG tablet  Generic drug:  Empagliflozin  Stopped by:  Catrina Horn MD     promethazine-dextromethorphan 6.25-15 MG/5ML syrup  Commonly known as:  PROMETHAZINE-DM  Stopped by:  MD Catrina Pimentel MD  10/03/19  12:45 PM

## 2019-10-07 ENCOUNTER — TELEPHONE (OUTPATIENT)
Dept: CARDIOLOGY | Facility: CLINIC | Age: 44
End: 2019-10-07

## 2019-10-07 NOTE — TELEPHONE ENCOUNTER
PET stress gives us the best pictures. It is pharmacologic.  We cannot do exercise with the PET stress unfortunately.

## 2019-10-07 NOTE — TELEPHONE ENCOUNTER
Regarding: Non-Urgent Medical Question  Contact: [phone]  ----- Message from Mychart, Generic sent at 10/3/2019  2:19 PM EDT -----    Conrad Colindres,  Thanks for your time today. Best wishes for upcoming exam. I just scheduled the test and wanted to verify one thing. Were you wanting a chemical stress or treadmill option? At present, they have the chemical option. Whichever gives us the best dats.  seemed confused. Thank you

## 2019-10-11 ENCOUNTER — HOSPITAL ENCOUNTER (OUTPATIENT)
Dept: CARDIOLOGY | Facility: HOSPITAL | Age: 44
Discharge: HOME | End: 2019-10-11
Admitting: INTERNAL MEDICINE

## 2019-10-11 ENCOUNTER — TELEPHONE (OUTPATIENT)
Dept: CARDIOLOGY | Facility: CLINIC | Age: 44
End: 2019-10-11

## 2019-10-11 VITALS — HEIGHT: 75 IN | WEIGHT: 315 LBS | BODY MASS INDEX: 39.17 KG/M2

## 2019-10-11 DIAGNOSIS — I25.10 CORONARY ARTERIOSCLEROSIS IN NATIVE ARTERY: ICD-10-CM

## 2019-10-11 DIAGNOSIS — R07.2 PRECORDIAL PAIN: ICD-10-CM

## 2019-10-11 LAB
BH CV NUCLEAR PRIOR STUDY: 3
BH CV STRESS BP STAGE 1: NORMAL
BH CV STRESS COMMENTS STAGE 1: NORMAL
BH CV STRESS DOSE REGADENOSON STAGE 1: 0.4
BH CV STRESS DURATION MIN STAGE 1: 0
BH CV STRESS DURATION SEC STAGE 1: 10
BH CV STRESS HR STAGE 1: 94
BH CV STRESS PROTOCOL 1: NORMAL
BH CV STRESS RECOVERY BP: NORMAL MMHG
BH CV STRESS RECOVERY HR: 85 BPM
BH CV STRESS STAGE 1: 1
LV EF NUC BP: 64 %
MAXIMAL PREDICTED HEART RATE: 175 BPM
PERCENT MAX PREDICTED HR: 53.71 %
STRESS BASELINE BP: NORMAL MMHG
STRESS BASELINE HR: 74 BPM
STRESS PERCENT HR: 63 %
STRESS POST EXERCISE DUR SEC: 10 SEC
STRESS POST PEAK BP: NORMAL MMHG
STRESS POST PEAK HR: 94 BPM
STRESS TARGET HR: 149 BPM

## 2019-10-11 PROCEDURE — 93017 CV STRESS TEST TRACING ONLY: CPT

## 2019-10-11 PROCEDURE — 93018 CV STRESS TEST I&R ONLY: CPT | Performed by: INTERNAL MEDICINE

## 2019-10-11 PROCEDURE — 0 RUBIDIUM CHLORIDE: Performed by: INTERNAL MEDICINE

## 2019-10-11 PROCEDURE — 93016 CV STRESS TEST SUPVJ ONLY: CPT | Performed by: INTERNAL MEDICINE

## 2019-10-11 PROCEDURE — 78492 MYOCRD IMG PET MLT RST&STRS: CPT | Performed by: INTERNAL MEDICINE

## 2019-10-11 PROCEDURE — 78492 MYOCRD IMG PET MLT RST&STRS: CPT

## 2019-10-11 PROCEDURE — A9555 RB82 RUBIDIUM: HCPCS | Performed by: INTERNAL MEDICINE

## 2019-10-11 PROCEDURE — 25010000002 REGADENOSON 0.4 MG/5ML SOLUTION: Performed by: INTERNAL MEDICINE

## 2019-10-11 RX ADMIN — RUBIDIUM CHLORIDE RB-82 1 DOSE: 150 INJECTION, SOLUTION INTRAVENOUS at 10:00

## 2019-10-11 RX ADMIN — RUBIDIUM CHLORIDE RB-82 1 DOSE: 150 INJECTION, SOLUTION INTRAVENOUS at 09:50

## 2019-10-11 RX ADMIN — REGADENOSON 0.4 MG: 0.08 INJECTION, SOLUTION INTRAVENOUS at 10:00

## 2019-10-11 NOTE — TELEPHONE ENCOUNTER
Please notify him that his stress test is normal.  No further testing at this time.  Keep follow-up appointment.

## 2019-10-11 NOTE — TELEPHONE ENCOUNTER
Please see note from Dr. Horn - Does not appear to have been intended for the [facility]. Thank you!

## 2019-10-14 NOTE — TELEPHONE ENCOUNTER
Called and left a VM. Will go over results when pt calls us back.    Georgette Carmen, RN  Triage RN [facility]

## 2019-10-17 ENCOUNTER — TRANSCRIBE ORDERS (OUTPATIENT)
Dept: ADMINISTRATIVE | Facility: HOSPITAL | Age: 44
End: 2019-10-17

## 2019-10-17 DIAGNOSIS — D63.1 ANEMIA IN CHRONIC KIDNEY DISEASE (CODE): Primary | ICD-10-CM

## 2019-10-17 DIAGNOSIS — E61.1 IRON DEFICIENCY: ICD-10-CM

## 2019-10-23 ENCOUNTER — HOSPITAL ENCOUNTER (OUTPATIENT)
Dept: INFUSION THERAPY | Facility: HOSPITAL | Age: 44
Discharge: HOME | End: 2019-10-23

## 2019-10-28 RX ORDER — NITROGLYCERIN 400 UG/1
SPRAY ORAL
Qty: 4.9 G | Refills: 0 | Status: SHIPPED | OUTPATIENT
Start: 2019-10-28 | End: 2020-04-27 | Stop reason: SDUPTHER

## 2019-11-13 RX ORDER — ISOSORBIDE MONONITRATE 30 MG/1
TABLET, EXTENDED RELEASE ORAL
Qty: 60 TABLET | Refills: 9 | Status: SHIPPED | OUTPATIENT
Start: 2019-11-13 | End: 2020-10-16

## 2019-12-23 RX ORDER — PRASUGREL 10 MG/1
10 TABLET, FILM COATED ORAL DAILY
Qty: 30 TABLET | Refills: 3 | Status: SHIPPED | OUTPATIENT
Start: 2019-12-23 | End: 2020-04-27

## 2020-04-27 RX ORDER — NITROGLYCERIN 400 UG/1
1 SPRAY ORAL
Qty: 4.9 G | Refills: 0 | Status: SHIPPED | OUTPATIENT
Start: 2020-04-27 | End: 2021-01-28

## 2020-04-27 RX ORDER — PRASUGREL 10 MG/1
TABLET, FILM COATED ORAL
Qty: 30 TABLET | Refills: 2 | Status: SHIPPED | OUTPATIENT
Start: 2020-04-27 | End: 2020-07-22

## 2020-07-22 RX ORDER — PRASUGREL 10 MG/1
TABLET, FILM COATED ORAL
Qty: 30 TABLET | Refills: 1 | Status: SHIPPED | OUTPATIENT
Start: 2020-07-22 | End: 2020-09-25

## 2020-09-25 RX ORDER — PRASUGREL 10 MG/1
TABLET, FILM COATED ORAL
Qty: 30 TABLET | Refills: 0 | Status: SHIPPED | OUTPATIENT
Start: 2020-09-25 | End: 2020-10-28

## 2020-10-16 RX ORDER — ISOSORBIDE MONONITRATE 30 MG/1
TABLET, EXTENDED RELEASE ORAL
Qty: 60 TABLET | Refills: 1 | Status: SHIPPED | OUTPATIENT
Start: 2020-10-16 | End: 2020-12-21

## 2020-10-28 RX ORDER — PRASUGREL 10 MG/1
TABLET, FILM COATED ORAL
Qty: 30 TABLET | Refills: 0 | Status: SHIPPED | OUTPATIENT
Start: 2020-10-28 | End: 2020-11-24

## 2020-11-10 ENCOUNTER — OFFICE VISIT (OUTPATIENT)
Dept: CARDIOLOGY | Facility: CLINIC | Age: 45
End: 2020-11-10

## 2020-11-10 VITALS — WEIGHT: 315 LBS | HEIGHT: 75 IN | BODY MASS INDEX: 39.17 KG/M2

## 2020-11-10 DIAGNOSIS — Z95.5 PRESENCE OF STENT IN CORONARY ARTERY: ICD-10-CM

## 2020-11-10 DIAGNOSIS — E78.49 OTHER HYPERLIPIDEMIA: ICD-10-CM

## 2020-11-10 DIAGNOSIS — E11.59 TYPE 2 DIABETES MELLITUS WITH OTHER CIRCULATORY COMPLICATION, WITH LONG-TERM CURRENT USE OF INSULIN (HCC): ICD-10-CM

## 2020-11-10 DIAGNOSIS — I10 ESSENTIAL HYPERTENSION: ICD-10-CM

## 2020-11-10 DIAGNOSIS — Z79.4 TYPE 2 DIABETES MELLITUS WITH OTHER CIRCULATORY COMPLICATION, WITH LONG-TERM CURRENT USE OF INSULIN (HCC): ICD-10-CM

## 2020-11-10 DIAGNOSIS — G47.33 OBSTRUCTIVE SLEEP APNEA SYNDROME: ICD-10-CM

## 2020-11-10 DIAGNOSIS — R06.09 DOE (DYSPNEA ON EXERTION): ICD-10-CM

## 2020-11-10 DIAGNOSIS — E66.01 MORBID OBESITY DUE TO EXCESS CALORIES (HCC): ICD-10-CM

## 2020-11-10 DIAGNOSIS — I25.10 CORONARY ARTERIOSCLEROSIS IN NATIVE ARTERY: Primary | ICD-10-CM

## 2020-11-10 PROCEDURE — 93000 ELECTROCARDIOGRAM COMPLETE: CPT | Performed by: INTERNAL MEDICINE

## 2020-11-10 PROCEDURE — 99214 OFFICE O/P EST MOD 30 MIN: CPT | Performed by: INTERNAL MEDICINE

## 2020-11-10 NOTE — PROGRESS NOTES
PATIENTINFORMATION    Date of Office Visit: 11/10/20  Encounter Provider: Catrina Horn MD  Place of Service: [facility] CARDIOLOG[facility]  Patient Name: Jonel Garza  : 1975    Subjective:         Patient ID: Jonel Garza is a 45 y.o. male.      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  He has a history of diabetes, hypertension, and hyperlipidemia as well as a family history of coronary artery disease. He was having right-sided chest pain in the fall of . He had an echocardiogram in 2014, which showed normal left ventricular function with an ejection fraction of 62% and no significant valvular heart disease. Because of his chest pain, he had a catheterization in 2014, which showed left main normal, left anterior descending artery 20% proximal lesion, circumflex nondominant with a 20% mid lesion, and right coronary artery dominant with a mid-99% lesion. His inferior wall was felt to be hypokinetic and ejection fraction of 45% to 50%. He had a Xience drug-eluting stent placed to the right coronary artery. He was having more chest pain in 2015 and underwent a stress echocardiogram 5/15, which showed mild left ventricular hypertrophy, normal left ventricular function with an ejection fraction of 60%, normal diastolic filling, and again no valvular heart disease. He exercised for 6 minutes and 59 seconds on the Adithya protocol and had no ST changes, and his stress echocardiogram images were normal. Because his chest pain continued, he went on and had a heart catheterization in 2015, which showed left main normal, left anterior descending artery 20% proximal lesion, and circumflex normal. Right coronary artery had a patent stent in the mid vessel and a 40% mid to distal lesion. Again, his inferior wall was  felt to be hypokinetic with an ejection fraction of 45%.       I saw him back in October 2017.  He was complaining of a right-sided chest pain similar to what he experienced before his stent.  It resolved with nitroglycerin.  I recommended a heart catheterization.  That showed a 40% proximal LAD lesion, normal circumflex, right coronary artery is a patent stent and up to 30% disease.  There is an RV branch with a 40% lesion.  No intervention was performed.     When I saw him in April 2019 he was complaining of some atypical chest pain.  We went over the options as far as PET stress test but he chose to avoid it to avoid radiation.     I saw him in October 2019.  At that time he was complaining of a right-sided heaviness.  Nuclear stress test in October 2019 showed normal perfusion of the myocardium with no ischemia.  Ejection fraction 64%.    He has had several episodes of right-sided chest pain which she attributes as his anginal equivalent.  They are not necessarily exertional.  He takes an aspirin and nitro and eventually they go away.  He is very anxious that there may be worsening of his coronary disease.    Review of Systems   Constitution: Negative for fever, malaise/fatigue, weight gain and weight loss.   HENT: Negative for ear pain, hearing loss, nosebleeds and sore throat.    Eyes: Negative for double vision, pain, vision loss in left eye and vision loss in right eye.   Cardiovascular:        See history of present illness.   Respiratory: Positive for shortness of breath. Negative for cough, sleep disturbances due to breathing, snoring and wheezing.    Endocrine: Negative for cold intolerance, heat intolerance and polyuria.   Skin: Negative for itching, poor wound healing and rash.   Musculoskeletal: Negative for joint pain, joint swelling and myalgias.   Gastrointestinal: Negative for abdominal pain, diarrhea, hematochezia, nausea and vomiting.   Genitourinary: Negative for hematuria and hesitancy.  "  Neurological: Positive for paresthesias. Negative for numbness and seizures.   Psychiatric/Behavioral: Negative for depression. The patient is not nervous/anxious.            ECG 12 Lead    Date/Time: 11/10/2020 1:59 PM  Performed by: Catrina Horn MD  Authorized by: Catrina Horn MD   Comparison: compared with previous ECG from 10/3/2019  Similar to previous ECG  Rhythm: sinus rhythm  BPM: 72  Conduction: conduction normal  ST Segments: ST segments normal  T Waves: T waves normal    Clinical impression: normal ECG               Objective:     Ht 190.5 cm (75\")   Wt (!) 166 kg (365 lb)   BMI 45.62 kg/m²  Body mass index is 45.62 kg/m².     Vitals signs reviewed.   Constitutional:       Appearance: Normal appearance. Well-developed.   Eyes:      General: Lids are normal. Lids are everted, no foreign bodies appreciated.      Conjunctiva/sclera: Conjunctivae normal.      Pupils: Pupils are equal, round, and reactive to light.   HENT:      Head: Normocephalic and atraumatic.   Neck:      Musculoskeletal: Normal range of motion.      Thyroid: No thyroid mass.      Vascular: No carotid bruit or JVD.      Trachea: No tracheal deviation.   Pulmonary:      Effort: Pulmonary effort is normal.      Breath sounds: Normal breath sounds.   Cardiovascular:      Normal rate. Regular rhythm.   Pulses:     Dorsalis pedis: 2+ bilaterally.  Abdominal:      General: Bowel sounds are normal.   Musculoskeletal: Normal range of motion.   Skin:     General: Skin is warm and dry.   Neurological:      Mental Status: Alert.   Psychiatric:         Behavior: Behavior normal.         Lab Review: I reviewed his labs from [place].  His A1c is better.  His lipid panel looks good.  Encouraged exercise to raise HDL      Assessment/Plan:       1. Coronary artery disease with a stent to the mid right coronary artery in 12/2014. He had a repeat catheterization in 05/2015 for recurrent chest pain, which showed his stent was patent and he only " had nonobstructive lesions noted.  He had another heart catheterization in November 2017 which continued to showed nonobstructive disease.    -He has had several normal stress test but again to go ahead and repeat a PET stress test.  To give him peace of mind and hopefully we will keep him out of the Cath Lab.  -Continue medical management.  -Encouraged regular exercise.  2. Hypertension.  Blood pressure is well controlled.  3. Hyperlipidemia.   4. Diabetes.  He is doing much better.  He has an insulin pump.  5. Obstructive sleep apnea. Compliant with BiPAP.   6. Hypothyroid.   7. Obesity.  He has lost some weight.  8. Vascular screening. He underwent vascular screening in 2015 which was normal.    He will get a PET stress test.  Myself or 1 of my nurses will go over the results.  Plan will be to follow-up with him in 1 year unless his stress test is abnormal.     Orders Placed This Encounter   Procedures   • Stress Test With Pet Myocardial Perfusion (MULTI STUDY, REST AND STRESS)     Standing Status:   Future     Standing Expiration Date:   11/10/2021     Order Specific Question:   What stress agent will be used?     Answer:   Regadenoson (Lexiscan)     Order Specific Question:   Difficulty walking criteria?     Answer:   Musculoskeletal (hips, knees, feet, back, amputee)     Order Specific Question:   Difficulty walking criteria?     Answer:   Morbid Obesity     Order Specific Question:   Reason for exam?     Answer:   Angina   • ECG 12 Lead     This order was created via procedure documentation        Discharge Medications          Accurate as of November 10, 2020  2:00 PM. If you have any questions, ask your nurse or doctor.            Continue These Medications      Instructions Start Date   Accu-Chek Guera Plus test strip  Generic drug: glucose blood   Use to test BG 5 times daily      Accu-Chek Softclix Lancets lancets   Use to test BG 5 times daily      acidophilus tablet tablet   1 tablet, Oral, Daily       ammonium lactate 12 % cream  Commonly known as: AMLACTIN   Topical, As Needed      aspirin 81 MG tablet   81 mg, Oral, Daily      atorvastatin 40 MG tablet  Commonly known as: LIPITOR   40 mg, Oral, Daily      benzoyl peroxide-erythromycin 5-3 % gel  Commonly known as: BENZAMYCIN   Topical, As Needed      cetirizine 10 MG tablet  Commonly known as: zyrTEC   10 mg, Oral, As Needed      Clickfine Pen Needles 31G X 6 MM misc  Generic drug: Insulin Pen Needle   No dose, route, or frequency recorded.      clobetasol 0.05 % cream  Commonly known as: TEMOVATE   APPLY TO AFFECTED AREA(S) TWO TIMES A DAY      ezetimibe 10 MG tablet  Commonly known as: ZETIA   10 mg, Oral, Daily      fluticasone 50 MCG/ACT nasal spray  Commonly known as: FLONASE   1 spray, Nasal, As Needed      folic acid 1 MG tablet  Commonly known as: FOLVITE   1 mg, Oral, Daily      insulin lispro 100 UNIT/ML injection  Commonly known as: HumaLOG   Up to 150 units daily via pump      isosorbide mononitrate 30 MG 24 hr tablet  Commonly known as: IMDUR   TAKE ONE TABLET BY MOUTH TWICE A DAY      ketoconazole 2 % shampoo  Commonly known as: NIZORAL   As Needed      levothyroxine 125 MCG tablet  Commonly known as: SYNTHROID, LEVOTHROID   250 mcg, Oral, Daily      Metamucil [employer] Fiber 63 % powder  Generic drug: Psyllium   2 Times Daily      metFORMIN 1000 MG tablet  Commonly known as: GLUCOPHAGE   1,000 mg, Oral, Daily      metoprolol succinate  MG 24 hr tablet  Commonly known as: TOPROL-XL   100 mg, Oral, Daily      nitroglycerin 0.4 MG/SPRAY spray  Commonly known as: NITROLINGUAL   SPRAY 1 SPRAY UNDER TONGUE FOR CHEST PAIN - IF PAIN REMAINS AFTER 5 MIN, CALL 911 AND REPEAT DOSE. MAX 3 SPRAYS IN 15 MINUTES      nitroglycerin 0.4 MG/SPRAY spray  Commonly known as: NITROLINGUAL   1 spray, Sublingual, Every 5 Minutes PRN      nystatin 296704 UNIT/GM cream  Commonly known as: MYCOSTATIN   APPLY TO AFFECTED AREA(S) TWO TIMES A DAY PRN      Omega 3  1000 MG capsule   2 po bid with food      oxybutynin XL 10 MG 24 hr tablet  Commonly known as: DITROPAN-XL   10 mg, Oral, As Needed      prasugrel 10 MG tablet  Commonly known as: EFFIENT   TAKE ONE TABLET BY MOUTH DAILY      SALINE MIST 0.65 % nasal spray  Generic drug: sodium chloride   As Needed      sertraline 100 MG tablet  Commonly known as: ZOLOFT   100 mg, Oral, Daily      telmisartan-hydrochlorothiazide 80-25 MG per tablet  Commonly known as: MICARDIS HCT   1 tablet, Oral, Daily      Triple Antibiotic Plus 1 % ointment  Generic drug: Ymwjb-Audld-Tfseefk-Pramoxine   As Needed      Vascepa 1 g capsule capsule  Generic drug: icosapent ethyl   TAKE TWO CAPSULES BY MOUTH TWICE A DAY WITH MEALS         Stop These Medications    Lantus SoloStar 100 UNIT/ML injection pen  Generic drug: Insulin Glargine  Stopped by: MD Catrina Pimentel MD  11/10/20  14:00 EST

## 2020-11-25 RX ORDER — PRASUGREL 10 MG/1
TABLET, FILM COATED ORAL
Qty: 90 TABLET | Refills: 3 | Status: SHIPPED | OUTPATIENT
Start: 2020-11-25 | End: 2021-11-22

## 2020-12-16 ENCOUNTER — HOSPITAL ENCOUNTER (OUTPATIENT)
Dept: CARDIOLOGY | Facility: HOSPITAL | Age: 45
Discharge: HOME | End: 2020-12-16

## 2020-12-21 RX ORDER — ISOSORBIDE MONONITRATE 30 MG/1
TABLET, EXTENDED RELEASE ORAL
Qty: 60 TABLET | Refills: 11 | Status: SHIPPED | OUTPATIENT
Start: 2020-12-21 | End: 2022-01-04

## 2020-12-30 ENCOUNTER — TELEPHONE (OUTPATIENT)
Dept: CARDIOLOGY | Facility: CLINIC | Age: 45
End: 2020-12-30

## 2020-12-30 ENCOUNTER — HOSPITAL ENCOUNTER (OUTPATIENT)
Dept: CARDIOLOGY | Facility: HOSPITAL | Age: 45
Discharge: HOME | End: 2020-12-30
Admitting: INTERNAL MEDICINE

## 2020-12-30 VITALS — WEIGHT: 315 LBS | BODY MASS INDEX: 40.43 KG/M2 | HEIGHT: 74 IN

## 2020-12-30 DIAGNOSIS — I25.10 CORONARY ARTERIOSCLEROSIS IN NATIVE ARTERY: ICD-10-CM

## 2020-12-30 DIAGNOSIS — I10 ESSENTIAL HYPERTENSION: ICD-10-CM

## 2020-12-30 DIAGNOSIS — R06.09 DOE (DYSPNEA ON EXERTION): ICD-10-CM

## 2020-12-30 DIAGNOSIS — Z95.5 PRESENCE OF STENT IN CORONARY ARTERY: ICD-10-CM

## 2020-12-30 LAB
BH CV NUCLEAR PRIOR STUDY: 1
BH CV STRESS BP STAGE 1: NORMAL
BH CV STRESS COMMENTS STAGE 1: NORMAL
BH CV STRESS DOSE REGADENOSON STAGE 1: 0.4
BH CV STRESS DURATION MIN STAGE 1: 0
BH CV STRESS DURATION SEC STAGE 1: 10
BH CV STRESS HR STAGE 1: 92
BH CV STRESS PROTOCOL 1: NORMAL
BH CV STRESS RECOVERY BP: NORMAL MMHG
BH CV STRESS RECOVERY HR: 81 BPM
BH CV STRESS STAGE 1: 1
LV EF NUC BP: 66 %
MAXIMAL PREDICTED HEART RATE: 174 BPM
PERCENT MAX PREDICTED HR: 52.87 %
STRESS BASELINE BP: NORMAL MMHG
STRESS BASELINE HR: 76 BPM
STRESS PERCENT HR: 62 %
STRESS POST EXERCISE DUR SEC: 10 SEC
STRESS POST PEAK BP: NORMAL MMHG
STRESS POST PEAK HR: 92 BPM
STRESS TARGET HR: 148 BPM

## 2020-12-30 PROCEDURE — 93017 CV STRESS TEST TRACING ONLY: CPT

## 2020-12-30 PROCEDURE — 78492 MYOCRD IMG PET MLT RST&STRS: CPT

## 2020-12-30 PROCEDURE — 0 RUBIDIUM CHLORIDE: Performed by: INTERNAL MEDICINE

## 2020-12-30 PROCEDURE — 93016 CV STRESS TEST SUPVJ ONLY: CPT | Performed by: INTERNAL MEDICINE

## 2020-12-30 PROCEDURE — 25010000002 REGADENOSON 0.4 MG/5ML SOLUTION: Performed by: INTERNAL MEDICINE

## 2020-12-30 PROCEDURE — 93018 CV STRESS TEST I&R ONLY: CPT | Performed by: INTERNAL MEDICINE

## 2020-12-30 PROCEDURE — A9555 RB82 RUBIDIUM: HCPCS | Performed by: INTERNAL MEDICINE

## 2020-12-30 PROCEDURE — 78492 MYOCRD IMG PET MLT RST&STRS: CPT | Performed by: INTERNAL MEDICINE

## 2020-12-30 RX ADMIN — REGADENOSON 0.4 MG: 0.08 INJECTION, SOLUTION INTRAVENOUS at 13:01

## 2020-12-31 NOTE — PROGRESS NOTES
PATIENTINFORMATION    Date of Office Visit: 2017  Encounter Provider: Catrina Horn MD  Place of Service: [facility] CARDIOLOGY  Patient Name: Jonel Garza  : 1975    Subjective:     Encounter Date:2017      Patient ID: Jonel Garza is a 42 y.o. male.      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  He has a history of diabetes, hypertension, and hyperlipidemia as well as a family history of coronary artery disease. He was having right-sided chest pain in the fall of . He had an echocardiogram in 2014, which showed normal left ventricular function with an ejection fraction of 62% and no significant valvular heart disease. Because of his chest pain, he had a catheterization in 2014, which showed left main normal, left anterior descending artery 20% proximal lesion, circumflex nondominant with a 20% mid lesion, and right coronary artery dominant with a mid-99% lesion. His inferior wall was felt to be hypokinetic and ejection fraction of 45% to 50%. He had a Xience drug-eluting stent placed to the right coronary artery. He was having more chest pain in 2015 and underwent a stress echocardiogram 5/15, which showed mild left ventricular hypertrophy, normal left ventricular function with an ejection fraction of 60%, normal diastolic filling, and again no valvular heart disease. He exercised for 6 minutes and 59 seconds on the Adithya protocol and had no ST changes, and his stress echocardiogram images were normal. Because his chest pain continued, he went on and had a heart catheterization in 2015, which showed left main normal, left anterior descending artery 20% proximal lesion, and circumflex normal. Right coronary artery had a patent stent in the mid vessel and a 40% mid to distal lesion.  Again, his inferior wall was felt to be hypokinetic with an ejection fraction of 45%.       I saw him back in October 2017.  He was complaining of a right-sided chest pain similar to what he experienced before his stent.  It resolved with nitroglycerin.  I recommended a heart catheterization.  That showed a 40% proximal LAD lesion, normal circumflex, right coronary artery is a patent stent and up to 30% disease.  There is an RV branch with a 40% lesion.  No intervention was performed.    He comes in today for follow-up.  He had no complications from his catheter.  He said a couple of episodes of the same chest pain which is required short acting nitroglycerin.  He recently tried taking 30 of Imdur in the morning and 30 in the evening and taking his beta blocker in the morning and set up at night.  So far he feels that this might be helping.    Review of Systems   Constitution: Negative for fever, malaise/fatigue, weight gain and weight loss.   HENT: Negative for ear pain, hearing loss, nosebleeds and sore throat.    Eyes: Negative for double vision, pain, vision loss in left eye and vision loss in right eye.   Cardiovascular:        See history of present illness.   Respiratory: Negative for cough, shortness of breath, sleep disturbances due to breathing, snoring and wheezing.    Endocrine: Negative for cold intolerance, heat intolerance and polyuria.   Skin: Negative for itching, poor wound healing and rash.   Musculoskeletal: Negative for joint pain, joint swelling and myalgias.   Gastrointestinal: Negative for abdominal pain, diarrhea, hematochezia, nausea and vomiting.   Genitourinary: Negative for hematuria and hesitancy.   Neurological: Negative for numbness, paresthesias and seizures.   Psychiatric/Behavioral: Negative for depression. The patient is not nervous/anxious.            ECG 12 Lead  Date/Time: 12/8/2017 12:57 PM  Performed by: NGOC MAYO  Authorized by: NGOC MAYO   Comparison: compared  "with previous ECG from 10/31/2017  Similar to previous ECG  Rhythm: sinus rhythm  BPM: 70  Conduction: non-specific intraventricular conduction delay  Clinical impression: abnormal ECG               Objective:     /72 (BP Location: Right arm, Patient Position: Sitting, Cuff Size: Adult)  Pulse 70  Resp 16  Ht 188 cm (74\")  Wt (!) 159 kg (351 lb 3.2 oz)  BMI 45.09 kg/m2 Body mass index is 45.09 kg/(m^2).     Physical Exam   Constitutional: He appears well-developed.   HENT:   Head: Normocephalic and atraumatic.   Eyes: Conjunctivae and lids are normal. Pupils are equal, round, and reactive to light. Lids are everted and swept, no foreign bodies found.   Neck: Normal range of motion. No JVD present. Carotid bruit is not present. No tracheal deviation present. No thyroid mass present.   Cardiovascular: Normal rate, regular rhythm and normal heart sounds.    Pulses:       Dorsalis pedis pulses are 2+ on the right side, and 2+ on the left side.   Pulmonary/Chest: Effort normal and breath sounds normal.   Abdominal: Normal appearance and bowel sounds are normal.   Musculoskeletal: Normal range of motion.   Neurological: He is alert. He has normal strength.   Skin: Skin is warm, dry and intact.   Psychiatric: He has a normal mood and affect. His behavior is normal.   Vitals reviewed.          Assessment/Plan:       1. Coronary artery disease with a stent to the mid right coronary artery in 12/2014. He had a repeat catheterization in 05/2015 for recurrent chest pain, which showed his stent was patent and he only had nonobstructive lesions noted.  He had another heart catheterization in November 2017 which continued to show nonobstructive disease.  He is on good medical management.  I encouraged lifestyle changes.  Coronary Artery Disease  Assessment  • The patient has CCS class III - angina with activities of daily living    Plan  • Lifestyle modifications discussed include adhering to a heart healthy diet, " maintenance of a healthy weight and regular exercise    Subjective - Objective  • There has been a previous stent procedure using LIBERTAD on or around 12/15/2014  • Current antiplatelet therapy includes aspirin 81 mg and prasugrel 10 mg    2. Hypertension.  Blood pressure is well controlled.  3. Hyperlipidemia.   4. Diabetes.    5. Obstructive sleep apnea. Compliant with BiPAP.   6. Hypothyroid.   7. Obesity.  Unfortunately, his weight is unchanged and he has not exercising.  8. Vascular screening. He underwent vascular screening in 2015 which was normal.    I will see him back in 6 months.       Orders Placed This Encounter   Procedures   • ECG 12 Lead     This order was created via procedure documentation      Jonel Garza   Home Medication Instructions DEZ:    Printed on:12/08/17 1960   Medication Information                      ACCU-CHEK ANN MARIE PLUS test strip  Use to test BG 3 times daily             ACCU-CHEK SOFTCLIX LANCETS lancets  Use to test BG 3 times daily             acidophilus (FLORANEX) tablet tablet  Take 1 tablet by mouth Daily.             ammonium lactate (AMLACTIN) 12 % cream  Apply  topically As Needed.             aspirin 81 MG tablet  Take 81 mg by mouth daily.             atorvastatin (LIPITOR) 40 MG tablet  Take 40 mg by mouth Daily.             benzoyl peroxide-erythromycin (BENZAMYCIN) 5-3 % gel  Apply  topically As Needed.             cetirizine (ZyrTEC) 10 MG tablet  Take 10 mg by mouth As Needed.             CLICKFINE PEN NEEDLES 31G X 6 MM misc               clobetasol (TEMOVATE) 0.05 % cream  APPLY TO AFFECTED AREA(S) TWO TIMES A DAY             clobetasol (TEMOVATE) 0.05 % ointment  As Needed.             Empagliflozin (JARDIANCE) 25 MG tablet  Take 1 tablet by mouth Daily.             ezetimibe (ZETIA) 10 MG tablet  Take 10 mg by mouth daily.             fluticasone (FLONASE) 50 MCG/ACT nasal spray  2 sprays As Needed.             folic acid (FOLVITE) 1 MG tablet  Take 1 mg by mouth  Daily.             Icosapent Ethyl (VASCEPA PO)  Take 2 g by mouth 2 (Two) Times a Day.             insulin glargine (LANTUS) 100 UNIT/ML injection  Inject 65 Units under the skin 2 (Two) Times a Day.             Insulin Lispro (HUMALOG KWIKPEN) 100 UNIT/ML solution pen-injector  Inject 30 Units under the skin 3 (Three) Times a Day Before Meals.             irbesartan-hydrochlorothiazide (AVALIDE) 300-12.5 MG tablet  Take 1 tablet by mouth Daily.             isosorbide mononitrate (IMDUR) 30 MG 24 hr tablet  Take 1 tablet by mouth 2 (Two) Times a Day.             ketoconazole (NIZORAL) 2 % shampoo  As Needed.             levothyroxine (SYNTHROID, LEVOTHROID) 125 MCG tablet  Take 250 mcg by mouth Daily.             METAMUCIL M[employer] FIBER 63 % powder  2 (Two) Times a Day.             metFORMIN (GLUCOPHAGE) 1000 MG tablet  Take 1 tablet by mouth Daily.             metoprolol succinate XL (TOPROL-XL) 100 MG 24 hr tablet  Take 100 mg by mouth daily.             nitroglycerin (NITROSTAT) 0.4 MG SL tablet  Place 0.4 mg under the tongue Every 5 (Five) Minutes As Needed for Chest Pain. Take no more than 3 doses in 15 minutes.             nystatin (MYCOSTATIN) [id number] UNIT/GM cream  APPLY TO AFFECTED AREA(S) TWO TIMES A DAY             oxybutynin XL (DITROPAN-XL) 10 MG 24 hr tablet  Take 10 mg by mouth As Needed.             prasugrel (EFFIENT) 10 MG tablet  Take 10 mg by mouth Daily.             SALINE MIST 0.65 % nasal spray  As Needed.             sertraline (ZOLOFT) 100 MG tablet  Take 100 mg by mouth daily.             TRIPLE ANTIBIOTIC PLUS 1 % ointment  As Needed.                        Catrina Horn MD  12/08/17  1:01 PM     No

## 2021-01-28 RX ORDER — NITROGLYCERIN 400 UG/1
SPRAY ORAL
Qty: 4.9 EACH | Refills: 0 | Status: SHIPPED | OUTPATIENT
Start: 2021-01-28 | End: 2021-02-01 | Stop reason: SDUPTHER

## 2021-01-29 ENCOUNTER — TELEPHONE (OUTPATIENT)
Dept: CARDIOLOGY | Facility: CLINIC | Age: 46
End: 2021-01-29

## 2021-02-01 RX ORDER — NITROGLYCERIN 0.4 MG/1
TABLET SUBLINGUAL
Qty: 30 TABLET | Refills: 0 | Status: SHIPPED | OUTPATIENT
Start: 2021-02-01 | End: 2021-09-23

## 2021-03-31 ENCOUNTER — BULK ORDERING (OUTPATIENT)
Dept: CASE MANAGEMENT | Facility: OTHER | Age: 46
End: 2021-03-31

## 2021-03-31 DIAGNOSIS — Z23 IMMUNIZATION DUE: ICD-10-CM

## 2021-05-23 NOTE — PROGRESS NOTES
SURGERY  Jonel Garza   1975 05/24/21    Chief Complaint:  epigastric hernia    HPI    Mr. Garza is a  nice 46 y.o. male referred by LOBO Ewing, with a suspected hernia hernia.  He has been to medical school and thus understands a lot of the technical terms and that makes it easier for me to describe to him the situation.  In specific he tells me that he has a protrusion in his midline that is particularly noticeable whenever he uses his abdominal musculature, almost pathognomonic as his description of it protruding out in a line when he does a sit up.  He is not having pain with it necessarily although he is having back pain.  He is concerned about what kind of restrictions he has and what kind of exercises he can do and if there is need for surgery.    He has up-to-date on his colonoscopy    Past Medical History:   Diagnosis Date   • CAD in native artery    • Celiac disease    • Coronary arteriosclerosis    • Diabetes mellitus (CMS/HCC)     insulin pump   • Hyperlipidemia    • Hypertension    • Hypothyroidism    • Obesity    • FARZAD on CPAP    • Precordial pain    • Type 2 diabetes mellitus (CMS/HCC)      Past Surgical History:   Procedure Laterality Date   • CARDIAC CATHETERIZATION N/A 11/8/2017    Procedure: Coronary angiography;  Surgeon: Zack Fernando MD;  Location: [facility] INVASIVE LOCATION;  Service:    • CARDIAC CATHETERIZATION N/A 11/8/2017    Procedure: Left Heart Cath;  Surgeon: Zack Fernando MD;  [facility] C[facility]VASIVE LOC[facility];  Service:    • CARDIAC CATHETERIZATION N/A 11/8/2017    Procedure: Left ventriculography;  Surgeon: Zack Fernando MD;  Location: [facility] INVASIVE LOCATION;  Service:    • CARDIAC CATHETERIZATION  2015   • COLONOSCOPY N/A 11/06/2014   • CORONARY ANGIOPLASTY WITH STENT PLACEMENT      Drug-eluting stent placed to the mid right coronary artery in December 2014   • TONSILLECTOMY     • UPPER GASTROINTESTINAL ENDOSCOPY N/A 09/16/2019     Family  History   Problem Relation Age of Onset   • Hypertension Mother    • Heart disease Mother    • Stroke Mother         CVA   • Pancreatic cancer Mother    • Heart disease Father    • Hypertension Sister    • Hypertension Brother    • Diabetes type II Brother      Social History     Socioeconomic History   • Marital status: Single     Spouse name: Not on file   • Number of children: Not on file   • Years of education: Not on file   • Highest education level: Not on file   Tobacco Use   • Smoking status: Never Smoker   • Smokeless tobacco: Never Used   Vaping Use   • Vaping Use: Never used   Substance and Sexual Activity   • Alcohol use: No     Comment: occasional caffeine use   • Drug use: No   • Sexual activity: Defer         Current Outpatient Medications:   •  ACCU-CHEK ANN MARIE PLUS test strip, Use to test BG 5 times daily, Disp: 500 each, Rfl: 0  •  ACCU-CHEK SOFTCLIX LANCETS lancets, Use to test BG 5 times daily, Disp: 500 each, Rfl: 0  •  acidophilus (FLORANEX) tablet tablet, Take 1 tablet by mouth Daily., Disp: , Rfl:   •  ammonium lactate (AMLACTIN) 12 % cream, Apply  topically As Needed., Disp: , Rfl:   •  aspirin 81 MG tablet, Take 81 mg by mouth daily., Disp: , Rfl:   •  atorvastatin (LIPITOR) 40 MG tablet, Take 40 mg by mouth Daily., Disp: , Rfl:   •  benzoyl peroxide-erythromycin (BENZAMYCIN) 5-3 % gel, Apply  topically As Needed., Disp: , Rfl:   •  cetirizine (ZyrTEC) 10 MG tablet, Take 10 mg by mouth As Needed., Disp: , Rfl:   •  chlorthalidone (HYGROTON) 25 MG tablet, Take 25 mg by mouth Daily., Disp: , Rfl:   •  CLICKFINE PEN NEEDLES 31G X 6 MM misc, , Disp: , Rfl: 11  •  clobetasol (TEMOVATE) 0.05 % cream, APPLY TO AFFECTED AREA(S) TWO TIMES A DAY, Disp: , Rfl:   •  Dapagliflozin Propanediol 10 MG tablet, Take 10 mg by mouth Daily., Disp: , Rfl:   •  ezetimibe (ZETIA) 10 MG tablet, Take 10 mg by mouth daily., Disp: , Rfl:   •  ferrous gluconate (FERGON) 324 MG tablet, Take 324 mg by mouth Daily As  Needed., Disp: , Rfl:   •  fluticasone (FLONASE) 50 MCG/ACT nasal spray, 1 spray into the nostril(s) as directed by provider As Needed., Disp: , Rfl:   •  folic acid (FOLVITE) 1 MG tablet, Take 1 mg by mouth Daily., Disp: , Rfl:   •  insulin lispro (Admelog) 100 UNIT/ML injection, INJECT MAXIMUM DOSE  UNITS DAILY., Disp: , Rfl:   •  insulin lispro (HUMALOG) 100 UNIT/ML injection, Up to 150 units daily via pump, Disp: 50 mL, Rfl: 2  •  isosorbide mononitrate (IMDUR) 30 MG 24 hr tablet, TAKE ONE TABLET BY MOUTH TWICE A DAY, Disp: 60 tablet, Rfl: 11  •  ketoconazole (NIZORAL) 2 % shampoo, As Needed., Disp: , Rfl:   •  levothyroxine (SYNTHROID, LEVOTHROID) 125 MCG tablet, Take 250 mcg by mouth Daily., Disp: , Rfl:   •  METAMUCIL MULTIHEALTH FIBER 63 % powder, 2 (Two) Times a Day., Disp: , Rfl:   •  metFORMIN (GLUCOPHAGE) 1000 MG tablet, Take 1 tablet by mouth Daily., Disp: , Rfl:   •  metoprolol succinate XL (TOPROL-XL) 100 MG 24 hr tablet, Take 100 mg by mouth daily., Disp: , Rfl:   •  nitroglycerin (NITROSTAT) 0.4 MG SL tablet, 1 under the tongue as needed for angina, may repeat q5mins for up three doses, Disp: 30 tablet, Rfl: 0  •  nystatin (MYCOSTATIN) 465702 UNIT/GM cream, APPLY TO AFFECTED AREA(S) TWO TIMES A DAY PRN, Disp: , Rfl:   •  Omega 3 1000 MG capsule, 2 po bid with food, Disp: 120 each, Rfl: 5  •  oxybutynin XL (DITROPAN-XL) 10 MG 24 hr tablet, Take 10 mg by mouth As Needed., Disp: , Rfl:   •  prasugrel (EFFIENT) 10 MG tablet, TAKE ONE TABLET BY MOUTH DAILY, Disp: 90 tablet, Rfl: 3  •  SALINE MIST 0.65 % nasal spray, As Needed., Disp: , Rfl:   •  sertraline (ZOLOFT) 100 MG tablet, Take 100 mg by mouth daily., Disp: , Rfl:   •  telmisartan-hydrochlorothiazide (MICARDIS HCT) 80-25 MG per tablet, Take 1 tablet by mouth Daily., Disp: , Rfl:   •  TRIPLE ANTIBIOTIC PLUS 1 % ointment, As Needed., Disp: , Rfl:   •  VASCEPA 1 g capsule capsule, TAKE TWO CAPSULES BY MOUTH TWICE A DAY WITH MEALS, Disp: 120  "capsule, Rfl: 4    Allergies   Allergen Reactions   • Ace Inhibitors Anaphylaxis   • Iodinated Diagnostic Agents Nausea And Vomiting   • Quinapril Anaphylaxis and Swelling     angioedema   • Dobutamine Nausea And Vomiting   • Rosuvastatin Calcium Other (See Comments)     rhabdomyolisis   • Latex Rash     Review of Systems  Negative for chest pain shortness of breath or cough.  All other systems reviewed and negative    Vitals:    05/24/21 1335   Weight: (!) 165 kg (363 lb)   Height: 188 cm (74\")       PHYSICAL EXAM:    Ht 188 cm (74\")   Wt (!) 165 kg (363 lb)   BMI 46.61 kg/m²   Body mass index is 46.61 kg/m².    Constitutional: well developed, well nourished, appears  healthy, stated age  ENMT: Hearing intact, neck without visible masses  Respiratory:  normal respiratory effort   Gastrointestinal: abdomen soft, nontender, abdominal hernia not detected, but with an obvious ventral diastases, at least 15 cm in length, upper abdomen, 6 cm in width, incisional scar at the umbilicus without hernia.  Genitourinary: Diabetic pump right flank  Musculoskeletal: gait normal, muscle mass normal  Neurological: awake and alert, seems to have reasonable capacity for understanding for medical decision making  Psychiatric: appears to have reasonable judgement, pleasant    Radiographic/Lab Findings: None    Pamphlet reviewed: Website describing exercises for diastases recti, there not being any educational material offered in epic which I queried    IMPRESSION:  · Diastases recti  · Body mass index is 46.61 kg/m².,  Medical definition of morbid obesity  · Diabetes    PLAN:  · Described the core exercises are probably the best thing to try to minimize the visual findings of the diastases recti.  Described that it is not a medical illness or surgical issue other than cosmesis.  All for that I think is unlikely that a plastic surgeon would fix it laparoscopically as if not really in her skill set.  Also noted that if he does that, " then there is risk for hernia because of the imbrication and the tension placed at the midline to bring the rectus muscles back together.  Described the etiology of diastases recti, with wide set rectus muscles.  · Physical therapy ordered at his request, to help with exercises to try to diminish its effect.  · Weight loss will help reduce pressure on the tissue in this reduce the risk of protrusion.    Fadumo Reyna MD  14:27 EDT    In order to provide a more personal and interactive patient experience as well as improve efficiency, this note was started prior to the office visit.

## 2021-05-24 ENCOUNTER — OFFICE VISIT (OUTPATIENT)
Dept: SURGERY | Facility: CLINIC | Age: 46
End: 2021-05-24

## 2021-05-24 VITALS — WEIGHT: 315 LBS | HEIGHT: 74 IN | BODY MASS INDEX: 40.43 KG/M2

## 2021-05-24 DIAGNOSIS — E66.01 MORBID (SEVERE) OBESITY DUE TO EXCESS CALORIES (HCC): ICD-10-CM

## 2021-05-24 DIAGNOSIS — M62.08 DIASTASIS RECTI: Primary | ICD-10-CM

## 2021-05-24 DIAGNOSIS — E11.59 TYPE 2 DIABETES MELLITUS WITH OTHER CIRCULATORY COMPLICATION, WITH LONG-TERM CURRENT USE OF INSULIN (HCC): ICD-10-CM

## 2021-05-24 DIAGNOSIS — Z79.4 TYPE 2 DIABETES MELLITUS WITH OTHER CIRCULATORY COMPLICATION, WITH LONG-TERM CURRENT USE OF INSULIN (HCC): ICD-10-CM

## 2021-05-24 PROCEDURE — 99242 OFF/OP CONSLTJ NEW/EST SF 20: CPT | Performed by: SURGERY

## 2021-05-24 RX ORDER — CHLORTHALIDONE 25 MG/1
25 TABLET ORAL DAILY
COMMUNITY
Start: 2021-05-08 | End: 2022-10-08 | Stop reason: HOSPADM

## 2021-05-24 RX ORDER — OMEGA-3-ACID ETHYL ESTERS 1 G/1
CAPSULE, LIQUID FILLED ORAL
COMMUNITY
Start: 2021-04-22 | End: 2021-05-24 | Stop reason: SDUPTHER

## 2021-05-24 RX ORDER — ERTUGLIFLOZIN 5 MG/1
5 TABLET, FILM COATED ORAL EVERY MORNING
COMMUNITY
Start: 2021-05-08 | End: 2021-05-24

## 2021-05-24 RX ORDER — DOXYCYCLINE HYCLATE 50 MG/1
324 CAPSULE, GELATIN COATED ORAL DAILY PRN
COMMUNITY
Start: 2015-05-14 | End: 2028-04-02

## 2021-09-23 RX ORDER — NITROGLYCERIN 0.4 MG/1
TABLET SUBLINGUAL
Qty: 25 TABLET | Refills: 1 | Status: SHIPPED | OUTPATIENT
Start: 2021-09-23 | End: 2022-02-16 | Stop reason: SDUPTHER

## 2021-10-20 ENCOUNTER — OFFICE (OUTPATIENT)
Dept: URBAN - METROPOLITAN AREA CLINIC 75 | Facility: CLINIC | Age: 46
End: 2021-10-20
Payer: COMMERCIAL

## 2021-10-20 VITALS
SYSTOLIC BLOOD PRESSURE: 128 MMHG | HEART RATE: 78 BPM | OXYGEN SATURATION: 97 % | HEIGHT: 74 IN | WEIGHT: 315 LBS | DIASTOLIC BLOOD PRESSURE: 74 MMHG

## 2021-10-20 DIAGNOSIS — K62.5 HEMORRHAGE OF ANUS AND RECTUM: ICD-10-CM

## 2021-10-20 DIAGNOSIS — K76.0 FATTY (CHANGE OF) LIVER, NOT ELSEWHERE CLASSIFIED: ICD-10-CM

## 2021-10-20 PROCEDURE — 99214 OFFICE O/P EST MOD 30 MIN: CPT | Performed by: NURSE PRACTITIONER

## 2021-10-20 RX ORDER — HYDROCORTISONE 25 MG/G
OINTMENT TOPICAL
Qty: 30 | Refills: 6 | Status: ACTIVE
Start: 2021-10-20

## 2021-10-21 ENCOUNTER — OFFICE VISIT (OUTPATIENT)
Dept: CARDIOLOGY | Facility: CLINIC | Age: 46
End: 2021-10-21

## 2021-10-21 VITALS
HEART RATE: 73 BPM | SYSTOLIC BLOOD PRESSURE: 118 MMHG | BODY MASS INDEX: 40.43 KG/M2 | HEIGHT: 74 IN | DIASTOLIC BLOOD PRESSURE: 78 MMHG | WEIGHT: 315 LBS | OXYGEN SATURATION: 98 %

## 2021-10-21 DIAGNOSIS — Z79.4 TYPE 2 DIABETES MELLITUS WITH OTHER CIRCULATORY COMPLICATION, WITH LONG-TERM CURRENT USE OF INSULIN (HCC): ICD-10-CM

## 2021-10-21 DIAGNOSIS — E78.49 OTHER HYPERLIPIDEMIA: ICD-10-CM

## 2021-10-21 DIAGNOSIS — E11.59 TYPE 2 DIABETES MELLITUS WITH OTHER CIRCULATORY COMPLICATION, WITH LONG-TERM CURRENT USE OF INSULIN (HCC): ICD-10-CM

## 2021-10-21 DIAGNOSIS — I25.10 CORONARY ARTERIOSCLEROSIS IN NATIVE ARTERY: Primary | ICD-10-CM

## 2021-10-21 DIAGNOSIS — I10 ESSENTIAL HYPERTENSION: ICD-10-CM

## 2021-10-21 PROCEDURE — 99214 OFFICE O/P EST MOD 30 MIN: CPT | Performed by: INTERNAL MEDICINE

## 2021-10-21 PROCEDURE — 93000 ELECTROCARDIOGRAM COMPLETE: CPT | Performed by: INTERNAL MEDICINE

## 2021-10-21 RX ORDER — ERTUGLIFLOZIN 15 MG/1
15 TABLET, FILM COATED ORAL EVERY MORNING
COMMUNITY
Start: 2021-09-09 | End: 2022-09-29

## 2021-10-21 RX ORDER — FINASTERIDE 1 MG/1
1 TABLET, FILM COATED ORAL DAILY
COMMUNITY
Start: 2021-05-24 | End: 2022-05-24

## 2021-10-21 NOTE — PROGRESS NOTES
CARDIOLOGY    Catrina Horn MD    ENCOUNTER DATE:  10/21/2021    Jonel Garza / 46 y.o. / male        CHIEF COMPLAINT / REASON FOR OFFICE VISIT     Coronary Artery Disease (1 year follow up)      HISTORY OF PRESENT ILLNESS       HPI    Jonel Garza is a 46 y.o. 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  He has a history of diabetes, hypertension, and hyperlipidemia as well as a family history of coronary artery disease. He was having right-sided chest pain in the fall of 2014. He had an echocardiogram in 03/2014, which showed normal left ventricular function with an ejection fraction of 62% and no significant valvular heart disease. Because of his chest pain, he had a catheterization in 12/2014, which showed left main normal, left anterior descending artery 20% proximal lesion, circumflex nondominant with a 20% mid lesion, and right coronary artery dominant with a mid-99% lesion. His inferior wall was felt to be hypokinetic and ejection fraction of 45% to 50%. He had a Xience drug-eluting stent placed to the right coronary artery. He was having more chest pain in 05/2015 and underwent a stress echocardiogram 5/15, which showed mild left ventricular hypertrophy, normal left ventricular function with an ejection fraction of 60%, normal diastolic filling, and again no valvular heart disease. He exercised for 6 minutes and 59 seconds on the Adithya protocol and had no ST changes, and his stress echocardiogram images were normal. Because his chest pain continued, he went on and had a heart catheterization in 05/2015, which showed left main normal, left anterior descending artery 20% proximal lesion, and circumflex normal. Right coronary artery had a patent stent in the mid vessel and a 40% mid to distal lesion. Again, his inferior wall was felt to be hypokinetic with an  ejection fraction of 45%.       I saw him back in October 2017.  He was complaining of a right-sided chest pain similar to what he experienced before his stent.  It resolved with nitroglycerin.  I recommended a heart catheterization.  That showed a 40% proximal LAD lesion, normal circumflex, right coronary artery is a patent stent and up to 30% disease.  There is an RV branch with a 40% lesion.  No intervention was performed.     When I saw him in April 2019 he was complaining of some atypical chest pain.  We went over the options as far as PET stress test but he chose to avoid it to avoid radiation.     I saw him in October 2019.  At that time he was complaining of a right-sided heaviness.  Nuclear stress test in October 2019 showed normal perfusion of the myocardium with no ischemia.  Ejection fraction 64%.    He had a PET stress test in December 2020 showing an ejection fraction of 66% and no evidence of ischemia or infarction.    He is here today for routine follow-up.  His blood pressure has been controlled.  He denies any palpitations, shortness of breath or dizziness.  He is trying to exercise on a regular basis and has not noted any exertional symptoms.    REVIEW OF SYSTEMS     Review of Systems   Constitutional: Negative for chills, fever, weight gain and weight loss.   Cardiovascular: Positive for leg swelling.   Respiratory: Positive for snoring. Negative for cough and wheezing.    Hematologic/Lymphatic: Negative for bleeding problem. Does not bruise/bleed easily.   Skin: Negative for color change.   Musculoskeletal: Negative for falls, joint pain and myalgias.   Gastrointestinal: Positive for diarrhea. Negative for melena.   Genitourinary: Negative for hematuria.   Neurological: Negative for excessive daytime sleepiness.   Psychiatric/Behavioral: Negative for depression. The patient is not nervous/anxious.          VITAL SIGNS     Visit Vitals  /78 (BP Location: Left arm)   Pulse 73   Ht 188 cm  "(74\")   Wt (!) 163 kg (360 lb)   SpO2 98%   BMI 46.22 kg/m²         Wt Readings from Last 3 Encounters:   10/21/21 (!) 163 kg (360 lb)   05/24/21 (!) 165 kg (363 lb)   12/30/20 (!) 163 kg (360 lb)     Body mass index is 46.22 kg/m².      PHYSICAL EXAMINATION     Constitutional:       General: Not in acute distress.  Neck:      Vascular: No carotid bruit or JVD.   Pulmonary:      Effort: Pulmonary effort is normal.      Breath sounds: Normal breath sounds.   Cardiovascular:      Normal rate. Regular rhythm.      Murmurs: There is no murmur.   Psychiatric:         Mood and Affect: Mood and affect normal.           REVIEWED DATA       ECG 12 Lead    Date/Time: 10/21/2021 9:52 AM  Performed by: Catrina Horn MD  Authorized by: Catrina Horn MD   Comparison: compared with previous ECG from 11/10/2020  Similar to previous ECG  Rhythm: sinus rhythm  BPM: 74  Conduction: conduction normal  ST Segments: ST segments normal  T Waves: T waves normal    Clinical impression: normal ECG                  Lab Results   Component Value Date    GLUCOSE 130 (H) 10/31/2017    BUN 12 09/27/2021    CREATININE 0.7 09/27/2021    EGFRIFNONA 114 12/21/2017    EGFRIFAFRI 138 12/21/2017    BCR 17.0 09/27/2021    K 4.3 09/27/2021    CO2 27 09/27/2021    CALCIUM 9.5 09/27/2021    PROTENTOTREF 7.4 12/21/2017    ALBUMIN 3.9 09/27/2021    LABIL2 1.2 09/27/2021    AST 17 09/27/2021    ALT 30 09/27/2021       ASSESSMENT & PLAN      Diagnosis Plan   1. Coronary arteriosclerosis in native artery     2. Other hyperlipidemia     3. Essential hypertension     4. Type 2 diabetes mellitus with other circulatory complication, with long-term current use of insulin (HCC)         1. Coronary artery disease with a stent to the mid right coronary artery in 12/2014. He had a repeat catheterization in 05/2015 for recurrent chest pain, which showed his stent was patent and he only had nonobstructive lesions noted.  He had another heart catheterization in " November 2017 which continued to showed nonobstructive disease.    -Continue medical management.  -Encouraged regular exercise.  2. Hypertension.  Blood pressure is well controlled.  3. Hyperlipidemia.   4. Diabetes.  He is doing much better.  He has an insulin pump.  5. Obstructive sleep apnea. Compliant with BiPAP.   6. Hypothyroid.   7. Obesity.  He has lost some weight.  8. Vascular screening. He underwent vascular screening in 2015 which was normal.     He is stable.  No changes to medications.  Follow-up with me in 1 year unless he has a change in his symptoms sooner.      Orders Placed This Encounter   Procedures   • ECG 12 Lead     This order was created via procedure documentation     Order Specific Question:   Release to patient     Answer:   Immediate           MEDICATIONS         Discharge Medications          Accurate as of October 21, 2021  9:54 AM. If you have any questions, ask your nurse or doctor.            Continue These Medications      Instructions Start Date   Accu-Chek Guera Plus test strip  Generic drug: glucose blood   Use to test BG 5 times daily      Accu-Chek Softclix Lancets lancets   Use to test BG 5 times daily      acidophilus tablet tablet   1 tablet, Oral, As Needed      ammonium lactate 12 % cream  Commonly known as: AMLACTIN   Topical, As Needed      aspirin 81 MG tablet   81 mg, Oral, Daily      atorvastatin 40 MG tablet  Commonly known as: LIPITOR   40 mg, Oral, Daily      benzoyl peroxide-erythromycin 5-3 % gel  Commonly known as: BENZAMYCIN   Topical, As Needed      cetirizine 10 MG tablet  Commonly known as: zyrTEC   10 mg, Oral, As Needed      chlorthalidone 25 MG tablet  Commonly known as: HYGROTON   25 mg, Oral, Daily      Clickfine Pen Needles 31G X 6 MM misc  Generic drug: Insulin Pen Needle   No dose, route, or frequency recorded.      clobetasol 0.05 % cream  Commonly known as: TEMOVATE   APPLY TO AFFECTED AREA(S) TWO TIMES A DAY      Dapagliflozin Propanediol 10 MG  tablet   10 mg, Oral, Daily      ezetimibe 10 MG tablet  Commonly known as: ZETIA   10 mg, Oral, Daily      ferrous gluconate 324 MG tablet  Commonly known as: FERGON   324 mg, Oral, Daily PRN      finasteride 1 MG tablet  Commonly known as: PROPECIA   1 mg, Oral, Daily      fluticasone 50 MCG/ACT nasal spray  Commonly known as: FLONASE   1 spray, Nasal, As Needed      folic acid 1 MG tablet  Commonly known as: FOLVITE   1 mg, Oral, Daily      insulin lispro 100 UNIT/ML injection  Commonly known as: HumaLOG   Up to 150 units daily via pump      Admelog 100 UNIT/ML injection  Generic drug: insulin lispro   INJECT MAXIMUM DOSE  UNITS DAILY.      isosorbide mononitrate 30 MG 24 hr tablet  Commonly known as: IMDUR   TAKE ONE TABLET BY MOUTH TWICE A DAY      ketoconazole 2 % shampoo  Commonly known as: NIZORAL   As Needed      levothyroxine 125 MCG tablet  Commonly known as: SYNTHROID, LEVOTHROID   250 mcg, Oral, Daily      Metamucil [employer] Fiber 63 % powder  Generic drug: Psyllium   2 Times Daily      metFORMIN 1000 MG tablet  Commonly known as: GLUCOPHAGE   1,000 mg, Oral, Daily      metoprolol succinate  MG 24 hr tablet  Commonly known as: TOPROL-XL   100 mg, Oral, Daily      nitroglycerin 0.4 MG SL tablet  Commonly known as: NITROSTAT   DISSOLVE 1 TAB UNDER TONGUE FOR CHEST PAIN - IF PAIN REMAINS AFTER 5 MIN, CALL 911 AND REPEAT DOSE. MAX 3 TABS IN 15 MINUTES      nystatin 302162 UNIT/GM cream  Commonly known as: MYCOSTATIN   APPLY TO AFFECTED AREA(S) TWO TIMES A DAY PRN      Omega 3 1000 MG capsule   2 po bid with food      oxybutynin XL 10 MG 24 hr tablet  Commonly known as: DITROPAN-XL   10 mg, Oral, As Needed      prasugrel 10 MG tablet  Commonly known as: EFFIENT   TAKE ONE TABLET BY MOUTH DAILY      SALINE MIST 0.65 % nasal spray  Generic drug: sodium chloride   As Needed      sertraline 100 MG tablet  Commonly known as: ZOLOFT   100 mg, Oral, Daily      Steglatro 15 MG tablet  Generic drug:  Ertugliflozin L-PyroglutamicAc   15 mg, Oral, Every Morning      telmisartan-hydrochlorothiazide 80-25 MG per tablet  Commonly known as: MICARDIS HCT   1 tablet, Oral, Daily      Triple Antibiotic Plus 1 % ointment  Generic drug: Ldizr-Zhphl-Ekyxysp-Pramoxine   As Needed      Vascepa 1 g capsule capsule  Generic drug: icosapent ethyl   TAKE TWO CAPSULES BY MOUTH TWICE A DAY WITH MEALS               Catrina Horn MD  10/21/21  09:54 EDT    **Ranulfo Disclaimer:   Much of this encounter note is an electronic transcription/translation of spoken language to printed text. The electronic translation of spoken language may permit erroneous, or at times, nonsensical words or phrases to be inadvertently transcribed. Although I have reviewed the note for such errors, some may still exist.

## 2021-10-25 ENCOUNTER — TELEPHONE (OUTPATIENT)
Dept: CARDIOLOGY | Facility: CLINIC | Age: 46
End: 2021-10-25

## 2021-10-25 NOTE — TELEPHONE ENCOUNTER
Can you please amend your letter for clearance to include how long to hold the effient prior to his procedure.

## 2021-11-11 VITALS
SYSTOLIC BLOOD PRESSURE: 145 MMHG | SYSTOLIC BLOOD PRESSURE: 134 MMHG | HEART RATE: 80 BPM | DIASTOLIC BLOOD PRESSURE: 75 MMHG | OXYGEN SATURATION: 94 % | HEART RATE: 81 BPM | TEMPERATURE: 96.8 F | OXYGEN SATURATION: 98 % | SYSTOLIC BLOOD PRESSURE: 120 MMHG | SYSTOLIC BLOOD PRESSURE: 131 MMHG | RESPIRATION RATE: 10 BRPM | DIASTOLIC BLOOD PRESSURE: 74 MMHG | DIASTOLIC BLOOD PRESSURE: 72 MMHG | DIASTOLIC BLOOD PRESSURE: 67 MMHG | SYSTOLIC BLOOD PRESSURE: 117 MMHG | TEMPERATURE: 97 F | DIASTOLIC BLOOD PRESSURE: 71 MMHG | SYSTOLIC BLOOD PRESSURE: 143 MMHG | RESPIRATION RATE: 18 BRPM | SYSTOLIC BLOOD PRESSURE: 135 MMHG | HEIGHT: 74 IN | RESPIRATION RATE: 23 BRPM | DIASTOLIC BLOOD PRESSURE: 85 MMHG | RESPIRATION RATE: 26 BRPM | DIASTOLIC BLOOD PRESSURE: 82 MMHG | WEIGHT: 315 LBS | SYSTOLIC BLOOD PRESSURE: 119 MMHG | HEART RATE: 79 BPM | SYSTOLIC BLOOD PRESSURE: 140 MMHG | DIASTOLIC BLOOD PRESSURE: 57 MMHG | HEART RATE: 93 BPM | RESPIRATION RATE: 14 BRPM | DIASTOLIC BLOOD PRESSURE: 76 MMHG | RESPIRATION RATE: 20 BRPM | SYSTOLIC BLOOD PRESSURE: 141 MMHG | DIASTOLIC BLOOD PRESSURE: 65 MMHG | RESPIRATION RATE: 24 BRPM | HEART RATE: 83 BPM | HEART RATE: 77 BPM | OXYGEN SATURATION: 95 % | SYSTOLIC BLOOD PRESSURE: 128 MMHG | RESPIRATION RATE: 27 BRPM | SYSTOLIC BLOOD PRESSURE: 130 MMHG | SYSTOLIC BLOOD PRESSURE: 149 MMHG | RESPIRATION RATE: 22 BRPM | OXYGEN SATURATION: 96 %

## 2021-11-15 ENCOUNTER — OFFICE (OUTPATIENT)
Dept: URBAN - METROPOLITAN AREA PATHOLOGY 4 | Facility: PATHOLOGY | Age: 46
End: 2021-11-15
Payer: COMMERCIAL

## 2021-11-15 ENCOUNTER — AMBULATORY SURGICAL CENTER (OUTPATIENT)
Dept: URBAN - METROPOLITAN AREA SURGERY 17 | Facility: SURGERY | Age: 46
End: 2021-11-15
Payer: COMMERCIAL

## 2021-11-15 DIAGNOSIS — D12.4 BENIGN NEOPLASM OF DESCENDING COLON: ICD-10-CM

## 2021-11-15 DIAGNOSIS — D12.2 BENIGN NEOPLASM OF ASCENDING COLON: ICD-10-CM

## 2021-11-15 DIAGNOSIS — D12.0 BENIGN NEOPLASM OF CECUM: ICD-10-CM

## 2021-11-15 DIAGNOSIS — K64.0 FIRST DEGREE HEMORRHOIDS: ICD-10-CM

## 2021-11-15 DIAGNOSIS — K57.30 DIVERTICULOSIS OF LARGE INTESTINE WITHOUT PERFORATION OR ABS: ICD-10-CM

## 2021-11-15 DIAGNOSIS — K62.1 RECTAL POLYP: ICD-10-CM

## 2021-11-15 DIAGNOSIS — K62.5 HEMORRHAGE OF ANUS AND RECTUM: ICD-10-CM

## 2021-11-15 LAB
GI HISTOLOGY: A. UNSPECIFIED: (no result)
GI HISTOLOGY: B. UNSPECIFIED: (no result)
GI HISTOLOGY: C. UNSPECIFIED: (no result)
GI HISTOLOGY: D. UNSPECIFIED: (no result)
GI HISTOLOGY: PDF REPORT: (no result)

## 2021-11-15 PROCEDURE — 88305 TISSUE EXAM BY PATHOLOGIST: CPT | Performed by: INTERNAL MEDICINE

## 2021-11-15 PROCEDURE — 45385 COLONOSCOPY W/LESION REMOVAL: CPT | Performed by: INTERNAL MEDICINE

## 2021-11-15 NOTE — SERVICEHPINOTES
FELIPA RUIZ  is a  46  male   who presents today for a  Colonoscopy   for   the indications listed below. The updated Patient Profile was reviewed prior to the procedure, in conjunction with the Physical Exam, including medical conditions, surgical procedures, medications, allergies, family history and social history. See Physical Exam time stamp below for date and time of HPI completion.Pre-operatively, I reviewed the indication(s) for the procedure, the risks of the procedure [including but not limited to: unexpected bleeding possibly requiring hospitalization and/or unplanned repeat procedures, perforation possibly requiring surgical treatment, missed lesions and complications of sedation/MAC (also explained by anesthesia staff)]. I have evaluated the patient for risks associated with the planned anesthesia and the procedure to be performed and find the patient an acceptable candidate for IV sedation.Multiple opportunities were provided for any questions or concerns, and all questions were answered satisfactorily before any anesthesia was administered. We will proceed with the planned procedure.br

## 2021-11-22 RX ORDER — PRASUGREL 10 MG/1
TABLET, FILM COATED ORAL
Qty: 90 TABLET | Refills: 3 | Status: SHIPPED | OUTPATIENT
Start: 2021-11-22 | End: 2022-02-16 | Stop reason: SDUPTHER

## 2021-11-30 ENCOUNTER — TRANSCRIBE ORDERS (OUTPATIENT)
Dept: ADMINISTRATIVE | Facility: HOSPITAL | Age: 46
End: 2021-11-30

## 2021-11-30 DIAGNOSIS — D63.1 ANEMIA IN END-STAGE RENAL DISEASE (HCC): Primary | ICD-10-CM

## 2021-11-30 DIAGNOSIS — N18.6 ANEMIA IN END-STAGE RENAL DISEASE (HCC): Primary | ICD-10-CM

## 2021-12-08 ENCOUNTER — HOSPITAL ENCOUNTER (OUTPATIENT)
Dept: INFUSION THERAPY | Facility: HOSPITAL | Age: 46
Discharge: HOME | End: 2021-12-08
Admitting: INTERNAL MEDICINE

## 2021-12-08 VITALS
SYSTOLIC BLOOD PRESSURE: 139 MMHG | DIASTOLIC BLOOD PRESSURE: 64 MMHG | OXYGEN SATURATION: 96 % | HEART RATE: 77 BPM | RESPIRATION RATE: 20 BRPM | TEMPERATURE: 97.5 F

## 2021-12-08 DIAGNOSIS — E61.1 IRON DEFICIENCY: Primary | ICD-10-CM

## 2021-12-08 PROCEDURE — 96365 THER/PROPH/DIAG IV INF INIT: CPT

## 2021-12-08 PROCEDURE — 25010000002 FERRIC CARBOXYMALTOSE 750 MG/15ML SOLUTION 15 ML VIAL: Performed by: INTERNAL MEDICINE

## 2021-12-08 RX ADMIN — FERRIC CARBOXYMALTOSE INJECTION 750 MG: 50 INJECTION, SOLUTION INTRAVENOUS at 08:45

## 2021-12-08 NOTE — PROGRESS NOTES
Pt tolerated infusion well and monitored for 30 minutes without complication.  Pt dc'ed ambulatory.

## 2021-12-14 ENCOUNTER — APPOINTMENT (OUTPATIENT)
Dept: INFUSION THERAPY | Facility: HOSPITAL | Age: 46
End: 2021-12-14

## 2021-12-27 ENCOUNTER — HOSPITAL ENCOUNTER (OUTPATIENT)
Dept: INFUSION THERAPY | Facility: HOSPITAL | Age: 46
Discharge: HOME | End: 2021-12-27
Admitting: INTERNAL MEDICINE

## 2021-12-27 VITALS
DIASTOLIC BLOOD PRESSURE: 56 MMHG | SYSTOLIC BLOOD PRESSURE: 118 MMHG | OXYGEN SATURATION: 96 % | HEART RATE: 75 BPM | RESPIRATION RATE: 16 BRPM | TEMPERATURE: 97 F

## 2021-12-27 DIAGNOSIS — E61.1 IRON DEFICIENCY: Primary | ICD-10-CM

## 2021-12-27 PROCEDURE — 96365 THER/PROPH/DIAG IV INF INIT: CPT

## 2021-12-27 PROCEDURE — 25010000002 FERRIC CARBOXYMALTOSE 750 MG/15ML SOLUTION 15 ML VIAL: Performed by: INTERNAL MEDICINE

## 2021-12-27 RX ADMIN — FERRIC CARBOXYMALTOSE INJECTION 750 MG: 50 INJECTION, SOLUTION INTRAVENOUS at 12:16

## 2021-12-27 NOTE — PATIENT INSTRUCTIONS
Ferric carboxymaltose injection  What is this medicine?  FERRIC CARBOXYMALTOSE (ferr-ik car-box-ee-mol-toes) is an iron complex. Iron is used to make healthy red blood cells, which carry oxygen and nutrients throughout the body. This medicine is used to treat anemia in people with chronic kidney disease or people who cannot take iron by mouth.  This medicine may be used for other purposes; ask your health care provider or pharmacist if you have questions.  COMMON BRAND NAME(S): Injectafer  What should I tell my health care provider before I take this medicine?  They need to know if you have any of these conditions:  · high levels of iron in the blood  · liver disease  · an unusual or allergic reaction to iron, other medicines, foods, dyes, or preservatives  · pregnant or trying to get pregnant  · breast-feeding  How should I use this medicine?  This medicine is for infusion into a vein. It is given by a health care professional in a hospital or clinic setting.  Talk to your pediatrician regarding the use of this medicine in children. Special care may be needed.  Overdosage: If you think you have taken too much of this medicine contact a poison control center or emergency room at once.  NOTE: This medicine is only for you. Do not share this medicine with others.  What if I miss a dose?  It is important not to miss your dose. Call your doctor or health care professional if you are unable to keep an appointment.  What may interact with this medicine?  Do not take this medicine with any of the following medications:  · deferoxamine  · dimercaprol  · other iron products  This list may not describe all possible interactions. Give your health care provider a list of all the medicines, herbs, non-prescription drugs, or dietary supplements you use. Also tell them if you smoke, drink alcohol, or use illegal drugs. Some items may interact with your medicine.  What should I watch for while using this medicine?  Visit your  doctor or health care professional regularly. Tell your doctor if your symptoms do not start to get better or if they get worse. You may need blood work done while you are taking this medicine.  You may need to follow a special diet. Talk to your doctor. Foods that contain iron include: whole grains/cereals, dried fruits, beans, or peas, leafy green vegetables, and organ meats (liver, kidney).  What side effects may I notice from receiving this medicine?  Side effects that you should report to your doctor or health care professional as soon as possible:  · allergic reactions like skin rash, itching or hives, swelling of the face, lips, or tongue  · dizziness  · facial flushing  Side effects that usually do not require medical attention (report to your doctor or health care professional if they continue or are bothersome):  · changes in taste  · constipation  · headache  · nausea, vomiting  · pain, redness, or irritation at site where injected  This list may not describe all possible side effects. Call your doctor for medical advice about side effects. You may report side effects to FDA at 6-068-FDA-3225.  Where should I keep my medicine?  This drug is given in a hospital or clinic and will not be stored at home.  NOTE: This sheet is a summary. It may not cover all possible information. If you have questions about this medicine, talk to your doctor, pharmacist, or health care provider.  © 2021 [employer] (2018-02-01 09:40:29)

## 2022-01-04 RX ORDER — ISOSORBIDE MONONITRATE 30 MG/1
TABLET, EXTENDED RELEASE ORAL
Qty: 60 TABLET | Refills: 11 | Status: SHIPPED | OUTPATIENT
Start: 2022-01-04 | End: 2022-02-16 | Stop reason: SDUPTHER

## 2022-01-11 ENCOUNTER — OFFICE (OUTPATIENT)
Dept: URBAN - METROPOLITAN AREA CLINIC 75 | Facility: CLINIC | Age: 47
End: 2022-01-11
Payer: COMMERCIAL

## 2022-01-11 VITALS
RESPIRATION RATE: 16 BRPM | DIASTOLIC BLOOD PRESSURE: 62 MMHG | HEIGHT: 74 IN | HEART RATE: 78 BPM | WEIGHT: 315 LBS | SYSTOLIC BLOOD PRESSURE: 134 MMHG

## 2022-01-11 DIAGNOSIS — K90.0 CELIAC DISEASE: ICD-10-CM

## 2022-01-11 DIAGNOSIS — K76.0 FATTY (CHANGE OF) LIVER, NOT ELSEWHERE CLASSIFIED: ICD-10-CM

## 2022-01-11 PROCEDURE — 99214 OFFICE O/P EST MOD 30 MIN: CPT | Performed by: NURSE PRACTITIONER

## 2022-01-27 VITALS
RESPIRATION RATE: 18 BRPM | DIASTOLIC BLOOD PRESSURE: 67 MMHG | OXYGEN SATURATION: 99 % | RESPIRATION RATE: 16 BRPM | SYSTOLIC BLOOD PRESSURE: 124 MMHG | RESPIRATION RATE: 26 BRPM | HEART RATE: 75 BPM | DIASTOLIC BLOOD PRESSURE: 63 MMHG | RESPIRATION RATE: 19 BRPM | HEART RATE: 69 BPM | SYSTOLIC BLOOD PRESSURE: 109 MMHG | OXYGEN SATURATION: 98 % | SYSTOLIC BLOOD PRESSURE: 129 MMHG | SYSTOLIC BLOOD PRESSURE: 126 MMHG | WEIGHT: 315 LBS | HEART RATE: 74 BPM | DIASTOLIC BLOOD PRESSURE: 73 MMHG | OXYGEN SATURATION: 94 % | TEMPERATURE: 97 F | DIASTOLIC BLOOD PRESSURE: 76 MMHG | SYSTOLIC BLOOD PRESSURE: 127 MMHG | DIASTOLIC BLOOD PRESSURE: 68 MMHG | SYSTOLIC BLOOD PRESSURE: 119 MMHG | OXYGEN SATURATION: 93 % | RESPIRATION RATE: 27 BRPM | TEMPERATURE: 97.3 F | HEIGHT: 74 IN | HEART RATE: 71 BPM | HEART RATE: 72 BPM

## 2022-01-31 ENCOUNTER — AMBULATORY SURGICAL CENTER (OUTPATIENT)
Dept: URBAN - METROPOLITAN AREA SURGERY 17 | Facility: SURGERY | Age: 47
End: 2022-01-31
Payer: COMMERCIAL

## 2022-01-31 ENCOUNTER — OFFICE (OUTPATIENT)
Dept: URBAN - METROPOLITAN AREA PATHOLOGY 4 | Facility: PATHOLOGY | Age: 47
End: 2022-01-31
Payer: COMMERCIAL

## 2022-01-31 DIAGNOSIS — K31.89 OTHER DISEASES OF STOMACH AND DUODENUM: ICD-10-CM

## 2022-01-31 DIAGNOSIS — D37.8 NEOPLASM OF UNCERTAIN BEHAVIOR OF OTHER SPECIFIED DIGESTIVE: ICD-10-CM

## 2022-01-31 DIAGNOSIS — K20.80 OTHER ESOPHAGITIS WITHOUT BLEEDING: ICD-10-CM

## 2022-01-31 DIAGNOSIS — K90.0 CELIAC DISEASE: ICD-10-CM

## 2022-01-31 DIAGNOSIS — K29.80 DUODENITIS WITHOUT BLEEDING: ICD-10-CM

## 2022-01-31 LAB
GI HISTOLOGY: A. UNSPECIFIED: (no result)
GI HISTOLOGY: B. SELECT: (no result)
GI HISTOLOGY: C. SELECT: (no result)
GI HISTOLOGY: PDF REPORT: (no result)

## 2022-01-31 PROCEDURE — 88342 IMHCHEM/IMCYTCHM 1ST ANTB: CPT | Performed by: INTERNAL MEDICINE

## 2022-01-31 PROCEDURE — 43239 EGD BIOPSY SINGLE/MULTIPLE: CPT | Performed by: INTERNAL MEDICINE

## 2022-01-31 PROCEDURE — 88305 TISSUE EXAM BY PATHOLOGIST: CPT | Performed by: INTERNAL MEDICINE

## 2022-01-31 PROCEDURE — 88312 SPECIAL STAINS GROUP 1: CPT | Performed by: INTERNAL MEDICINE

## 2022-02-15 ENCOUNTER — PATIENT MESSAGE (OUTPATIENT)
Dept: CARDIOLOGY | Facility: CLINIC | Age: 47
End: 2022-02-15

## 2022-02-16 RX ORDER — NITROGLYCERIN 0.4 MG/1
TABLET SUBLINGUAL
Qty: 25 TABLET | Refills: 3 | Status: SHIPPED | OUTPATIENT
Start: 2022-02-16 | End: 2022-03-28 | Stop reason: SDUPTHER

## 2022-02-16 RX ORDER — PRASUGREL 10 MG/1
10 TABLET, FILM COATED ORAL DAILY
Qty: 90 TABLET | Refills: 3 | Status: SHIPPED | OUTPATIENT
Start: 2022-02-16 | End: 2022-10-27 | Stop reason: SDUPTHER

## 2022-02-16 RX ORDER — ISOSORBIDE MONONITRATE 30 MG/1
30 TABLET, EXTENDED RELEASE ORAL 2 TIMES DAILY
Qty: 60 TABLET | Refills: 11 | Status: SHIPPED | OUTPATIENT
Start: 2022-02-16 | End: 2022-03-04 | Stop reason: SDUPTHER

## 2022-02-16 NOTE — TELEPHONE ENCOUNTER
Rx Refill Note  Requested Prescriptions     Pending Prescriptions Disp Refills   • prasugrel (EFFIENT) 10 MG tablet 90 tablet 3     Sig: Take 1 tablet by mouth Daily.   • isosorbide mononitrate (IMDUR) 30 MG 24 hr tablet 60 tablet 11     Sig: Take 1 tablet by mouth 2 (Two) Times a Day.   • nitroglycerin (NITROSTAT) 0.4 MG SL tablet 25 tablet 3     Sig: DISSOLVE 1 TAB UNDER TONGUE FOR CHEST PAIN - IF PAIN REMAINS AFTER 5 MIN, CALL 911 AND REPEAT DOSE. MAX 3 TABS IN 15 MINUTES      Last office visit with prescribing clinician: 10/21/2021      Next office visit with prescribing clinician: 10/25/2022            Raisa Dasilva MA  02/16/22, 11:55 EST

## 2022-03-04 RX ORDER — ISOSORBIDE MONONITRATE 30 MG/1
30 TABLET, EXTENDED RELEASE ORAL 2 TIMES DAILY
Qty: 60 TABLET | Refills: 11 | Status: SHIPPED | OUTPATIENT
Start: 2022-03-04 | End: 2022-10-08 | Stop reason: HOSPADM

## 2022-03-04 NOTE — TELEPHONE ENCOUNTER
Rx Refill Note  Requested Prescriptions     Pending Prescriptions Disp Refills   • isosorbide mononitrate (IMDUR) 30 MG 24 hr tablet 60 tablet 11     Sig: Take 1 tablet by mouth 2 (Two) Times a Day. Patient takes one 30 Mg tablet by mouth Twice daily for a total of 60 Mg daily.      Last office visit with prescribing clinician: 10/21/2021      Next office visit with prescribing clinician: 10/25/2022            Raisa Dasilva MA  03/04/22, 09:37 EST

## 2022-03-28 RX ORDER — NITROGLYCERIN 0.4 MG/1
TABLET SUBLINGUAL
Qty: 25 TABLET | Refills: 3 | Status: SHIPPED | OUTPATIENT
Start: 2022-03-28 | End: 2022-03-28

## 2022-03-28 RX ORDER — NITROGLYCERIN 0.4 MG/1
TABLET SUBLINGUAL
Qty: 25 TABLET | Refills: 3 | Status: SHIPPED | OUTPATIENT
Start: 2022-03-28 | End: 2022-06-21 | Stop reason: SDUPTHER

## 2022-03-28 NOTE — TELEPHONE ENCOUNTER
Rx Refill Note  Requested Prescriptions      No prescriptions requested or ordered in this encounter      Last office visit with prescribing clinician: 10/21/2021      Next office visit with prescribing clinician: 10/25/2022            Raisa Dasilva MA  03/28/22, 08:22 EDT

## 2022-06-21 RX ORDER — NITROGLYCERIN 0.4 MG/1
TABLET SUBLINGUAL
Qty: 25 TABLET | Refills: 3 | Status: SHIPPED | OUTPATIENT
Start: 2022-06-21 | End: 2022-08-22 | Stop reason: SDUPTHER

## 2022-06-21 NOTE — TELEPHONE ENCOUNTER
Rx Refill Note  Requested Prescriptions     Pending Prescriptions Disp Refills   • nitroglycerin (NITROSTAT) 0.4 MG SL tablet 25 tablet 3     Sig: DISSOLVE 1 TAB UNDER TONGUE FOR CHEST PAIN - IF PAIN REMAINS AFTER 5 MIN, CALL 911 AND REPEAT DOSE. MAX 3 TABS IN 15 MINUTES      Last office visit with prescribing clinician: 10/21/2021      Next office visit with prescribing clinician: 10/25/2022            Raisa Dasilva MA  06/21/22, 12:30 EDT

## 2022-08-22 RX ORDER — NITROGLYCERIN 0.4 MG/1
TABLET SUBLINGUAL
Qty: 90 TABLET | Refills: 2 | Status: SHIPPED | OUTPATIENT
Start: 2022-08-22 | End: 2022-10-08 | Stop reason: HOSPADM

## 2022-08-22 NOTE — TELEPHONE ENCOUNTER
Rx Refill Note  Requested Prescriptions     Pending Prescriptions Disp Refills   • nitroglycerin (NITROSTAT) 0.4 MG SL tablet 25 tablet 3     Sig: DISSOLVE 1 TAB UNDER TONGUE FOR CHEST PAIN - IF PAIN REMAINS AFTER 5 MIN, CALL 911 AND REPEAT DOSE. MAX 3 TABS IN 15 MINUTES      Last office visit with prescribing clinician: 10/21/2021      Next office visit with prescribing clinician: 10/25/2022            Raisa Dasilva MA  08/22/22, 13:56 EDT

## 2022-09-29 ENCOUNTER — OFFICE VISIT (OUTPATIENT)
Dept: CARDIOLOGY | Facility: CLINIC | Age: 47
End: 2022-09-29

## 2022-09-29 VITALS
HEIGHT: 74 IN | WEIGHT: 315 LBS | OXYGEN SATURATION: 97 % | HEART RATE: 70 BPM | SYSTOLIC BLOOD PRESSURE: 116 MMHG | DIASTOLIC BLOOD PRESSURE: 74 MMHG | RESPIRATION RATE: 22 BRPM | BODY MASS INDEX: 40.43 KG/M2

## 2022-09-29 DIAGNOSIS — Z95.5 PRESENCE OF STENT IN CORONARY ARTERY: ICD-10-CM

## 2022-09-29 DIAGNOSIS — E78.49 OTHER HYPERLIPIDEMIA: ICD-10-CM

## 2022-09-29 DIAGNOSIS — I25.119 CORONARY ARTERY DISEASE INVOLVING NATIVE CORONARY ARTERY OF NATIVE HEART WITH ANGINA PECTORIS: ICD-10-CM

## 2022-09-29 DIAGNOSIS — Z79.4 TYPE 2 DIABETES MELLITUS WITH OTHER CIRCULATORY COMPLICATION, WITH LONG-TERM CURRENT USE OF INSULIN: ICD-10-CM

## 2022-09-29 DIAGNOSIS — Z98.890 S/P CARDIAC CATH: ICD-10-CM

## 2022-09-29 DIAGNOSIS — I10 ESSENTIAL HYPERTENSION: ICD-10-CM

## 2022-09-29 DIAGNOSIS — E11.59 TYPE 2 DIABETES MELLITUS WITH OTHER CIRCULATORY COMPLICATION, WITH LONG-TERM CURRENT USE OF INSULIN: ICD-10-CM

## 2022-09-29 DIAGNOSIS — I25.10 CORONARY ARTERIOSCLEROSIS IN NATIVE ARTERY: Primary | ICD-10-CM

## 2022-09-29 PROCEDURE — 99214 OFFICE O/P EST MOD 30 MIN: CPT | Performed by: INTERNAL MEDICINE

## 2022-09-29 PROCEDURE — 93000 ELECTROCARDIOGRAM COMPLETE: CPT | Performed by: INTERNAL MEDICINE

## 2022-09-29 NOTE — PROGRESS NOTES
CARDIOLOGY    Catrina Horn MD    ENCOUNTER DATE:  09/29/2022    Jonel Garza / 47 y.o. / male        CHIEF COMPLAINT / REASON FOR OFFICE VISIT     Heart Problem (Yearly follow up: Coronary arteriosclerosis in native artery )      HISTORY OF PRESENT ILLNESS       HPI    Jonel Garza is a 47 y.o. 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  He has a history of diabetes, hypertension, and hyperlipidemia as well as a family history of coronary artery disease. He was having right-sided chest pain in the fall of 2014. He had an echocardiogram in 03/2014, which showed normal left ventricular function with an ejection fraction of 62% and no significant valvular heart disease. Because of his chest pain, he had a catheterization in 12/2014, which showed left main normal, left anterior descending artery 20% proximal lesion, circumflex nondominant with a 20% mid lesion, and right coronary artery dominant with a mid-99% lesion. His inferior wall was felt to be hypokinetic and ejection fraction of 45% to 50%. He had a Xience drug-eluting stent placed to the right coronary artery. He was having more chest pain in 05/2015 and underwent a stress echocardiogram 5/15, which showed mild left ventricular hypertrophy, normal left ventricular function with an ejection fraction of 60%, normal diastolic filling, and again no valvular heart disease. He exercised for 6 minutes and 59 seconds on the Adithya protocol and had no ST changes, and his stress echocardiogram images were normal. Because his chest pain continued, he went on and had a heart catheterization in 05/2015, which showed left main normal, left anterior descending artery 20% proximal lesion, and circumflex normal. Right coronary artery had a patent stent in the mid vessel and a 40% mid to distal lesion. Again, his inferior wall  was felt to be hypokinetic with an ejection fraction of 45%.       I saw him back in October 2017.  He was complaining of a right-sided chest pain similar to what he experienced before his stent.  It resolved with nitroglycerin.  I recommended a heart catheterization.  That showed a 40% proximal LAD lesion, normal circumflex, right coronary artery is a patent stent and up to 30% disease.  There is an RV branch with a 40% lesion.  No intervention was performed.     When I saw him in April 2019 he was complaining of some atypical chest pain.  We went over the options as far as PET stress test but he chose to avoid it to avoid radiation.     I saw him in October 2019.  At that time he was complaining of a right-sided heaviness.  Nuclear stress test in October 2019 showed normal perfusion of the myocardium with no ischemia.  Ejection fraction 64%.     He had a PET stress test in December 2020 showing an ejection fraction of 66% and no evidence of ischemia or infarction.    He has been having chest discomfort with exertion over the last month.  He said he tried to mow his grass but he had to stop and rest because he got this discomfort in his chest with some radiation up into his throat area.  Goes away with rest.  Not responding to nitroglycerin.  It feels like before his last heart issue.    REVIEW OF SYSTEMS     Review of Systems   Constitutional: Negative for chills, fever, weight gain and weight loss.   Cardiovascular: Negative for leg swelling.   Respiratory: Negative for cough, snoring and wheezing.    Hematologic/Lymphatic: Negative for bleeding problem. Does not bruise/bleed easily.   Skin: Negative for color change.   Musculoskeletal: Negative for falls, joint pain and myalgias.   Gastrointestinal: Negative for melena.   Genitourinary: Negative for hematuria.   Neurological: Negative for excessive daytime sleepiness.   Psychiatric/Behavioral: Negative for depression. The patient is not nervous/anxious.   "        VITAL SIGNS     Visit Vitals  /74 (BP Location: Left arm, Patient Position: Sitting, Cuff Size: Large Adult)   Pulse 70   Resp 22   Ht 188 cm (74\")   Wt (!) 162 kg (358 lb)   SpO2 97%   BMI 45.96 kg/m²         Wt Readings from Last 3 Encounters:   09/29/22 (!) 162 kg (358 lb)   10/21/21 (!) 163 kg (360 lb)   05/24/21 (!) 165 kg (363 lb)     Body mass index is 45.96 kg/m².      PHYSICAL EXAMINATION     Constitutional:       General: Not in acute distress.  Neck:      Vascular: No carotid bruit or JVD.   Pulmonary:      Effort: Pulmonary effort is normal.      Breath sounds: Normal breath sounds.   Cardiovascular:      Normal rate. Regular rhythm.      Murmurs: There is no murmur.   Psychiatric:         Mood and Affect: Mood and affect normal.           REVIEWED DATA       ECG 12 Lead    Date/Time: 9/29/2022 12:47 PM  Performed by: Catrina Horn MD  Authorized by: Catrina Horn MD   Comparison: compared with previous ECG from 10/21/2021  Similar to previous ECG  Rhythm: sinus rhythm  BPM: 70  Conduction: non-specific intraventricular conduction delay    Clinical impression: abnormal EKG                  Lab Results   Component Value Date    GLUCOSE 348 (H) 12/21/2017    BUN 14 05/06/2022    CREATININE 0.63 (L) 05/06/2022    CREATININE 45.1 05/06/2022    EGFRIFNONA 114 12/21/2017    EGFRIFAFRI 138 12/21/2017    BCR 22.4 (H) 05/06/2022    K 4.6 05/06/2022    CO2 27 05/06/2022    CALCIUM 9.6 05/06/2022    PROTENTOTREF 7.4 12/21/2017    ALBUMIN 4.1 05/06/2022    LABIL2 1.4 05/06/2022    AST 22 05/06/2022    ALT 35 05/06/2022       ASSESSMENT & PLAN      Diagnosis Plan   1. Coronary arteriosclerosis in native artery     2. Essential hypertension     3. Other hyperlipidemia     4. Presence of stent in coronary artery     5. Type 2 diabetes mellitus with other circulatory complication, with long-term current use of insulin (HCC)     6. S/P cardiac cath     7. Coronary artery disease involving native " coronary artery of native heart with angina pectoris (HCC)  Case Request Cath Lab: Coronary angiography       1. Coronary artery disease with a stent to the mid right coronary artery in 12/2014. He had a repeat catheterization in 05/2015 for recurrent chest pain, which showed his stent was patent and he only had nonobstructive lesions noted.  He had another heart catheterization in November 2017 which continued to showed nonobstructive disease.  Stress test in 2020 was normal.  -He is now having symptoms consistent with stable angina.  His symptoms are concerning enough as is his history that I recommend proceeding with a heart catheterization.  2. Hypertension.  Blood pressure is well controlled.  3. Hyperlipidemia.   4. Diabetes.   5. Obstructive sleep apnea. Compliant with BiPAP.   6. Hypothyroid.   7. Obesity.  He has lost some weight.  8. Vascular screening. He underwent vascular screening in 2015 which was normal.     Determine follow-up based on the results of his heart catheterization.      Orders Placed This Encounter   Procedures   • ECG 12 Lead     This order was created via procedure documentation     Order Specific Question:   Release to patient     Answer:   Routine Release           MEDICATIONS         Discharge Medications          Accurate as of September 29, 2022 12:49 PM. If you have any questions, ask your nurse or doctor.            Continue These Medications      Instructions Start Date   Accu-Chek Guera Plus test strip  Generic drug: glucose blood   Use to test BG 5 times daily      Accu-Chek Softclix Lancets lancets   Use to test BG 5 times daily      acidophilus tablet tablet   1 tablet, Oral, As Needed      ammonium lactate 12 % cream  Commonly known as: AMLACTIN   Topical, As Needed      aspirin 81 MG tablet   81 mg, Oral, Daily      atorvastatin 40 MG tablet  Commonly known as: LIPITOR   40 mg, Oral, Daily      benzoyl peroxide-erythromycin 5-3 % gel  Commonly known as: BENZAMYCIN    Topical, As Needed      cetirizine 10 MG tablet  Commonly known as: zyrTEC   10 mg, Oral, As Needed      chlorthalidone 25 MG tablet  Commonly known as: HYGROTON   25 mg, Oral, Daily      Clickfine Pen Needles 31G X 6 MM misc  Generic drug: Insulin Pen Needle   No dose, route, or frequency recorded.      clobetasol 0.05 % cream  Commonly known as: TEMOVATE   APPLY TO AFFECTED AREA(S) TWO TIMES A DAY      dapagliflozin Propanediol 10 MG tablet   10 mg, Oral, Daily      ezetimibe 10 MG tablet  Commonly known as: ZETIA   10 mg, Oral, Daily      ferrous gluconate 324 MG tablet  Commonly known as: FERGON   324 mg, Oral, Daily PRN      fluticasone 50 MCG/ACT nasal spray  Commonly known as: FLONASE   1 spray, Nasal, As Needed      folic acid 1 MG tablet  Commonly known as: FOLVITE   1 mg, Oral, Daily      Injectafer 750 MG/15ML solution injection  Generic drug: ferric carboxymaltose   Infuse 15 mL (750 mg total) into a venous catheter per week for 2 days Please infuse at [facility]      insulin lispro 100 UNIT/ML injection  Commonly known as: HumaLOG   Up to 150 units daily via pump      Admelog 100 UNIT/ML injection  Generic drug: insulin lispro   INJECT MAXIMUM DOSE  UNITS DAILY.      isosorbide mononitrate 30 MG 24 hr tablet  Commonly known as: IMDUR   30 mg, Oral, 2 Times Daily, Patient takes one 30 Mg tablet by mouth Twice daily for a total of 60 Mg daily.      ketoconazole 2 % shampoo  Commonly known as: NIZORAL   As Needed      levothyroxine 125 MCG tablet  Commonly known as: SYNTHROID, LEVOTHROID   250 mcg, Oral, Daily      Metamucil [facility] Fiber 63 % powder  Generic drug: Psyllium   2 Times Daily      metFORMIN 1000 MG tablet  Commonly known as: GLUCOPHAGE   1,000 mg, Oral, Daily      metoprolol succinate  MG 24 hr tablet  Commonly known as: TOPROL-XL   100 mg, Oral, Daily      nitroglycerin 0.4 MG SL tablet  Commonly known as: NITROSTAT   DISSOLVE 1 TAB UNDER TONGUE FOR CHEST PAIN - IF PAIN  REMAINS AFTER 5 MIN, CALL 911 AND REPEAT DOSE. MAX 3 TABS IN 15 MINUTES      nystatin 360422 UNIT/GM cream  Commonly known as: MYCOSTATIN   APPLY TO AFFECTED AREA(S) TWO TIMES A DAY PRN      Omega 3 1000 MG capsule   2 po bid with food      oxybutynin XL 10 MG 24 hr tablet  Commonly known as: DITROPAN-XL   10 mg, Oral, As Needed      prasugrel 10 MG tablet  Commonly known as: EFFIENT   10 mg, Oral, Daily      SALINE MIST 0.65 % nasal spray  Generic drug: sodium chloride   As Needed      sertraline 100 MG tablet  Commonly known as: ZOLOFT   100 mg, Oral, Daily      Steglatro 15 MG tablet  Generic drug: Ertugliflozin L-PyroglutamicAc   15 mg, Oral, Every Morning      telmisartan-hydrochlorothiazide 80-25 MG per tablet  Commonly known as: MICARDIS HCT   1 tablet, Oral, Daily      Triple Antibiotic Plus 1 % ointment  Generic drug: Wibkl-Fqrda-Qpaiuad-Pramoxine   As Needed      Vascepa 1 g capsule capsule  Generic drug: icosapent ethyl   TAKE TWO CAPSULES BY MOUTH TWICE A DAY WITH MEALS               Catrina Horn MD  09/29/22  12:49 EDT    Part of this note may be an electronic transcription/translation of spoken language to printed text using the Dragon dictation system.

## 2022-09-30 ENCOUNTER — TELEPHONE (OUTPATIENT)
Dept: CARDIOLOGY | Facility: CLINIC | Age: 47
End: 2022-09-30

## 2022-09-30 NOTE — TELEPHONE ENCOUNTER
----- Message from Raisa Dasilva MA sent at 9/30/2022  8:47 AM EDT -----  Regarding: FW: Upcoming cath  Patient is requesting Dr Macdonald perform his upcoming Cath.       ----- Message -----  From: Jonel Garza  Sent: 9/29/2022   5:08 PM EDT  To: [facility]  Subject: Upcoming cath                                    Hi Jonel Kline here. Dr. Horn ordered a cath for me at today's visit. We were discussing providers in the practice that I wanted to undertake the procedure.  I did some further research and examination.  I would like to proceed with Miguel Macdonald if possible.  Would you kindly let Dr. Horn know and possibly illicit her thoughts on this? Thanks     Jonel

## 2022-10-03 ENCOUNTER — TRANSCRIBE ORDERS (OUTPATIENT)
Dept: CARDIOLOGY | Facility: CLINIC | Age: 47
End: 2022-10-03

## 2022-10-03 ENCOUNTER — TELEPHONE (OUTPATIENT)
Dept: CARDIOLOGY | Facility: CLINIC | Age: 47
End: 2022-10-03

## 2022-10-03 DIAGNOSIS — Z01.810 PRE-OPERATIVE CARDIOVASCULAR EXAMINATION: Primary | ICD-10-CM

## 2022-10-03 DIAGNOSIS — Z13.6 SCREENING FOR ISCHEMIC HEART DISEASE: ICD-10-CM

## 2022-10-03 RX ORDER — PREDNISONE 20 MG/1
TABLET ORAL
Qty: 6 TABLET | Refills: 0 | Status: SHIPPED | OUTPATIENT
Start: 2022-10-03 | End: 2022-10-08 | Stop reason: HOSPADM

## 2022-10-03 NOTE — TELEPHONE ENCOUNTER
Dr Horn,   Patient has a iodine/dye contrast allergy per Epic. Can you please send allergy prep to patient pharmacy?  trm

## 2022-10-06 ENCOUNTER — LAB (OUTPATIENT)
Dept: LAB | Facility: HOSPITAL | Age: 47
End: 2022-10-06

## 2022-10-06 DIAGNOSIS — Z13.6 SCREENING FOR ISCHEMIC HEART DISEASE: ICD-10-CM

## 2022-10-06 DIAGNOSIS — Z01.810 PRE-OPERATIVE CARDIOVASCULAR EXAMINATION: ICD-10-CM

## 2022-10-06 LAB
ANION GAP SERPL CALCULATED.3IONS-SCNC: 6.4 MMOL/L (ref 5–15)
BASOPHILS # BLD AUTO: 0.07 10*3/MM3 (ref 0–0.2)
BASOPHILS NFR BLD AUTO: 0.6 % (ref 0–1.5)
BUN SERPL-MCNC: 11 MG/DL (ref 6–20)
BUN/CREAT SERPL: 15.1 (ref 7–25)
CALCIUM SPEC-SCNC: 9.8 MG/DL (ref 8.6–10.5)
CHLORIDE SERPL-SCNC: 99 MMOL/L (ref 98–107)
CO2 SERPL-SCNC: 30.6 MMOL/L (ref 22–29)
CREAT SERPL-MCNC: 0.73 MG/DL (ref 0.76–1.27)
DEPRECATED RDW RBC AUTO: 40.2 FL (ref 37–54)
EGFRCR SERPLBLD CKD-EPI 2021: 112.2 ML/MIN/1.73
EOSINOPHIL # BLD AUTO: 0.42 10*3/MM3 (ref 0–0.4)
EOSINOPHIL NFR BLD AUTO: 3.6 % (ref 0.3–6.2)
ERYTHROCYTE [DISTWIDTH] IN BLOOD BY AUTOMATED COUNT: 13.9 % (ref 12.3–15.4)
GLUCOSE SERPL-MCNC: 137 MG/DL (ref 65–99)
HCT VFR BLD AUTO: 38.3 % (ref 37.5–51)
HGB BLD-MCNC: 13.1 G/DL (ref 13–17.7)
IMM GRANULOCYTES # BLD AUTO: 0.05 10*3/MM3 (ref 0–0.05)
IMM GRANULOCYTES NFR BLD AUTO: 0.4 % (ref 0–0.5)
LYMPHOCYTES # BLD AUTO: 3.55 10*3/MM3 (ref 0.7–3.1)
LYMPHOCYTES NFR BLD AUTO: 30.8 % (ref 19.6–45.3)
MCH RBC QN AUTO: 28 PG (ref 26.6–33)
MCHC RBC AUTO-ENTMCNC: 34.2 G/DL (ref 31.5–35.7)
MCV RBC AUTO: 81.8 FL (ref 79–97)
MONOCYTES # BLD AUTO: 1.11 10*3/MM3 (ref 0.1–0.9)
MONOCYTES NFR BLD AUTO: 9.6 % (ref 5–12)
NEUTROPHILS NFR BLD AUTO: 55 % (ref 42.7–76)
NEUTROPHILS NFR BLD AUTO: 6.33 10*3/MM3 (ref 1.7–7)
NRBC BLD AUTO-RTO: 0 /100 WBC (ref 0–0.2)
PLATELET # BLD AUTO: 349 10*3/MM3 (ref 140–450)
PMV BLD AUTO: 8.9 FL (ref 6–12)
POTASSIUM SERPL-SCNC: 3.9 MMOL/L (ref 3.5–5.2)
RBC # BLD AUTO: 4.68 10*6/MM3 (ref 4.14–5.8)
SODIUM SERPL-SCNC: 136 MMOL/L (ref 136–145)
WBC NRBC COR # BLD: 11.53 10*3/MM3 (ref 3.4–10.8)

## 2022-10-06 PROCEDURE — 36415 COLL VENOUS BLD VENIPUNCTURE: CPT

## 2022-10-06 PROCEDURE — 80048 BASIC METABOLIC PNL TOTAL CA: CPT

## 2022-10-06 PROCEDURE — 85025 COMPLETE CBC W/AUTO DIFF WBC: CPT

## 2022-10-07 ENCOUNTER — HOSPITAL ENCOUNTER (OUTPATIENT)
Facility: HOSPITAL | Age: 47
Discharge: HOME | End: 2022-10-08
Attending: INTERNAL MEDICINE | Admitting: INTERNAL MEDICINE

## 2022-10-07 DIAGNOSIS — Z95.5 S/P DRUG ELUTING CORONARY STENT PLACEMENT: Primary | ICD-10-CM

## 2022-10-07 DIAGNOSIS — Z79.4 TYPE 2 DIABETES MELLITUS WITH OTHER CIRCULATORY COMPLICATION, WITH LONG-TERM CURRENT USE OF INSULIN: ICD-10-CM

## 2022-10-07 DIAGNOSIS — E11.59 TYPE 2 DIABETES MELLITUS WITH OTHER CIRCULATORY COMPLICATION, WITH LONG-TERM CURRENT USE OF INSULIN: ICD-10-CM

## 2022-10-07 DIAGNOSIS — E78.49 OTHER HYPERLIPIDEMIA: ICD-10-CM

## 2022-10-07 DIAGNOSIS — E78.5 HYPERLIPEMIA: ICD-10-CM

## 2022-10-07 DIAGNOSIS — I25.119 CORONARY ARTERY DISEASE INVOLVING NATIVE CORONARY ARTERY OF NATIVE HEART WITH ANGINA PECTORIS: ICD-10-CM

## 2022-10-07 DIAGNOSIS — E03.9 ACQUIRED HYPOTHYROIDISM: ICD-10-CM

## 2022-10-07 LAB
ACT BLD: 271 SECONDS (ref 82–152)
ACT BLD: 271 SECONDS (ref 82–152)
ACT BLD: 277 SECONDS (ref 82–152)
GLUCOSE BLDC GLUCOMTR-MCNC: 149 MG/DL (ref 70–130)
GLUCOSE BLDC GLUCOMTR-MCNC: 206 MG/DL (ref 70–130)
GLUCOSE BLDC GLUCOMTR-MCNC: 222 MG/DL (ref 70–130)
GLUCOSE BLDC GLUCOMTR-MCNC: 95 MG/DL (ref 70–130)
QT INTERVAL: 401 MS
TROPONIN T SERPL-MCNC: <0.01 NG/ML (ref 0–0.03)

## 2022-10-07 PROCEDURE — 92978 ENDOLUMINL IVUS OCT C 1ST: CPT | Performed by: INTERNAL MEDICINE

## 2022-10-07 PROCEDURE — 25010000002 FENTANYL CITRATE (PF) 50 MCG/ML SOLUTION: Performed by: INTERNAL MEDICINE

## 2022-10-07 PROCEDURE — 93458 L HRT ARTERY/VENTRICLE ANGIO: CPT | Performed by: INTERNAL MEDICINE

## 2022-10-07 PROCEDURE — C1887 CATHETER, GUIDING: HCPCS | Performed by: INTERNAL MEDICINE

## 2022-10-07 PROCEDURE — C1725 CATH, TRANSLUMIN NON-LASER: HCPCS | Performed by: INTERNAL MEDICINE

## 2022-10-07 PROCEDURE — 63710000001 INSULIN LISPRO (HUMAN) PER 5 UNITS: Performed by: INTERNAL MEDICINE

## 2022-10-07 PROCEDURE — 25010000002 MIDAZOLAM PER 1 MG: Performed by: INTERNAL MEDICINE

## 2022-10-07 PROCEDURE — 0 IOPAMIDOL PER 1 ML: Performed by: INTERNAL MEDICINE

## 2022-10-07 PROCEDURE — 93005 ELECTROCARDIOGRAM TRACING: CPT | Performed by: INTERNAL MEDICINE

## 2022-10-07 PROCEDURE — 85347 COAGULATION TIME ACTIVATED: CPT

## 2022-10-07 PROCEDURE — C1769 GUIDE WIRE: HCPCS | Performed by: INTERNAL MEDICINE

## 2022-10-07 PROCEDURE — G0378 HOSPITAL OBSERVATION PER HR: HCPCS

## 2022-10-07 PROCEDURE — 82962 GLUCOSE BLOOD TEST: CPT

## 2022-10-07 PROCEDURE — C1874 STENT, COATED/COV W/DEL SYS: HCPCS | Performed by: INTERNAL MEDICINE

## 2022-10-07 PROCEDURE — 84484 ASSAY OF TROPONIN QUANT: CPT | Performed by: INTERNAL MEDICINE

## 2022-10-07 PROCEDURE — 25010000002 ONDANSETRON PER 1 MG: Performed by: INTERNAL MEDICINE

## 2022-10-07 PROCEDURE — C1894 INTRO/SHEATH, NON-LASER: HCPCS | Performed by: INTERNAL MEDICINE

## 2022-10-07 PROCEDURE — 92928 PRQ TCAT PLMT NTRAC ST 1 LES: CPT | Performed by: INTERNAL MEDICINE

## 2022-10-07 PROCEDURE — C9600 PERC DRUG-EL COR STENT SING: HCPCS | Performed by: INTERNAL MEDICINE

## 2022-10-07 PROCEDURE — C1753 CATH, INTRAVAS ULTRASOUND: HCPCS | Performed by: INTERNAL MEDICINE

## 2022-10-07 PROCEDURE — 99152 MOD SED SAME PHYS/QHP 5/>YRS: CPT | Performed by: INTERNAL MEDICINE

## 2022-10-07 PROCEDURE — 99153 MOD SED SAME PHYS/QHP EA: CPT | Performed by: INTERNAL MEDICINE

## 2022-10-07 PROCEDURE — 25010000002 HEPARIN (PORCINE) PER 1000 UNITS: Performed by: INTERNAL MEDICINE

## 2022-10-07 DEVICE — XIENCE SKYPOINT™ EVEROLIMUS ELUTING CORONARY STENT SYSTEM 3.50 MM X 18 MM / RAPID-EXCHANGE
Type: IMPLANTABLE DEVICE | Site: HEART | Status: FUNCTIONAL
Brand: XIENCE SKYPOINT™

## 2022-10-07 DEVICE — XIENCE SKYPOINT™ EVEROLIMUS ELUTING CORONARY STENT SYSTEM 3.50 MM X 33 MM / RAPID-EXCHANGE
Type: IMPLANTABLE DEVICE | Site: HEART | Status: FUNCTIONAL
Brand: XIENCE SKYPOINT™

## 2022-10-07 DEVICE — XIENCE SKYPOINT™ EVEROLIMUS ELUTING CORONARY STENT SYSTEM 4.50 MM X 28 MM / RAPID-EXCHANGE
Type: IMPLANTABLE DEVICE | Site: HEART | Status: FUNCTIONAL
Brand: XIENCE SKYPOINT™

## 2022-10-07 RX ORDER — LEVOTHYROXINE SODIUM 0.12 MG/1
250 TABLET ORAL
Status: DISCONTINUED | OUTPATIENT
Start: 2022-10-08 | End: 2022-10-08 | Stop reason: HOSPADM

## 2022-10-07 RX ORDER — SODIUM CHLORIDE 0.9 % (FLUSH) 0.9 %
10 SYRINGE (ML) INJECTION AS NEEDED
Status: DISCONTINUED | OUTPATIENT
Start: 2022-10-07 | End: 2022-10-07 | Stop reason: HOSPADM

## 2022-10-07 RX ORDER — ASPIRIN 81 MG/1
81 TABLET ORAL DAILY
Status: DISCONTINUED | OUTPATIENT
Start: 2022-10-07 | End: 2022-10-08 | Stop reason: HOSPADM

## 2022-10-07 RX ORDER — PRASUGREL 10 MG/1
10 TABLET, FILM COATED ORAL DAILY
Status: DISCONTINUED | OUTPATIENT
Start: 2022-10-07 | End: 2022-10-07

## 2022-10-07 RX ORDER — SODIUM CHLORIDE 9 MG/ML
75 INJECTION, SOLUTION INTRAVENOUS CONTINUOUS
Status: DISCONTINUED | OUTPATIENT
Start: 2022-10-07 | End: 2022-10-08 | Stop reason: HOSPADM

## 2022-10-07 RX ORDER — PRASUGREL 10 MG/1
TABLET, FILM COATED ORAL AS NEEDED
Status: DISCONTINUED | OUTPATIENT
Start: 2022-10-07 | End: 2022-10-07 | Stop reason: HOSPADM

## 2022-10-07 RX ORDER — METOPROLOL SUCCINATE 50 MG/1
100 TABLET, EXTENDED RELEASE ORAL DAILY
Status: DISCONTINUED | OUTPATIENT
Start: 2022-10-07 | End: 2022-10-08 | Stop reason: HOSPADM

## 2022-10-07 RX ORDER — FENTANYL CITRATE 50 UG/ML
INJECTION, SOLUTION INTRAMUSCULAR; INTRAVENOUS AS NEEDED
Status: DISCONTINUED | OUTPATIENT
Start: 2022-10-07 | End: 2022-10-07 | Stop reason: HOSPADM

## 2022-10-07 RX ORDER — CHLORTHALIDONE 25 MG/1
25 TABLET ORAL DAILY
Status: DISCONTINUED | OUTPATIENT
Start: 2022-10-07 | End: 2022-10-08 | Stop reason: HOSPADM

## 2022-10-07 RX ORDER — PRASUGREL 10 MG/1
10 TABLET, FILM COATED ORAL DAILY
Status: DISCONTINUED | OUTPATIENT
Start: 2022-10-08 | End: 2022-10-08 | Stop reason: HOSPADM

## 2022-10-07 RX ORDER — MIDAZOLAM HYDROCHLORIDE 1 MG/ML
INJECTION INTRAMUSCULAR; INTRAVENOUS AS NEEDED
Status: DISCONTINUED | OUTPATIENT
Start: 2022-10-07 | End: 2022-10-07 | Stop reason: HOSPADM

## 2022-10-07 RX ORDER — HEPARIN SODIUM 1000 [USP'U]/ML
INJECTION, SOLUTION INTRAVENOUS; SUBCUTANEOUS AS NEEDED
Status: DISCONTINUED | OUTPATIENT
Start: 2022-10-07 | End: 2022-10-07 | Stop reason: HOSPADM

## 2022-10-07 RX ORDER — NALOXONE HCL 0.4 MG/ML
0.4 VIAL (ML) INJECTION
Status: DISCONTINUED | OUTPATIENT
Start: 2022-10-07 | End: 2022-10-08 | Stop reason: HOSPADM

## 2022-10-07 RX ORDER — NITROGLYCERIN 0.4 MG/1
0.4 TABLET SUBLINGUAL
Status: DISCONTINUED | OUTPATIENT
Start: 2022-10-07 | End: 2022-10-08 | Stop reason: HOSPADM

## 2022-10-07 RX ORDER — DEXTROSE MONOHYDRATE 25 G/50ML
25 INJECTION, SOLUTION INTRAVENOUS
Status: DISCONTINUED | OUTPATIENT
Start: 2022-10-07 | End: 2022-10-08 | Stop reason: HOSPADM

## 2022-10-07 RX ORDER — MORPHINE SULFATE 2 MG/ML
2 INJECTION, SOLUTION INTRAMUSCULAR; INTRAVENOUS
Status: DISCONTINUED | OUTPATIENT
Start: 2022-10-07 | End: 2022-10-08 | Stop reason: HOSPADM

## 2022-10-07 RX ORDER — NICOTINE POLACRILEX 4 MG
15 LOZENGE BUCCAL
Status: DISCONTINUED | OUTPATIENT
Start: 2022-10-07 | End: 2022-10-08 | Stop reason: HOSPADM

## 2022-10-07 RX ORDER — TEMAZEPAM 15 MG/1
15 CAPSULE ORAL NIGHTLY PRN
Status: DISCONTINUED | OUTPATIENT
Start: 2022-10-07 | End: 2022-10-08 | Stop reason: HOSPADM

## 2022-10-07 RX ORDER — HYDROCODONE BITARTRATE AND ACETAMINOPHEN 5; 325 MG/1; MG/1
1 TABLET ORAL EVERY 4 HOURS PRN
Status: DISCONTINUED | OUTPATIENT
Start: 2022-10-07 | End: 2022-10-08 | Stop reason: HOSPADM

## 2022-10-07 RX ORDER — ATORVASTATIN CALCIUM 20 MG/1
40 TABLET, FILM COATED ORAL DAILY
Status: DISCONTINUED | OUTPATIENT
Start: 2022-10-07 | End: 2022-10-08 | Stop reason: HOSPADM

## 2022-10-07 RX ORDER — ISOSORBIDE MONONITRATE 30 MG/1
30 TABLET, EXTENDED RELEASE ORAL
Status: DISCONTINUED | OUTPATIENT
Start: 2022-10-07 | End: 2022-10-08 | Stop reason: HOSPADM

## 2022-10-07 RX ORDER — ASPIRIN 81 MG/1
81 TABLET ORAL DAILY
Status: DISCONTINUED | OUTPATIENT
Start: 2022-10-07 | End: 2022-10-07

## 2022-10-07 RX ORDER — SERTRALINE HYDROCHLORIDE 100 MG/1
100 TABLET, FILM COATED ORAL DAILY
Status: DISCONTINUED | OUTPATIENT
Start: 2022-10-07 | End: 2022-10-08 | Stop reason: HOSPADM

## 2022-10-07 RX ORDER — ONDANSETRON 4 MG/1
4 TABLET, FILM COATED ORAL EVERY 6 HOURS PRN
Status: DISCONTINUED | OUTPATIENT
Start: 2022-10-07 | End: 2022-10-08 | Stop reason: HOSPADM

## 2022-10-07 RX ORDER — ICOSAPENT ETHYL 1000 MG/1
2 CAPSULE ORAL 2 TIMES DAILY WITH MEALS
Status: DISCONTINUED | OUTPATIENT
Start: 2022-10-08 | End: 2022-10-08 | Stop reason: HOSPADM

## 2022-10-07 RX ORDER — FOLIC ACID 1 MG/1
1 TABLET ORAL DAILY
Status: DISCONTINUED | OUTPATIENT
Start: 2022-10-07 | End: 2022-10-08 | Stop reason: HOSPADM

## 2022-10-07 RX ORDER — LIDOCAINE HYDROCHLORIDE 20 MG/ML
INJECTION, SOLUTION INFILTRATION; PERINEURAL AS NEEDED
Status: DISCONTINUED | OUTPATIENT
Start: 2022-10-07 | End: 2022-10-07 | Stop reason: HOSPADM

## 2022-10-07 RX ORDER — SODIUM CHLORIDE 9 MG/ML
50 INJECTION, SOLUTION INTRAVENOUS CONTINUOUS
Status: ACTIVE | OUTPATIENT
Start: 2022-10-07 | End: 2022-10-08

## 2022-10-07 RX ORDER — ONDANSETRON 2 MG/ML
4 INJECTION INTRAMUSCULAR; INTRAVENOUS EVERY 6 HOURS PRN
Status: DISCONTINUED | OUTPATIENT
Start: 2022-10-07 | End: 2022-10-08 | Stop reason: HOSPADM

## 2022-10-07 RX ORDER — ONDANSETRON 2 MG/ML
INJECTION INTRAMUSCULAR; INTRAVENOUS AS NEEDED
Status: DISCONTINUED | OUTPATIENT
Start: 2022-10-07 | End: 2022-10-07 | Stop reason: HOSPADM

## 2022-10-07 RX ORDER — ACETAMINOPHEN 325 MG/1
650 TABLET ORAL EVERY 4 HOURS PRN
Status: DISCONTINUED | OUTPATIENT
Start: 2022-10-07 | End: 2022-10-08 | Stop reason: HOSPADM

## 2022-10-07 RX ORDER — SODIUM CHLORIDE 0.9 % (FLUSH) 0.9 %
10 SYRINGE (ML) INJECTION EVERY 12 HOURS SCHEDULED
Status: DISCONTINUED | OUTPATIENT
Start: 2022-10-07 | End: 2022-10-07 | Stop reason: HOSPADM

## 2022-10-07 RX ORDER — ASPIRIN 325 MG
TABLET ORAL AS NEEDED
Status: DISCONTINUED | OUTPATIENT
Start: 2022-10-07 | End: 2022-10-07 | Stop reason: HOSPADM

## 2022-10-07 RX ORDER — ICOSAPENT ETHYL 1000 MG/1
2 CAPSULE ORAL 2 TIMES DAILY WITH MEALS
Status: DISCONTINUED | OUTPATIENT
Start: 2022-10-07 | End: 2022-10-07

## 2022-10-07 RX ORDER — INSULIN LISPRO 100 [IU]/ML
0-9 INJECTION, SOLUTION INTRAVENOUS; SUBCUTANEOUS
Status: DISCONTINUED | OUTPATIENT
Start: 2022-10-07 | End: 2022-10-08 | Stop reason: HOSPADM

## 2022-10-07 RX ADMIN — SERTRALINE 100 MG: 100 TABLET, FILM COATED ORAL at 17:18

## 2022-10-07 RX ADMIN — INSULIN GLARGINE-YFGN 20 UNITS: 100 INJECTION, SOLUTION SUBCUTANEOUS at 21:21

## 2022-10-07 RX ADMIN — INSULIN LISPRO 4 UNITS: 100 INJECTION, SOLUTION INTRAVENOUS; SUBCUTANEOUS at 18:16

## 2022-10-07 RX ADMIN — METOPROLOL SUCCINATE 100 MG: 50 TABLET, FILM COATED, EXTENDED RELEASE ORAL at 17:18

## 2022-10-07 RX ADMIN — SODIUM CHLORIDE 75 ML/HR: 9 INJECTION, SOLUTION INTRAVENOUS at 10:57

## 2022-10-07 RX ADMIN — Medication 1 MG: at 17:18

## 2022-10-07 RX ADMIN — LOSARTAN POTASSIUM: 50 TABLET, FILM COATED ORAL at 17:19

## 2022-10-07 RX ADMIN — ISOSORBIDE MONONITRATE 30 MG: 30 TABLET, EXTENDED RELEASE ORAL at 17:18

## 2022-10-07 RX ADMIN — SODIUM CHLORIDE 50 ML/HR: 9 INJECTION, SOLUTION INTRAVENOUS at 15:51

## 2022-10-07 RX ADMIN — ATORVASTATIN CALCIUM 40 MG: 20 TABLET, FILM COATED ORAL at 21:21

## 2022-10-07 RX ADMIN — CHLORTHALIDONE 25 MG: 25 TABLET ORAL at 21:21

## 2022-10-07 NOTE — CONSULTS
Met with patient, discussed benefits of cardiac rehab. Provided phase II information along with the contact information for cardiac rehab here at [facility]. Will call after discharge to schedule.

## 2022-10-07 NOTE — CONSULTS
CONSULT NOTE    INTERNAL MEDICINE   [facility]       Patient Identification:  Name: Jonel Garza  Age: 47 y.o.  Sex: male  :  1975  MRN: [id number]             Date of Consultation:  10/07/22          Primary Care Physician: Adriano Temple PA                               Requesting Physician: dr medina  Reason for Consultation:diabetes    Chief Complaint:  47 year old gentleman admitted with chest pain; he had a cardiac cath earlier today and a stent was placed into the lad and rca; we are asked to see him to help with management of his diabetes; he uses an insulin pump at home;     History of Present Illness:   As above      Past Medical History:  Past Medical History:   Diagnosis Date   • CAD in native artery    • Celiac disease    • Coronary arteriosclerosis    • Diabetes mellitus (HCC)     insulin pump   • Hyperlipidemia    • Hypertension    • Hypothyroidism    • Obesity    • FARZAD on CPAP    • Precordial pain    • Type 2 diabetes mellitus (HCC)      Past Surgical History:  Past Surgical History:   Procedure Laterality Date   • CARDIAC CATHETERIZATION N/A 2017    Procedure: Coronary angiography;  Surgeon: Zack Fernando MD;  Location: [facility] CATH INVASIVE LOCATION;  Service:    • CARDIAC CATHETERIZATION N/A 2017    Procedure: Left Heart Cath;  Surgeon: Zack Fernando MD;  Location: [facility] CATH INVASIVE LOCATION;  Service:    • CARDIAC CATHETERIZATION N/A 2017    Procedure: Left ventriculography;  Surgeon: Zack Fernando MD;  [facility] CATH [facility];  Service:    • CARDIAC CATHETERIZATION     • COLONOSCOPY N/A 2014   • CORONARY ANGIOPLASTY WITH STENT PLACEMENT      Drug-eluting stent placed to the mid right coronary artery in 2014   • TONSILLECTOMY     • UPPER GASTROINTESTINAL ENDOSCOPY N/A 2019      Home Meds:  Medications Prior to Admission   Medication Sig Dispense Refill Last Dose   • ACCU-CHEK ANN MARIE PLUS test strip Use  to test BG 5 times daily 500 each 0 10/7/2022 at Unknown time   • ACCU-CHEK SOFTCLIX LANCETS lancets Use to test BG 5 times daily 500 each 0 10/7/2022 at Unknown time   • ammonium lactate (AMLACTIN) 12 % cream Apply  topically As Needed.   Past Month at Unknown time   • benzoyl peroxide-erythromycin (BENZAMYCIN) 5-3 % gel Apply  topically As Needed.   Past Month at Unknown time   • cetirizine (ZyrTEC) 10 MG tablet Take 10 mg by mouth As Needed.   Past Week at Unknown time   • CLICKFINE PEN NEEDLES 31G X 6 MM misc   11 Past Month at Unknown time   • clobetasol (TEMOVATE) 0.05 % cream APPLY TO AFFECTED AREA(S) TWO TIMES A DAY   Past Week at Unknown time   • Dapagliflozin Propanediol 10 MG tablet Take 10 mg by mouth Daily.   Past Month at Unknown time   • diphenhydrAMINE (BENADRYL) 50 MG tablet Take one tablet the evening before cardiac cath and one tablet the morning of cath 2 tablet 0 10/7/2022 at Unknown time   • ferrous gluconate (FERGON) 324 MG tablet Take 324 mg by mouth Daily As Needed.   Past Month at Unknown time   • fluticasone (FLONASE) 50 MCG/ACT nasal spray 1 spray into the nostril(s) as directed by provider As Needed.   Past Month at Unknown time   • folic acid (FOLVITE) 1 MG tablet Take 1 mg by mouth Daily.   10/6/2022 at Unknown time   • insulin lispro (Admelog) 100 UNIT/ML injection INJECT MAXIMUM DOSE  UNITS DAILY.   10/6/2022 at Unknown time   • insulin lispro (HUMALOG) 100 UNIT/ML injection Up to 150 units daily via pump 50 mL 2 10/6/2022 at Unknown time   • isosorbide mononitrate (IMDUR) 30 MG 24 hr tablet Take 1 tablet by mouth 2 (Two) Times a Day. Patient takes one 30 Mg tablet by mouth Twice daily for a total of 60 Mg daily. 60 tablet 11 10/6/2022 at Unknown time   • ketoconazole (NIZORAL) 2 % shampoo As Needed.   Past Week at Unknown time   • levothyroxine (SYNTHROID, LEVOTHROID) 125 MCG tablet Take 250 mcg by mouth Daily.   10/7/2022 at Unknown time   • METAMUCIL MULTIHEALTH FIBER 63 %  powder 2 (Two) Times a Day.   Past Week at Unknown time   • metoprolol succinate XL (TOPROL-XL) 100 MG 24 hr tablet Take 100 mg by mouth daily.   10/6/2022 at Unknown time   • nitroglycerin (NITROSTAT) 0.4 MG SL tablet DISSOLVE 1 TAB UNDER TONGUE FOR CHEST PAIN - IF PAIN REMAINS AFTER 5 MIN, CALL 911 AND REPEAT DOSE. MAX 3 TABS IN 15 MINUTES 90 tablet 2 Past Week at Unknown time   • nystatin (MYCOSTATIN) 864886 UNIT/GM cream APPLY TO AFFECTED AREA(S) TWO TIMES A DAY PRN   Past Month at Unknown time   • predniSONE (DELTASONE) 20 MG tablet Take 3 tablets the evening before cath and 3 tablets the morning of cath 6 tablet 0 10/7/2022 at Unknown time   • sertraline (ZOLOFT) 100 MG tablet Take 100 mg by mouth daily.   10/6/2022 at Unknown time   • aspirin 81 MG tablet Take 81 mg by mouth daily.   10/2/2022   • atorvastatin (LIPITOR) 40 MG tablet Take 40 mg by mouth Daily.   10/5/2022   • chlorthalidone (HYGROTON) 25 MG tablet Take 25 mg by mouth Daily.   10/2/2022   • ezetimibe (ZETIA) 10 MG tablet Take 10 mg by mouth daily.   10/5/2022   • ferric carboxymaltose (Injectafer) 750 MG/15ML solution injection Infuse 15 mL (750 mg total) into a venous catheter per week for 2 days Please infuse at [facility] 15 mL 1 More than a month at Unknown time   • metFORMIN (GLUCOPHAGE) 1000 MG tablet Take 1 tablet by mouth Daily.   10/2/2022   • Omega 3 1000 MG capsule 2 po bid with food 120 each 5 10/2/2022   • oxybutynin XL (DITROPAN-XL) 10 MG 24 hr tablet Take 10 mg by mouth As Needed.   More than a month at Unknown time   • prasugrel (EFFIENT) 10 MG tablet Take 1 tablet by mouth Daily. 90 tablet 3 10/2/2022   • SALINE MIST 0.65 % nasal spray As Needed.   More than a month at Unknown time   • telmisartan-hydrochlorothiazide (MICARDIS HCT) 80-25 MG per tablet Take 1 tablet by mouth Daily.   10/5/2022   • TRIPLE ANTIBIOTIC PLUS 1 % ointment As Needed.   More than a month at Unknown time   • VASCEPA 1 g capsule capsule TAKE TWO  CAPSULES BY MOUTH TWICE A DAY WITH MEALS 120 capsule 4 10/2/2022     Current Meds:     Current Facility-Administered Medications:   •  acetaminophen (TYLENOL) tablet 650 mg, 650 mg, Oral, Q4H PRN, Maxi Macdonald MD  •  aspirin EC tablet 81 mg, 81 mg, Oral, Daily, Maxi Macdonald MD  •  atorvastatin (LIPITOR) tablet 40 mg, 40 mg, Oral, Daily, Maxi Macdonald MD  •  chlorthalidone (HYGROTON) tablet 25 mg, 25 mg, Oral, Daily, Maxi Macdonald MD  •  dextrose (D50W) (25 g/50 mL) IV injection 25 g, 25 g, Intravenous, Q15 Min PRN, Pippa Baptiste MD  •  dextrose (GLUTOSE) oral gel 15 g, 15 g, Oral, Q15 Min PRN, Pippa Baptiste MD  •  empagliflozin (JARDIANCE) tablet 25 mg, 25 mg, Oral, Daily, Maxi Macdonald MD  •  folic acid (FOLVITE) tablet 1 mg, 1 mg, Oral, Daily, Maxi Macdonald MD, 1 mg at 10/07/22 1718  •  glucagon (human recombinant) (GLUCAGEN DIAGNOSTIC) injection 1 mg, 1 mg, Intramuscular, Q15 Min PRN, Pippa Baptiste MD  •  HYDROcodone-acetaminophen (NORCO) 5-325 MG per tablet 1 tablet, 1 tablet, Oral, Q4H PRN, Maxi Macdonald MD  •  icosapent ethyl (VASCEPA) capsule 2 g, 2 g, Oral, BID With Meals, Maxi Macdonald MD  •  insulin glargine (LANTUS, SEMGLEE) injection 20 Units, 20 Units, Subcutaneous, Nightly, Pippa Baptiste MD  •  insulin lispro (ADMELOG) injection 0-9 Units, 0-9 Units, Subcutaneous, TID With Meals, Pippa Baptiste MD  •  isosorbide mononitrate (IMDUR) 24 hr tablet 30 mg, 30 mg, Oral, BID - Nitrates, Maxi Macdonald MD, 30 mg at 10/07/22 1718  •  [START ON 10/8/2022] levothyroxine (SYNTHROID, LEVOTHROID) tablet 250 mcg, 250 mcg, Oral, Q AM, Maxi Macdonald MD  •  losartan (COZAAR) 100 mg, hydroCHLOROthiazide (HYDRODIURIL) 25 mg, , Oral, Daily, Maxi Macdonald MD, Given at 10/07/22 1719  •  metoprolol succinate XL (TOPROL-XL) 24 hr tablet 100 mg, 100 mg, Oral, Daily, Maxi Macdonald  MD GEORGE, 100 mg at 10/07/22 1718  •  morphine injection 2 mg, 2 mg, Intravenous, Q2H PRN **AND** naloxone (NARCAN) injection 0.4 mg, 0.4 mg, Intravenous, Q5 Min PRN, Maxi Macdonald MD  •  nitroglycerin (NITROSTAT) SL tablet 0.4 mg, 0.4 mg, Sublingual, Q5 Min PRN, Maxi Macdonald MD  •  ondansetron (ZOFRAN) tablet 4 mg, 4 mg, Oral, Q6H PRN **OR** ondansetron (ZOFRAN) injection 4 mg, 4 mg, Intravenous, Q6H PRN, Maxi Macdonald MD  •  [START ON 10/8/2022] prasugrel (EFFIENT) tablet 10 mg, 10 mg, Oral, Daily, Maxi Macdonald MD  •  sertraline (ZOLOFT) tablet 100 mg, 100 mg, Oral, Daily, Maxi Macdonald MD, 100 mg at 10/07/22 1718  •  sodium chloride 0.9 % infusion, 75 mL/hr, Intravenous, Continuous, Maxi Macdonald MD, Stopped at 10/07/22 1400  •  sodium chloride 0.9 % infusion, 50 mL/hr, Intravenous, Continuous, Maxi Macdonald MD, Last Rate: 50 mL/hr at 10/07/22 1551, 50 mL/hr at 10/07/22 1551  •  temazepam (RESTORIL) capsule 15 mg, 15 mg, Oral, Nightly PRN, Maxi Macdonald MD  Allergies:  Allergies   Allergen Reactions   • Ace Inhibitors Anaphylaxis   • Iodinated Diagnostic Agents Nausea And Vomiting   • Polyethylene Glycol Anaphylaxis, Nausea And Vomiting and Swelling   • Quinapril Anaphylaxis and Swelling     angioedema   • Dobutamine Nausea And Vomiting   • Rosuvastatin Calcium Other (See Comments)     rhabdomyolisis   • Latex Rash     Social History:   Social History     Socioeconomic History   • Marital status: Single   Tobacco Use   • Smoking status: Never Smoker   • Smokeless tobacco: Never Used   Vaping Use   • Vaping Use: Never used   Substance and Sexual Activity   • Alcohol use: No     Comment: occasional caffeine use   • Drug use: No   • Sexual activity: Defer     Family History:  Family History   Problem Relation Age of Onset   • Hypertension Mother    • Heart disease Mother    • Stroke Mother         CVA   • Pancreatic cancer Mother    • Heart  "disease Father    • Hypertension Sister    • Hypertension Brother    • Diabetes type II Brother           Review of Systems  See history of present illness and past medical history.  Patient denies headache, dizziness, syncope, falls, trauma, change in vision, change in hearing, change in taste, changes in weight, changes in appetite, focal weakness, numbness, or paresthesia.  Patient denies chest pain, palpitations, dyspnea, orthopnea, PND, cough, sinus pressure, rhinorrhea, epistaxis, hemoptysis, nausea, vomiting,hematemesis, diarrhea, constipation or hematchezia.  Denies cold or heat intolerance, polydipsia, polyuria, polyphagia. Denies hematuria, pyuria, dysuria, hesitancy, frequency or urgency. Denies consumption of raw and under cooked meats foods or change in water source.  Denies fever, chills, sweats, night sweats.  Denies missing any routine medications. Remainder of ROS is negative.      Vitals:   /70 (BP Location: Right arm, Patient Position: Lying)   Pulse 73   Temp 97.8 °F (36.6 °C) (Oral)   Resp 19   Ht 188 cm (74\")   Wt (!) 159 kg (350 lb)   SpO2 96%   BMI 44.94 kg/m²   I/O:     Intake/Output Summary (Last 24 hours) at 10/7/2022 1735  Last data filed at 10/7/2022 1500  Gross per 24 hour   Intake 480 ml   Output --   Net 480 ml     Exam:  General Appearance:    Alert, cooperative, no distress, appears stated age   Head:    Normocephalic, without obvious abnormality, atraumatic   Eyes:    PERRL, conjunctivae/corneas clear, EOM's intact, both eyes   Ears:    Normal external ear canals, both ears   Nose:   Nares normal, septum midline, mucosa normal, no drainage    or sinus tenderness   Throat:   Lips, tongue, gums normal; oral mucosa pink and moist   Neck:   Supple, symmetrical, trachea midline, no adenopathy;     thyroid:  no enlargement/tenderness/nodules; no carotid    bruit or JVD   Back:     Symmetric, no curvature, ROM normal, no CVA tenderness   Lungs:     Clear to auscultation " bilaterally, respirations unlabored   Chest Wall:    No tenderness or deformity    Heart:    Regular rate and rhythm, S1 and S2 normal, no murmur, rub   or gallop   Abdomen:     Soft, nontender, bowel sounds active all four quadrants,     no masses, no hepatomegaly, no splenomegaly   Extremities:   Extremities normal, atraumatic, no cyanosis or edema   Pulses:   Pulses palpable in all extremities; symmetric all extremities   Skin:   Skin color normal, Skin is warm and dry,  no rashes or palpable lesions   Neurologic:   CNII-XII intact, motor strength grossly intact, sensation grossly intact to light touch, no focal deficits noted       Data Review:  Labs in chart were reviewed.  WBC   Date Value Ref Range Status   10/06/2022 11.53 (H) 3.40 - 10.80 10*3/mm3 Final     Hemoglobin   Date Value Ref Range Status   10/06/2022 13.1 13.0 - 17.7 g/dL Final     Hematocrit   Date Value Ref Range Status   10/06/2022 38.3 37.5 - 51.0 % Final     Platelets   Date Value Ref Range Status   10/06/2022 349 140 - 450 10*3/mm3 Final     Sodium   Date Value Ref Range Status   10/06/2022 136 136 - 145 mmol/L Final     Potassium   Date Value Ref Range Status   10/06/2022 3.9 3.5 - 5.2 mmol/L Final     Chloride   Date Value Ref Range Status   10/06/2022 99 98 - 107 mmol/L Final     CO2   Date Value Ref Range Status   10/06/2022 30.6 (H) 22.0 - 29.0 mmol/L Final     BUN   Date Value Ref Range Status   10/06/2022 11 6 - 20 mg/dL Final     Creatinine   Date Value Ref Range Status   10/06/2022 0.73 (L) 0.76 - 1.27 mg/dL Final     Glucose   Date Value Ref Range Status   10/06/2022 137 (H) 65 - 99 mg/dL Final     Calcium   Date Value Ref Range Status   10/06/2022 9.8 8.6 - 10.5 mg/dL Final     No results found for: AST, ALT, ALKPHOS  No results found for: APTT, INR            Imaging Results (Last 7 Days)     ** No results found for the last 168 hours. **        Past Medical History:   Diagnosis Date   • CAD in native artery    • Celiac disease     • Coronary arteriosclerosis    • Diabetes mellitus (HCC)     insulin pump   • Hyperlipidemia    • Hypertension    • Hypothyroidism    • Obesity    • FARZAD on CPAP    • Precordial pain    • Type 2 diabetes mellitus (HCC)        Assessment:  Active Hospital Problems    Diagnosis  POA   • **Coronary arteriosclerosis in native artery [I25.10]  Unknown   • Coronary artery disease involving native coronary artery of native heart with angina pectoris (HCC) [I25.119]  Yes      Resolved Hospital Problems   No resolved problems to display.   diabetes  Hypertension  Cad  Sleep apnea  obesity    Plan:  Sliding scale insulin and lantus at bedtime  He will resume his insulin pump after discharge  jardiance is being continued  accu checks  D.w patient  Notes and labs reviewed  Thanks for involving us in his care    Pippa Baptiste MD   10/7/2022  17:35 EDT

## 2022-10-07 NOTE — PLAN OF CARE
Goal Outcome Evaluation:           Progress: improving  Outcome Evaluation: VSS. S/P cath and stents -- radial site remains clean/dry/soft. SR on tele. Meds administered per MAR. Will continue with current plan of care & adjust as needed.

## 2022-10-08 VITALS
DIASTOLIC BLOOD PRESSURE: 76 MMHG | BODY MASS INDEX: 40.43 KG/M2 | RESPIRATION RATE: 19 BRPM | HEIGHT: 74 IN | WEIGHT: 315 LBS | HEART RATE: 69 BPM | SYSTOLIC BLOOD PRESSURE: 152 MMHG | TEMPERATURE: 98.1 F | OXYGEN SATURATION: 96 %

## 2022-10-08 LAB
ANION GAP SERPL CALCULATED.3IONS-SCNC: 6.4 MMOL/L (ref 5–15)
BUN SERPL-MCNC: 10 MG/DL (ref 6–20)
BUN/CREAT SERPL: 13.3 (ref 7–25)
CALCIUM SPEC-SCNC: 9.3 MG/DL (ref 8.6–10.5)
CHLORIDE SERPL-SCNC: 98 MMOL/L (ref 98–107)
CHOLEST SERPL-MCNC: 149 MG/DL (ref 0–200)
CO2 SERPL-SCNC: 29.6 MMOL/L (ref 22–29)
CREAT SERPL-MCNC: 0.75 MG/DL (ref 0.76–1.27)
DEPRECATED RDW RBC AUTO: 39.7 FL (ref 37–54)
EGFRCR SERPLBLD CKD-EPI 2021: 111.3 ML/MIN/1.73
ERYTHROCYTE [DISTWIDTH] IN BLOOD BY AUTOMATED COUNT: 13.7 % (ref 12.3–15.4)
GLUCOSE BLDC GLUCOMTR-MCNC: 159 MG/DL (ref 70–130)
GLUCOSE BLDC GLUCOMTR-MCNC: 199 MG/DL (ref 70–130)
GLUCOSE SERPL-MCNC: 184 MG/DL (ref 65–99)
HBA1C MFR BLD: 8.1 % (ref 4.8–5.6)
HCT VFR BLD AUTO: 38.3 % (ref 37.5–51)
HDLC SERPL-MCNC: 38 MG/DL (ref 40–60)
HGB BLD-MCNC: 13.1 G/DL (ref 13–17.7)
LDLC SERPL CALC-MCNC: 90 MG/DL (ref 0–100)
LDLC/HDLC SERPL: 2.32 {RATIO}
MCH RBC QN AUTO: 28.1 PG (ref 26.6–33)
MCHC RBC AUTO-ENTMCNC: 34.2 G/DL (ref 31.5–35.7)
MCV RBC AUTO: 82 FL (ref 79–97)
PLATELET # BLD AUTO: 326 10*3/MM3 (ref 140–450)
PMV BLD AUTO: 9 FL (ref 6–12)
POTASSIUM SERPL-SCNC: 4 MMOL/L (ref 3.5–5.2)
QT INTERVAL: 397 MS
RBC # BLD AUTO: 4.67 10*6/MM3 (ref 4.14–5.8)
SODIUM SERPL-SCNC: 134 MMOL/L (ref 136–145)
TRIGL SERPL-MCNC: 115 MG/DL (ref 0–150)
VLDLC SERPL-MCNC: 21 MG/DL (ref 5–40)
WBC NRBC COR # BLD: 9.57 10*3/MM3 (ref 3.4–10.8)

## 2022-10-08 PROCEDURE — G0378 HOSPITAL OBSERVATION PER HR: HCPCS

## 2022-10-08 PROCEDURE — 93005 ELECTROCARDIOGRAM TRACING: CPT | Performed by: INTERNAL MEDICINE

## 2022-10-08 PROCEDURE — 93010 ELECTROCARDIOGRAM REPORT: CPT | Performed by: INTERNAL MEDICINE

## 2022-10-08 PROCEDURE — 83036 HEMOGLOBIN GLYCOSYLATED A1C: CPT | Performed by: INTERNAL MEDICINE

## 2022-10-08 PROCEDURE — 80061 LIPID PANEL: CPT | Performed by: INTERNAL MEDICINE

## 2022-10-08 PROCEDURE — 80048 BASIC METABOLIC PNL TOTAL CA: CPT | Performed by: INTERNAL MEDICINE

## 2022-10-08 PROCEDURE — 85027 COMPLETE CBC AUTOMATED: CPT | Performed by: INTERNAL MEDICINE

## 2022-10-08 PROCEDURE — 82962 GLUCOSE BLOOD TEST: CPT

## 2022-10-08 PROCEDURE — 99217 PR OBSERVATION CARE DISCHARGE MANAGEMENT: CPT | Performed by: NURSE PRACTITIONER

## 2022-10-08 RX ORDER — TELMISARTAN 80 MG/1
160 TABLET ORAL DAILY
Qty: 60 TABLET | Refills: 11 | Status: SHIPPED | OUTPATIENT
Start: 2022-10-08 | End: 2022-10-27 | Stop reason: SDUPTHER

## 2022-10-08 RX ORDER — LEVOTHYROXINE SODIUM 0.12 MG/1
250 TABLET ORAL
Qty: 180 TABLET | Refills: 3 | Status: SHIPPED | OUTPATIENT
Start: 2022-10-09 | End: 2022-10-08 | Stop reason: HOSPADM

## 2022-10-08 RX ORDER — LEVOTHYROXINE SODIUM 125 MCG
250 TABLET ORAL DAILY
Qty: 60 TABLET | Refills: 5 | Status: SHIPPED | OUTPATIENT
Start: 2022-10-08 | End: 2022-12-09

## 2022-10-08 RX ORDER — NITROGLYCERIN 0.4 MG/1
0.4 TABLET SUBLINGUAL
Qty: 25 TABLET | Refills: 12 | Status: SHIPPED | OUTPATIENT
Start: 2022-10-08

## 2022-10-08 RX ORDER — ISOSORBIDE MONONITRATE 30 MG/1
30 TABLET, EXTENDED RELEASE ORAL
Qty: 180 TABLET | Refills: 3 | Status: SHIPPED | OUTPATIENT
Start: 2022-10-08 | End: 2022-10-27 | Stop reason: SDUPTHER

## 2022-10-08 RX ORDER — ATORVASTATIN CALCIUM 40 MG/1
80 TABLET, FILM COATED ORAL NIGHTLY
Qty: 30 TABLET | Refills: 11 | Status: CANCELLED | OUTPATIENT
Start: 2022-10-08

## 2022-10-08 RX ORDER — ATORVASTATIN CALCIUM 40 MG/1
80 TABLET, FILM COATED ORAL DAILY
Qty: 60 TABLET | Refills: 11 | Status: SHIPPED | OUTPATIENT
Start: 2022-10-08 | End: 2022-10-27 | Stop reason: SDUPTHER

## 2022-10-08 RX ADMIN — PRASUGREL 10 MG: 10 TABLET, FILM COATED ORAL at 08:32

## 2022-10-08 RX ADMIN — EMPAGLIFLOZIN 25 MG: 25 TABLET, FILM COATED ORAL at 08:32

## 2022-10-08 RX ADMIN — Medication 1 MG: at 08:33

## 2022-10-08 RX ADMIN — METOPROLOL SUCCINATE 100 MG: 50 TABLET, FILM COATED, EXTENDED RELEASE ORAL at 08:33

## 2022-10-08 RX ADMIN — ATORVASTATIN CALCIUM 40 MG: 20 TABLET, FILM COATED ORAL at 08:31

## 2022-10-08 RX ADMIN — LOSARTAN POTASSIUM: 50 TABLET, FILM COATED ORAL at 08:32

## 2022-10-08 RX ADMIN — SERTRALINE 100 MG: 100 TABLET, FILM COATED ORAL at 08:33

## 2022-10-08 RX ADMIN — CHLORTHALIDONE 25 MG: 25 TABLET ORAL at 08:33

## 2022-10-08 RX ADMIN — ISOSORBIDE MONONITRATE 30 MG: 30 TABLET, EXTENDED RELEASE ORAL at 08:33

## 2022-10-08 RX ADMIN — ASPIRIN 81 MG: 81 TABLET, COATED ORAL at 08:32

## 2022-10-08 RX ADMIN — LEVOTHYROXINE SODIUM 250 MCG: 0.12 TABLET ORAL at 06:58

## 2022-10-08 NOTE — DISCHARGE SUMMARY
Jonel Garza  [id number]    Date of Admit: 10/7/2022  Date of Discharge:  10/8/2022    Discharge Diagnosis:  Active Hospital Problems    Diagnosis  POA   • **Coronary arteriosclerosis in native artery [I25.10]  Unknown   • Coronary artery disease involving native coronary artery of native heart with angina pectoris (HCC) [I25.119]  Yes      Resolved Hospital Problems   No resolved problems to display.       Hospital Course:   Mr. Garza has history of type II diabetes, hypertension, and hyperlipidemia. He has history of stent to RCA in December 2014 and inferior wall hypokinesis. He was seen by Dr. Horn on 9/29/2022 for chest pain with pain radiation to his throat will mowing the lawn; it was relieved with rest and similar to previous anginal episodes. He was admitted for cardiac catheterization by Dr. Macdonald on 10/7/2022. He had a 95% stenosis of mid-LAD treated with overlapping 4.5 x 28 mm and 3.5 x 33 mm Xience SkyPoint drug-eluting stents, and ulcerated 95% mid vessel RCA stenosis at the distal edge of previously placed stent treated with 3.5 x 18 mm Xience SkyPoint drug-eluting stent.     He feels good but tired this morning and ready for discharge. No chest pain and his breathing is comfortable. EKG, labs, and vital signs are stable. He previously had rhabdomyolysis on rosuvastatin. He has never been on high-dose lipitor. He has been on Effient long-term and I spoke with Dr. Horn. Because he is young, she would prefer for him to stay on Effient and work on diet and exercise changes. Jardiance has been added this admission.     He is stable for discharge home. We will try to increase lipitor to 80 mg nightly and will check labs at hospital follow-up. I asked him to call the office if he has any myalgias. He also states that Dr. Gagnon with renal has recently increase his telmisartan to 160 mg and stopped HCTZ and chlorthalidone. I will discharge him home on this dose and further changes can be made at follow  up with needed.    Procedures Performed  Procedure(s):  Coronary angiography  Left Heart Cath  Left ventriculography  Stent LIBERTAD coronary  Percutaneous Coronary Intervention  Optical Coherent Tomography       Consults     Date and Time Order Name Status Description    10/7/2022  2:04 PM Inpatient Hospitalist Consult Completed           Discharge Medications     Your medication list      CONTINUE taking these medications      Instructions Last Dose Given Next Dose Due   Accu-Chek Softclix Lancets lancets      Use to test BG 5 times daily       Clickfine Pen Needles 31G X 6 MM misc  Generic drug: Insulin Pen Needle              ASK your doctor about these medications      Instructions Last Dose Given Next Dose Due   Accu-Chek Guera Plus test strip  Generic drug: glucose blood      Use to test BG 5 times daily       ammonium lactate 12 % cream  Commonly known as: AMLACTIN      Apply  topically As Needed.       aspirin 81 MG tablet      Take 81 mg by mouth daily.       atorvastatin 40 MG tablet  Commonly known as: LIPITOR      Take 40 mg by mouth Daily.       benzoyl peroxide-erythromycin 5-3 % gel  Commonly known as: BENZAMYCIN      Apply  topically As Needed.       cetirizine 10 MG tablet  Commonly known as: zyrTEC      Take 10 mg by mouth As Needed.       chlorthalidone 25 MG tablet  Commonly known as: HYGROTON      Take 25 mg by mouth Daily.       clobetasol 0.05 % cream  Commonly known as: TEMOVATE      APPLY TO AFFECTED AREA(S) TWO TIMES A DAY       dapagliflozin Propanediol 10 MG tablet      Take 10 mg by mouth Daily.       diphenhydrAMINE 50 MG tablet  Commonly known as: BENADRYL      Take one tablet the evening before cardiac cath and one tablet the morning of cath       ezetimibe 10 MG tablet  Commonly known as: ZETIA      Take 10 mg by mouth daily.       ferrous gluconate 324 MG tablet  Commonly known as: FERGON      Take 324 mg by mouth Daily As Needed.       fluticasone 50 MCG/ACT nasal spray  Commonly known  as: FLONASE      1 spray into the nostril(s) as directed by provider As Needed.       folic acid 1 MG tablet  Commonly known as: FOLVITE      Take 1 mg by mouth Daily.       Injectafer 750 MG/15ML solution injection  Generic drug: ferric carboxymaltose      Infuse 15 mL (750 mg total) into a venous catheter per week for 2 days Please infuse at [facility]       insulin lispro 100 UNIT/ML injection  Commonly known as: HumaLOG      Up to 150 units daily via pump       Admelog 100 UNIT/ML injection  Generic drug: insulin lispro      INJECT MAXIMUM DOSE  UNITS DAILY.       isosorbide mononitrate 30 MG 24 hr tablet  Commonly known as: IMDUR      Take 1 tablet by mouth 2 (Two) Times a Day. Patient takes one 30 Mg tablet by mouth Twice daily for a total of 60 Mg daily.       ketoconazole 2 % shampoo  Commonly known as: NIZORAL      As Needed.       levothyroxine 125 MCG tablet  Commonly known as: SYNTHROID, LEVOTHROID      Take 250 mcg by mouth Daily.       Metamucil [employer] Fiber 63 % powder  Generic drug: Psyllium      2 (Two) Times a Day.       metFORMIN 1000 MG tablet  Commonly known as: GLUCOPHAGE      Take 1 tablet by mouth Daily.       metoprolol succinate  MG 24 hr tablet  Commonly known as: TOPROL-XL      Take 100 mg by mouth daily.       nitroglycerin 0.4 MG SL tablet  Commonly known as: NITROSTAT      DISSOLVE 1 TAB UNDER TONGUE FOR CHEST PAIN - IF PAIN REMAINS AFTER 5 MIN, CALL 911 AND REPEAT DOSE. MAX 3 TABS IN 15 MINUTES       nystatin [id number] UNIT/GM cream  Commonly known as: MYCOSTATIN      APPLY TO AFFECTED AREA(S) TWO TIMES A DAY PRN       Omega 3 1000 MG capsule      2 po bid with food       oxybutynin XL 10 MG 24 hr tablet  Commonly known as: DITROPAN-XL      Take 10 mg by mouth As Needed.       prasugrel 10 MG tablet  Commonly known as: EFFIENT      Take 1 tablet by mouth Daily.       predniSONE 20 MG tablet  Commonly known as: DELTASONE      Take 3 tablets the evening before cath  and 3 tablets the morning of cath       SALINE MIST 0.65 % nasal spray  Generic drug: sodium chloride      As Needed.       sertraline 100 MG tablet  Commonly known as: ZOLOFT      Take 100 mg by mouth daily.       telmisartan-hydrochlorothiazide 80-25 MG per tablet  Commonly known as: MICARDIS HCT      Take 1 tablet by mouth Daily.       Triple Antibiotic Plus 1 % ointment  Generic drug: Sfblh-Cgkzh-Abmbiws-Pramoxine      As Needed.       Vascepa 1 g capsule capsule  Generic drug: icosapent ethyl      TAKE TWO CAPSULES BY MOUTH TWICE A DAY WITH MEALS              Discharge Diet: Heart Healthy    Activity at Discharge:  No heavy lifting. Rest right arm on pillow to reduce pain and edema. If there is bleeding for the catheterization site, apply pressure and call 911.   Walk x 10 minutes 3 times daily    Discharge disposition: home    Condition on Discharge: stable    Follow-up Appointments  Future Appointments   Date Time Provider Department Center   10/3/2023  9:40 AM Catrina Horn MD [facility]     Additional Instructions for the Follow-ups that You Need to Schedule     Ambulatory Referral to Cardiac Rehab   As directed      Ambulatory Referral to Cardiac Rehab   As directed            Test Results Pending at Discharge       Darleen Jovel, DA  10/08/22  11:38 EDT

## 2022-10-08 NOTE — DISCHARGE INSTRUCTIONS
Try increasing lipitor to 80 mg nightly; ok to try taking alternating 40 mg and 80 mg every other night. Please call the office if  you have any myalgias.   Please call office with Jardiance will be expensive.  Continue Effient per Dr. Catrina Horn.   Stop by hospital and get labs drawn prior to hospital follow up appointment. Our office will call to schedule.

## 2022-10-08 NOTE — PLAN OF CARE
Goal Outcome Evaluation:  Plan of Care Reviewed With: patient        Progress: no change  Outcome Evaluation: Pt s/p cardiac catheterization with LIBERTAD, right radial site to gauze & tegaderm, C/D/I. Pt ambulates to bathroom independently, ambulated around unit with family member. Remains NSR, blood pressure stable, IVF infusing at ordered rate, denies chest pain, even with exertion. Currently resting in chair, will continue to monitor   Other Normal Other

## 2022-10-14 ENCOUNTER — PATIENT MESSAGE (OUTPATIENT)
Dept: CARDIOLOGY | Facility: CLINIC | Age: 47
End: 2022-10-14

## 2022-10-14 NOTE — TELEPHONE ENCOUNTER
From: Jonel Garza  To: Catrina Horn MD  Sent: 10/14/2022 11:10 AM EDT  Subject: Jorge Kline/Dr. Horn,   I need a small note indicating last week's stents and need for cardiac rehab. 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 It can be sent as attachment to my email [email or web address] or as attachment to my chart. Thank you

## 2022-10-27 ENCOUNTER — OFFICE VISIT (OUTPATIENT)
Dept: CARDIOLOGY | Facility: CLINIC | Age: 47
End: 2022-10-27

## 2022-10-27 VITALS
RESPIRATION RATE: 18 BRPM | WEIGHT: 315 LBS | SYSTOLIC BLOOD PRESSURE: 136 MMHG | HEIGHT: 74 IN | HEART RATE: 67 BPM | OXYGEN SATURATION: 100 % | BODY MASS INDEX: 40.43 KG/M2 | DIASTOLIC BLOOD PRESSURE: 78 MMHG

## 2022-10-27 DIAGNOSIS — G47.33 OBSTRUCTIVE SLEEP APNEA SYNDROME: ICD-10-CM

## 2022-10-27 DIAGNOSIS — E78.49 OTHER HYPERLIPIDEMIA: ICD-10-CM

## 2022-10-27 DIAGNOSIS — Z79.4 TYPE 2 DIABETES MELLITUS WITH OTHER CIRCULATORY COMPLICATION, WITH LONG-TERM CURRENT USE OF INSULIN: ICD-10-CM

## 2022-10-27 DIAGNOSIS — E11.59 TYPE 2 DIABETES MELLITUS WITH OTHER CIRCULATORY COMPLICATION, WITH LONG-TERM CURRENT USE OF INSULIN: ICD-10-CM

## 2022-10-27 DIAGNOSIS — E66.01 MORBID (SEVERE) OBESITY DUE TO EXCESS CALORIES: ICD-10-CM

## 2022-10-27 DIAGNOSIS — I10 ESSENTIAL HYPERTENSION: ICD-10-CM

## 2022-10-27 DIAGNOSIS — Z95.5 PRESENCE OF STENT IN CORONARY ARTERY: ICD-10-CM

## 2022-10-27 DIAGNOSIS — I25.10 CORONARY ARTERIOSCLEROSIS IN NATIVE ARTERY: Primary | ICD-10-CM

## 2022-10-27 PROBLEM — I25.119 CORONARY ARTERY DISEASE INVOLVING NATIVE CORONARY ARTERY OF NATIVE HEART WITH ANGINA PECTORIS (HCC): Status: RESOLVED | Noted: 2022-10-07 | Resolved: 2022-10-27

## 2022-10-27 PROCEDURE — 99214 OFFICE O/P EST MOD 30 MIN: CPT | Performed by: INTERNAL MEDICINE

## 2022-10-27 PROCEDURE — 93000 ELECTROCARDIOGRAM COMPLETE: CPT | Performed by: INTERNAL MEDICINE

## 2022-10-27 RX ORDER — ISOSORBIDE MONONITRATE 30 MG/1
30 TABLET, EXTENDED RELEASE ORAL
Qty: 180 TABLET | Refills: 3 | Status: SHIPPED | OUTPATIENT
Start: 2022-10-27

## 2022-10-27 RX ORDER — ATORVASTATIN CALCIUM 80 MG/1
80 TABLET, FILM COATED ORAL DAILY
Qty: 90 TABLET | Refills: 3 | Status: SHIPPED | OUTPATIENT
Start: 2022-10-27

## 2022-10-27 RX ORDER — TELMISARTAN 80 MG/1
80 TABLET ORAL DAILY
Qty: 90 TABLET | Refills: 3 | Status: SHIPPED | OUTPATIENT
Start: 2022-10-27

## 2022-10-27 RX ORDER — EZETIMIBE 10 MG/1
10 TABLET ORAL DAILY
Qty: 90 TABLET | Refills: 3 | Status: SHIPPED | OUTPATIENT
Start: 2022-10-27

## 2022-10-27 RX ORDER — LEVOTHYROXINE SODIUM 0.12 MG/1
TABLET ORAL 2 TIMES DAILY
COMMUNITY
Start: 2022-10-20

## 2022-10-27 RX ORDER — PRASUGREL 10 MG/1
10 TABLET, FILM COATED ORAL DAILY
Qty: 90 TABLET | Refills: 3 | Status: SHIPPED | OUTPATIENT
Start: 2022-10-27

## 2022-10-27 RX ORDER — METOPROLOL SUCCINATE 100 MG/1
100 TABLET, EXTENDED RELEASE ORAL DAILY
Qty: 90 TABLET | Refills: 3 | Status: SHIPPED | OUTPATIENT
Start: 2022-10-27

## 2022-10-27 RX ORDER — FINASTERIDE 1 MG/1
1 TABLET, FILM COATED ORAL DAILY
COMMUNITY
Start: 2022-10-06

## 2022-10-27 NOTE — PROGRESS NOTES
CARDIOLOGY    Catrina Horn MD    ENCOUNTER DATE:  10/27/2022    Jonel Garza / 47 y.o. / male        CHIEF COMPLAINT / REASON FOR OFFICE VISIT     Heart Problem (Follow up Post Cath/Stent 10/7/2022  /CAD )      HISTORY OF PRESENT ILLNESS       HPI    Jonel Garza is a 47 y.o. 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  He has a history of diabetes, hypertension, and hyperlipidemia as well as a family history of coronary artery disease. He was having right-sided chest pain in the fall of 2014. He had an echocardiogram in 03/2014, which showed normal left ventricular function with an ejection fraction of 62% and no significant valvular heart disease. Because of his chest pain, he had a catheterization in 12/2014, which showed left main normal, left anterior descending artery 20% proximal lesion, circumflex nondominant with a 20% mid lesion, and right coronary artery dominant with a mid-99% lesion. His inferior wall was felt to be hypokinetic and ejection fraction of 45% to 50%. He had a Xience drug-eluting stent placed to the right coronary artery. He was having more chest pain in 05/2015 and underwent a stress echocardiogram 5/15, which showed mild left ventricular hypertrophy, normal left ventricular function with an ejection fraction of 60%, normal diastolic filling, and again no valvular heart disease. He exercised for 6 minutes and 59 seconds on the Adithya protocol and had no ST changes, and his stress echocardiogram images were normal. Because his chest pain continued, he went on and had a heart catheterization in 05/2015, which showed left main normal, left anterior descending artery 20% proximal lesion, and circumflex normal. Right coronary artery had a patent stent in the mid vessel and a 40% mid to distal lesion. Again, his inferior wall was felt to be  hypokinetic with an ejection fraction of 45%.       I saw him back in October 2017.  He was complaining of a right-sided chest pain similar to what he experienced before his stent.  It resolved with nitroglycerin.  I recommended a heart catheterization.  That showed a 40% proximal LAD lesion, normal circumflex, right coronary artery is a patent stent and up to 30% disease.  There is an RV branch with a 40% lesion.  No intervention was performed.     When I saw him in April 2019 he was complaining of some atypical chest pain.  We went over the options as far as PET stress test but he chose to avoid it to avoid radiation.     I saw him in October 2019.  At that time he was complaining of a right-sided heaviness.  Nuclear stress test in October 2019 showed normal perfusion of the myocardium with no ischemia.  Ejection fraction 64%.     He had a PET stress test in December 2020 showing an ejection fraction of 66% and no evidence of ischemia or infarction.  I saw him in September 2022 when he was complaining of chest pain that felt like his anginal chest pain.  I sent him for a heart catheterization and he had overlapping Xience drug-eluting stents to a 90% mid LAD lesion.  He had a drug-eluting stent to the mid right coronary artery.  The circumflex and left main looks normal.  There was normal left ventricular size and function.     His chest pain is much better.  Breathing stable.  Still having some soreness in the right wrist but no significant bruising.    REVIEW OF SYSTEMS     Review of Systems   Constitutional: Negative for chills, fever, weight gain and weight loss.   Cardiovascular: Negative for leg swelling.   Respiratory: Negative for cough, snoring and wheezing.    Hematologic/Lymphatic: Negative for bleeding problem. Does not bruise/bleed easily.   Skin: Negative for color change.   Musculoskeletal: Negative for falls, joint pain and myalgias.   Gastrointestinal: Negative for melena.   Genitourinary:  "Negative for hematuria.   Neurological: Negative for excessive daytime sleepiness.   Psychiatric/Behavioral: Negative for depression. The patient is not nervous/anxious.          VITAL SIGNS     Visit Vitals  /78 (BP Location: Right arm, Patient Position: Sitting, Cuff Size: Large Adult)   Pulse 67   Resp 18   Ht 188 cm (74\")   Wt (!) 160 kg (352 lb)   SpO2 100%   BMI 45.19 kg/m²         Wt Readings from Last 3 Encounters:   10/27/22 (!) 160 kg (352 lb)   10/07/22 (!) 159 kg (350 lb)   09/29/22 (!) 162 kg (358 lb)     Body mass index is 45.19 kg/m².      PHYSICAL EXAMINATION     Vitals reviewed.   Constitutional:       Appearance: Normal appearance. Well-developed.   Eyes:      General: Lids are normal. Lids are everted, no foreign bodies appreciated.      Conjunctiva/sclera: Conjunctivae normal.      Pupils: Pupils are equal, round, and reactive to light.   HENT:      Head: Normocephalic and atraumatic.   Neck:      Thyroid: No thyroid mass.      Vascular: No carotid bruit or JVD.      Trachea: No tracheal deviation.   Pulmonary:      Effort: Pulmonary effort is normal.      Breath sounds: Normal breath sounds.   Cardiovascular:      Normal rate. Regular rhythm.   Pulses:     Dorsalis pedis: 2+ bilaterally.  Abdominal:      General: Bowel sounds are normal.   Musculoskeletal: Normal range of motion.      Cervical back: Normal range of motion. Skin:     General: Skin is warm and dry.   Neurological:      Mental Status: Alert.   Psychiatric:         Behavior: Behavior normal.           REVIEWED DATA       ECG 12 Lead    Date/Time: 10/27/2022 1:31 PM  Performed by: Catrina Horn MD  Authorized by: Catrina Horn MD   Comparison: compared with previous ECG from 10/8/2022  Similar to previous ECG  Rhythm: sinus rhythm  BPM: 67  Conduction: non-specific intraventricular conduction delay  ST Segments: ST segments normal  T Waves: T waves normal    Clinical impression: normal ECG              Lipid Panel  "   Lipid Panel 10/8/22   Total Cholesterol 149   Triglycerides 115   HDL Cholesterol 38 (A)   VLDL Cholesterol 21   LDL Cholesterol  90   LDL/HDL Ratio 2.32   (A) Abnormal value              Lab Results   Component Value Date    GLUCOSE 184 (H) 10/08/2022    BUN 10 10/08/2022    CREATININE 0.75 (L) 10/08/2022    EGFRIFNONA 114 12/21/2017    EGFRIFAFRI 138 12/21/2017    BCR 13.3 10/08/2022    K 4.0 10/08/2022    CO2 29.6 (H) 10/08/2022    CALCIUM 9.3 10/08/2022    PROTENTOTREF 7.4 12/21/2017    ALBUMIN 4.1 05/06/2022    LABIL2 1.4 05/06/2022    AST 22 05/06/2022    ALT 35 05/06/2022       ASSESSMENT & PLAN      Diagnosis Plan   1. Coronary arteriosclerosis in native artery  Cardiac Rehab Phase II      2. Type 2 diabetes mellitus with other circulatory complication, with long-term current use of insulin ([facility])  Cardiac Rehab Phase II      3. Morbid (severe) obesity due to excess calories ([facility])  Cardiac Rehab Phase II      4. Obstructive sleep apnea syndrome  Cardiac Rehab Phase II      5. Other hyperlipidemia  Cardiac Rehab Phase II      6. Essential hypertension  Cardiac Rehab Phase II      7. Presence of stent in coronary artery  Cardiac Rehab Phase II        1. Coronary artery disease with a stent to the mid right coronary artery in 12/2014.   - He had a repeat catheterization in 05/2015 for recurrent chest pain, which showed his stent was patent and he only had nonobstructive lesions noted.    - He had another heart catheterization in November 2017 which continued to showed nonobstructive disease.  Stress test in 2020 was normal.  -He had 2 drug-eluting stents to the LAD and 1 drug-eluting stent to the right coronary artery in October 2022.  -Continue Effient, atorvastatin (going to try the 80mg QD day dose) and Toprol-XL.  -Continue Imdur and sublingual nitroglycerin as needed.  -Normal LV function.  2. Hypertension.  Blood pressure is well controlled.  3. Hyperlipidemia.  On atorvastatin and Zetia.  4. Diabetes.   On Jardiance as well as insulin.  A1c 8.1 earlier this month  5. Obstructive sleep apnea. Compliant with BiPAP.   6. Hypothyroid.   7. Obesity.  Very important that he lose weight and exercise and eat a heart healthy diet.  8. Vascular screening. He underwent vascular screening in 2015 which was normal.    Start cardiac rehab.  I refilled his medications for him.  I will see him back in 3 months.     Orders Placed This Encounter   Procedures   • Cardiac Rehab Phase II     Standing Status:   Future     Standing Expiration Date:   10/27/2023     Order Specific Question:   Diagnosis     Answer:   CAD (coronary artery disease) [140931]     Order Specific Question:   Date of Onset (of Diagnosis)     Answer:   10/6/2022     Order Specific Question:   Release to patient     Answer:   Immediate   • ECG 12 Lead     This order was created via procedure documentation     Order Specific Question:   Release to patient     Answer:   Routine Release           MEDICATIONS         Discharge Medications          Accurate as of October 27, 2022  1:31 PM. If you have any questions, ask your nurse or doctor.            Changes to Medications      Instructions Start Date   atorvastatin 80 MG tablet  Commonly known as: LIPITOR  What changed: medication strength  Changed by: Catrina Horn MD   80 mg, Oral, Daily      telmisartan 80 MG tablet  Commonly known as: MICARDIS  What changed: how much to take  Changed by: Catrina Horn MD   80 mg, Oral, Daily         Continue These Medications      Instructions Start Date   Accu-Chek Guera Plus test strip  Generic drug: glucose blood   Use to test BG 5 times daily      Accu-Chek Softclix Lancets lancets   Use to test BG 5 times daily      ammonium lactate 12 % cream  Commonly known as: AMLACTIN   Topical, As Needed      benzoyl peroxide-erythromycin 5-3 % gel  Commonly known as: BENZAMYCIN   Topical, As Needed      cetirizine 10 MG tablet  Commonly known as: zyrTEC   10 mg, Oral, As  Needed      Clickfine Pen Needles 31G X 6 MM misc  Generic drug: Insulin Pen Needle   No dose, route, or frequency recorded.      clobetasol 0.05 % cream  Commonly known as: TEMOVATE   APPLY TO AFFECTED AREA(S) TWO TIMES A DAY      empagliflozin 25 MG tablet tablet  Commonly known as: JARDIANCE   25 mg, Oral, Daily      ezetimibe 10 MG tablet  Commonly known as: ZETIA   10 mg, Oral, Daily      ferrous gluconate 324 MG tablet  Commonly known as: FERGON   324 mg, Oral, Daily PRN      finasteride 1 MG tablet  Commonly known as: PROPECIA   1 mg, Oral, Daily      fluticasone 50 MCG/ACT nasal spray  Commonly known as: FLONASE   1 spray, Nasal, As Needed      insulin lispro 100 UNIT/ML injection  Commonly known as: HumaLOG   Up to 150 units daily via pump      isosorbide mononitrate 30 MG 24 hr tablet  Commonly known as: IMDUR   30 mg, Oral, 2 Times Daily (Nitrates)      ketoconazole 2 % shampoo  Commonly known as: NIZORAL   As Needed      Metamucil [facility] Fiber 63 % powder  Generic drug: Psyllium   2 Times Daily      metoprolol succinate  MG 24 hr tablet  Commonly known as: TOPROL-XL   100 mg, Oral, Daily      nitroglycerin 0.4 MG SL tablet  Commonly known as: NITROSTAT   0.4 mg, Sublingual, Every 5 Minutes PRN, Take no more than 3 doses in 15 minutes.      prasugrel 10 MG tablet  Commonly known as: EFFIENT   10 mg, Oral, Daily      SALINE MIST 0.65 % nasal spray  Generic drug: sodium chloride   As Needed      sertraline 100 MG tablet  Commonly known as: ZOLOFT   100 mg, Oral, Daily      Synthroid 125 MCG tablet  Generic drug: levothyroxine   Take 2 tablets by mouth Daily.      Synthroid 137 MCG tablet  Generic drug: levothyroxine   No dose, route, or frequency recorded.         Stop These Medications    diphenhydrAMINE 50 MG tablet  Commonly known as: BENADRYL  Stopped by: MD Catrina Pimentel MD  10/27/22  13:31 EDT    Part of this note may be an electronic  transcription/translation of spoken language to printed text using the Dragon dictation system.

## 2022-11-07 ENCOUNTER — TELEPHONE (OUTPATIENT)
Dept: CARDIAC REHAB | Facility: HOSPITAL | Age: 47
End: 2022-11-07

## 2022-11-07 NOTE — TELEPHONE ENCOUNTER
Called to schedule patient for cardiac rehab last week. Patient wanted to know his insurance coverage for the program prior to scheduling. Called his insurance and obtained the requested information. Message left for patient to return call.

## 2022-11-08 ENCOUNTER — TRANSCRIBE ORDERS (OUTPATIENT)
Dept: ADMINISTRATIVE | Facility: HOSPITAL | Age: 47
End: 2022-11-08

## 2022-11-08 ENCOUNTER — OFFICE (OUTPATIENT)
Dept: URBAN - METROPOLITAN AREA CLINIC 76 | Facility: CLINIC | Age: 47
End: 2022-11-08
Payer: COMMERCIAL

## 2022-11-08 ENCOUNTER — TELEPHONE (OUTPATIENT)
Dept: CARDIOLOGY | Facility: CLINIC | Age: 47
End: 2022-11-08

## 2022-11-08 VITALS
DIASTOLIC BLOOD PRESSURE: 70 MMHG | HEART RATE: 72 BPM | OXYGEN SATURATION: 95 % | HEIGHT: 74 IN | SYSTOLIC BLOOD PRESSURE: 119 MMHG | WEIGHT: 315 LBS

## 2022-11-08 DIAGNOSIS — K86.2 CYST AND PSEUDOCYST OF PANCREAS: ICD-10-CM

## 2022-11-08 DIAGNOSIS — K75.81 NONALCOHOLIC STEATOHEPATITIS: Primary | ICD-10-CM

## 2022-11-08 DIAGNOSIS — K75.81 NONALCOHOLIC STEATOHEPATITIS (NASH): ICD-10-CM

## 2022-11-08 DIAGNOSIS — K86.3 CYST AND PSEUDOCYST OF PANCREAS: ICD-10-CM

## 2022-11-08 DIAGNOSIS — K86.2 CYST OF PANCREAS: ICD-10-CM

## 2022-11-08 DIAGNOSIS — K90.0 CELIAC DISEASE: ICD-10-CM

## 2022-11-08 PROCEDURE — 99213 OFFICE O/P EST LOW 20 MIN: CPT | Performed by: INTERNAL MEDICINE

## 2022-11-08 NOTE — TELEPHONE ENCOUNTER
"----- Message from Raisa Dasilva MA sent at 11/7/2022  8:34 AM EST -----  Regarding: FW: cath report  Contact: [phone]    ----- Message -----  From: Jonel Garza  Sent: 11/7/2022  12:17 AM EST  To: Harpal Aviles [facility]  Subject: cath report                                      Conrad Pruittily, I sent this to Dr. Horn the week before last; could you kindly see that she gets it? I am wondering about the 50% stenosis. Thanks for your help.    Question about the proximal LAD with the diffuse 50% stenosis. The wording is confusing in the report, just want to clarify with you. About senior living through the \"Percutaneous intervention report\", it says, \"Decision was made at that point in time to cover the proximal segment as well due to evidence of thrombus and plaque.\" I am not clear about the second LAD stent. You mentioned it was overlapping, but is the second LAD stent in the proximal LAD with the 50% stenosis or was the proximal LAD with the 50% stenosis not stented and the plaque was broken up? If so, is it still 50% stenosed? If you would kindly clarify, I'd really appreciate it. Thank you so much for your help. Much appreciated.    "

## 2022-11-08 NOTE — TELEPHONE ENCOUNTER
Spoke with patient. He received Dr. Horn's mychart message and he had no further questions at this time.    Lou Iniguez RN  Triage [facility] – [place], [place]

## 2022-11-08 NOTE — TELEPHONE ENCOUNTER
I am going to send him a message directly in my chart but can you follow-up and see if he has any further questions    I agree that the verbiage on the heart cath is a little bit confusing.    Left main coronary artery is normal.  Left anterior descending artery has 50% disease just as it comes off the left main.  We generally do not do anything until blockage gets more than 70%.  The mid left anterior descending artery had a 95% stenosis.  The rest of the left anterior descending artery looked normal.  The circumflex looked fine.  The right coronary artery was the dominant vessel the stent previously placed in the right coronary artery was normal.  There was a 95% blockage in the right coronary artery just after the stent.    There was a drug-eluting stent placed to the lesion in the mid LAD.  After the stent was placed there was imaging performed to make sure everything was well covered and there was no significant plaque.  However there was evidence of thrombus and plaque so another stent was placed overlapping the previous one to make sure everything was open and again imaging was performed and everything looked good.    A second stent was placed into the blockage in the right coronary artery.    I reviewed the images myself and I feel like everything looks great at the end of the procedure.  I just really want you to work on exercise, healthy eating, diabetes control, blood pressure control, cholesterol control.

## 2022-11-14 ENCOUNTER — TELEPHONE (OUTPATIENT)
Dept: CARDIAC REHAB | Facility: HOSPITAL | Age: 47
End: 2022-11-14

## 2022-11-14 NOTE — TELEPHONE ENCOUNTER
Patient reports he will not be able to attend cardiac rehab at this time due to patients having to wear masks while exercising.

## 2022-11-25 ENCOUNTER — APPOINTMENT (OUTPATIENT)
Dept: ULTRASOUND IMAGING | Facility: HOSPITAL | Age: 47
End: 2022-11-25

## 2022-11-29 ENCOUNTER — APPOINTMENT (OUTPATIENT)
Dept: ULTRASOUND IMAGING | Facility: HOSPITAL | Age: 47
End: 2022-11-29

## 2022-12-02 ENCOUNTER — APPOINTMENT (OUTPATIENT)
Dept: ULTRASOUND IMAGING | Facility: HOSPITAL | Age: 47
End: 2022-12-02

## 2022-12-02 ENCOUNTER — HOSPITAL ENCOUNTER (OUTPATIENT)
Dept: ULTRASOUND IMAGING | Facility: HOSPITAL | Age: 47
Discharge: HOME | End: 2022-12-02
Admitting: INTERNAL MEDICINE

## 2022-12-02 DIAGNOSIS — K86.3 CYST AND PSEUDOCYST OF PANCREAS: ICD-10-CM

## 2022-12-02 DIAGNOSIS — K75.81 NONALCOHOLIC STEATOHEPATITIS: ICD-10-CM

## 2022-12-02 DIAGNOSIS — K86.2 CYST AND PSEUDOCYST OF PANCREAS: ICD-10-CM

## 2022-12-02 PROCEDURE — 76705 ECHO EXAM OF ABDOMEN: CPT

## 2023-01-26 ENCOUNTER — OFFICE VISIT (OUTPATIENT)
Dept: CARDIOLOGY | Facility: CLINIC | Age: 48
End: 2023-01-26
Payer: COMMERCIAL

## 2023-01-26 ENCOUNTER — LAB (OUTPATIENT)
Dept: LAB | Facility: HOSPITAL | Age: 48
End: 2023-01-26
Payer: COMMERCIAL

## 2023-01-26 VITALS
RESPIRATION RATE: 20 BRPM | BODY MASS INDEX: 40.43 KG/M2 | WEIGHT: 315 LBS | HEART RATE: 71 BPM | HEIGHT: 74 IN | OXYGEN SATURATION: 99 % | SYSTOLIC BLOOD PRESSURE: 132 MMHG | DIASTOLIC BLOOD PRESSURE: 82 MMHG

## 2023-01-26 DIAGNOSIS — E66.01 MORBID (SEVERE) OBESITY DUE TO EXCESS CALORIES: ICD-10-CM

## 2023-01-26 DIAGNOSIS — G47.33 OBSTRUCTIVE SLEEP APNEA SYNDROME: ICD-10-CM

## 2023-01-26 DIAGNOSIS — I25.10 CORONARY ARTERIOSCLEROSIS IN NATIVE ARTERY: Primary | ICD-10-CM

## 2023-01-26 DIAGNOSIS — I10 ESSENTIAL HYPERTENSION: ICD-10-CM

## 2023-01-26 DIAGNOSIS — E11.59 TYPE 2 DIABETES MELLITUS WITH OTHER CIRCULATORY COMPLICATION, WITH LONG-TERM CURRENT USE OF INSULIN: ICD-10-CM

## 2023-01-26 DIAGNOSIS — Z79.4 TYPE 2 DIABETES MELLITUS WITH OTHER CIRCULATORY COMPLICATION, WITH LONG-TERM CURRENT USE OF INSULIN: ICD-10-CM

## 2023-01-26 DIAGNOSIS — E78.49 OTHER HYPERLIPIDEMIA: ICD-10-CM

## 2023-01-26 DIAGNOSIS — I25.10 CORONARY ARTERIOSCLEROSIS IN NATIVE ARTERY: ICD-10-CM

## 2023-01-26 LAB
CHOLEST SERPL-MCNC: 119 MG/DL (ref 0–200)
CK SERPL-CCNC: 148 U/L (ref 20–200)
DEPRECATED RDW RBC AUTO: 44.5 FL (ref 37–54)
ERYTHROCYTE [DISTWIDTH] IN BLOOD BY AUTOMATED COUNT: 14.8 % (ref 12.3–15.4)
HCT VFR BLD AUTO: 43.7 % (ref 37.5–51)
HDLC SERPL-MCNC: 38 MG/DL (ref 40–60)
HGB BLD-MCNC: 14.2 G/DL (ref 13–17.7)
LDLC SERPL CALC-MCNC: 58 MG/DL (ref 0–100)
LDLC/HDLC SERPL: 1.46 {RATIO}
MCH RBC QN AUTO: 26.7 PG (ref 26.6–33)
MCHC RBC AUTO-ENTMCNC: 32.5 G/DL (ref 31.5–35.7)
MCV RBC AUTO: 82.3 FL (ref 79–97)
PLATELET # BLD AUTO: 316 10*3/MM3 (ref 140–450)
PMV BLD AUTO: 8.7 FL (ref 6–12)
RBC # BLD AUTO: 5.31 10*6/MM3 (ref 4.14–5.8)
TRIGL SERPL-MCNC: 127 MG/DL (ref 0–150)
VLDLC SERPL-MCNC: 23 MG/DL (ref 5–40)
WBC NRBC COR # BLD: 10.94 10*3/MM3 (ref 3.4–10.8)

## 2023-01-26 PROCEDURE — 36415 COLL VENOUS BLD VENIPUNCTURE: CPT

## 2023-01-26 PROCEDURE — 82550 ASSAY OF CK (CPK): CPT

## 2023-01-26 PROCEDURE — 93000 ELECTROCARDIOGRAM COMPLETE: CPT | Performed by: INTERNAL MEDICINE

## 2023-01-26 PROCEDURE — 99214 OFFICE O/P EST MOD 30 MIN: CPT | Performed by: INTERNAL MEDICINE

## 2023-01-26 PROCEDURE — 80061 LIPID PANEL: CPT

## 2023-01-26 PROCEDURE — 85027 COMPLETE CBC AUTOMATED: CPT

## 2023-01-26 NOTE — PROGRESS NOTES
CARDIOLOGY    Catrina Horn MD    ENCOUNTER DATE:  01/26/2023    Jonel Garza / 47 y.o. / male        CHIEF COMPLAINT / REASON FOR OFFICE VISIT     Heart Problem (3 Month follow up/Coronary arteriosclerosis in native artery )      HISTORY OF PRESENT ILLNESS       HPI    Jonel Garza is a 47 y.o. 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  He has a history of diabetes, hypertension, and hyperlipidemia as well as a family history of coronary artery disease. He was having right-sided chest pain in the fall of 2014. He had an echocardiogram in 03/2014, which showed normal left ventricular function with an ejection fraction of 62% and no significant valvular heart disease. Because of his chest pain, he had a catheterization in 12/2014, which showed left main normal, left anterior descending artery 20% proximal lesion, circumflex nondominant with a 20% mid lesion, and right coronary artery dominant with a mid-99% lesion. His inferior wall was felt to be hypokinetic and ejection fraction of 45% to 50%. He had a Xience drug-eluting stent placed to the right coronary artery. He was having more chest pain in 05/2015 and underwent a stress echocardiogram 5/15, which showed mild left ventricular hypertrophy, normal left ventricular function with an ejection fraction of 60%, normal diastolic filling, and again no valvular heart disease. He exercised for 6 minutes and 59 seconds on the Adithya protocol and had no ST changes, and his stress echocardiogram images were normal. Because his chest pain continued, he went on and had a heart catheterization in 05/2015, which showed left main normal, left anterior descending artery 20% proximal lesion, and circumflex normal. Right coronary artery had a patent stent in the mid vessel and a 40% mid to distal lesion. Again, his inferior wall  was felt to be hypokinetic with an ejection fraction of 45%.       I saw him back in October 2017.  He was complaining of a right-sided chest pain similar to what he experienced before his stent.  It resolved with nitroglycerin.  I recommended a heart catheterization.  That showed a 40% proximal LAD lesion, normal circumflex, right coronary artery is a patent stent and up to 30% disease.  There is an RV branch with a 40% lesion.  No intervention was performed.     When I saw him in April 2019 he was complaining of some atypical chest pain.  We went over the options as far as PET stress test but he chose to avoid it to avoid radiation.     I saw him in October 2019.  At that time he was complaining of a right-sided heaviness.  Nuclear stress test in October 2019 showed normal perfusion of the myocardium with no ischemia.  Ejection fraction 64%.     He had a PET stress test in December 2020 showing an ejection fraction of 66% and no evidence of ischemia or infarction.  I saw him in September 2022 when he was complaining of chest pain that felt like his anginal chest pain.  I sent him for a heart catheterization and he had overlapping Xience drug-eluting stents to a 90% mid LAD lesion.  He had a drug-eluting stent to the mid right coronary artery.  The circumflex and left main looks normal.  There was normal left ventricular size and function.    He is feeling well.  He has occasional chest pain and uses nitroglycerin.  He is doing some minimal exercise.  He cannot commit to cardiac rehab because of his schedule.    REVIEW OF SYSTEMS     Review of Systems   Constitutional: Negative for chills, fever, weight gain and weight loss.   Cardiovascular: Negative for leg swelling.   Respiratory: Negative for cough, snoring and wheezing.    Hematologic/Lymphatic: Negative for bleeding problem. Does not bruise/bleed easily.   Skin: Negative for color change.   Musculoskeletal: Negative for falls, joint pain and myalgias.  "  Gastrointestinal: Negative for melena.   Genitourinary: Negative for hematuria.   Neurological: Negative for excessive daytime sleepiness.   Psychiatric/Behavioral: Negative for depression. The patient is not nervous/anxious.          VITAL SIGNS     Visit Vitals  /82 (BP Location: Right arm, Patient Position: Sitting, Cuff Size: Large Adult)   Pulse 71   Resp 20   Ht 188 cm (74\")   Wt (!) 160 kg (353 lb)   SpO2 99%   BMI 45.32 kg/m²         Wt Readings from Last 3 Encounters:   01/26/23 (!) 160 kg (353 lb)   10/27/22 (!) 160 kg (352 lb)   10/07/22 (!) 159 kg (350 lb)     Body mass index is 45.32 kg/m².      PHYSICAL EXAMINATION     Constitutional:       General: Not in acute distress.  Neck:      Vascular: No carotid bruit or JVD.   Pulmonary:      Effort: Pulmonary effort is normal.      Breath sounds: Normal breath sounds.   Cardiovascular:      Normal rate. Regular rhythm.      Murmurs: There is no murmur.   Psychiatric:         Mood and Affect: Mood and affect normal.           REVIEWED DATA       ECG 12 Lead    Date/Time: 1/26/2023 10:40 AM  Performed by: Catrina Horn MD  Authorized by: Catrina Horn MD   Comparison: compared with previous ECG from 10/27/2022  Similar to previous ECG  Rhythm: sinus rhythm  BPM: 71  Conduction: conduction normal  ST Segments: ST segments normal  T Waves: T waves normal    Clinical impression: normal ECG              Lipid Panel    Lipid Panel 10/8/22   Total Cholesterol 149   Triglycerides 115   HDL Cholesterol 38 (A)   VLDL Cholesterol 21   LDL Cholesterol  90   LDL/HDL Ratio 2.32   (A) Abnormal value              Lab Results   Component Value Date    GLUCOSE 184 (H) 10/08/2022    BUN 10 10/08/2022    CREATININE 0.75 (L) 10/08/2022    EGFRIFNONA 114 12/21/2017    EGFRIFAFRI 138 12/21/2017    BCR 13.3 10/08/2022    K 4.0 10/08/2022    CO2 29.6 (H) 10/08/2022    CALCIUM 9.3 10/08/2022    PROTENTOTREF 7.4 12/21/2017    ALBUMIN 4.1 05/06/2022    LABIL2 1.4 " 05/06/2022    AST 22 05/06/2022    ALT 35 05/06/2022       ASSESSMENT & PLAN      Diagnosis Plan   1. Coronary arteriosclerosis in native artery  Lipid Panel    CBC (No Diff)    CK      2. Other hyperlipidemia  Lipid Panel    CBC (No Diff)    CK      3. Essential hypertension  Lipid Panel    CBC (No Diff)    CK      4. Morbid (severe) obesity due to excess calories (HCC)  Lipid Panel    CBC (No Diff)    CK      5. Obstructive sleep apnea syndrome  Lipid Panel    CBC (No Diff)    CK      6. Type 2 diabetes mellitus with other circulatory complication, with long-term current use of insulin (HCC)  Lipid Panel    CBC (No Diff)    CK          1. Coronary artery disease with a stent to the mid right coronary artery in 12/2014.   - He had a repeat catheterization in 05/2015 for recurrent chest pain, which showed his stent was patent and he only had nonobstructive lesions noted.    - He had another heart catheterization in November 2017 which continued to showed nonobstructive disease.  Stress test in 2020 was normal.  -He had 2 drug-eluting stents to the LAD and 1 drug-eluting stent to the right coronary artery in October 2022.  -Continue Effient, atorvastatin and Toprol-XL.  -Continue Imdur and sublingual nitroglycerin as needed.  -Normal LV function.  -I went through the images from his heart catheterization with him to show him where the stents were and what the results of the heart cath were as he still had questions.  2. Hypertension.  Blood pressure is well controlled.  3. Hyperlipidemia.  On atorvastatin 80mg and Zetia.  I reviewed his lipid panel and his lipids are not at goal.  His LDL is actually gotten a bit worse since increasing atorvastatin.  I am going to repeat fasting labs today.  Consider Repatha or Praluent if his LDL is not at goal.  4. Diabetes.  On Jardiance as well as insulin.    Hemoglobin A1c is much improved from 8.1 back in October 2022.  5. Obstructive sleep apnea. Compliant with BiPAP.   6.  Hypothyroid.   7. Obesity.  Very important that he lose weight and exercise and eat a heart healthy diet.  8. Vascular screening. He underwent vascular screening in 2015 which was normal.    Follow-up with me in 6 months.    Orders Placed This Encounter   Procedures   • Lipid Panel     Standing Status:   Future     Number of Occurrences:   1     Standing Expiration Date:   1/26/2024     Order Specific Question:   Release to patient     Answer:   Routine Release   • CBC (No Diff)     Standing Status:   Future     Number of Occurrences:   1     Standing Expiration Date:   1/26/2024     Order Specific Question:   Release to patient     Answer:   Routine Release   • CK     Standing Status:   Future     Number of Occurrences:   1     Standing Expiration Date:   1/26/2024     Order Specific Question:   Release to patient     Answer:   Routine Release   • ECG 12 Lead     This order was created via procedure documentation     Order Specific Question:   Release to patient     Answer:   Routine Release           MEDICATIONS         Discharge Medications          Accurate as of January 26, 2023 10:40 AM. If you have any questions, ask your nurse or doctor.            Continue These Medications      Instructions Start Date   Accu-Chek Guera Plus test strip  Generic drug: glucose blood   Use to test BG 5 times daily      Accu-Chek Softclix Lancets lancets   Use to test BG 5 times daily      ammonium lactate 12 % cream  Commonly known as: AMLACTIN   Topical, As Needed      atorvastatin 80 MG tablet  Commonly known as: LIPITOR   80 mg, Oral, Daily      benzoyl peroxide-erythromycin 5-3 % gel  Commonly known as: BENZAMYCIN   Topical, As Needed      cetirizine 10 MG tablet  Commonly known as: zyrTEC   10 mg, Oral, As Needed, As neede      Clickfine Pen Needles 31G X 6 MM misc  Generic drug: Insulin Pen Needle   No dose, route, or frequency recorded.      clobetasol 0.05 % cream  Commonly known as: TEMOVATE   APPLY TO AFFECTED  AREA(S) TWO TIMES A DAY      empagliflozin 25 MG tablet tablet  Commonly known as: JARDIANCE   25 mg, Oral, Daily      ezetimibe 10 MG tablet  Commonly known as: ZETIA   10 mg, Oral, Daily      ferrous gluconate 324 MG tablet  Commonly known as: FERGON   324 mg, Oral, Daily PRN      finasteride 1 MG tablet  Commonly known as: PROPECIA   1 mg, Oral, Daily      fluticasone 50 MCG/ACT nasal spray  Commonly known as: FLONASE   1 spray, Nasal, As Needed      insulin lispro 100 UNIT/ML injection  Commonly known as: HumaLOG   Up to 150 units daily via pump      isosorbide mononitrate 30 MG 24 hr tablet  Commonly known as: IMDUR   30 mg, Oral, 2 Times Daily (Nitrates)      ketoconazole 2 % shampoo  Commonly known as: NIZORAL   As Needed      Metamucil [employer] Fiber 63 % powder  Generic drug: Psyllium   2 Times Daily      metoprolol succinate  MG 24 hr tablet  Commonly known as: TOPROL-XL   100 mg, Oral, Daily      nitroglycerin 0.4 MG SL tablet  Commonly known as: NITROSTAT   0.4 mg, Sublingual, Every 5 Minutes PRN, Take no more than 3 doses in 15 minutes.      prasugrel 10 MG tablet  Commonly known as: EFFIENT   10 mg, Oral, Daily      SALINE MIST 0.65 % nasal spray  Generic drug: sodium chloride   As Needed      sertraline 100 MG tablet  Commonly known as: ZOLOFT   100 mg, Oral, Daily      Synthroid 125 MCG tablet  Generic drug: levothyroxine   Take 2 tablets by mouth Daily.      levothyroxine 125 MCG tablet  Commonly known as: SYNTHROID, LEVOTHROID   2 Times Daily      telmisartan 80 MG tablet  Commonly known as: MICARDIS   80 mg, Oral, Daily               Catrina Horn MD  01/26/23  10:40 EST    Part of this note may be an electronic transcription/translation of spoken language to printed text using the Dragon dictation system.

## 2023-04-24 RX ORDER — NITROGLYCERIN 0.4 MG/1
TABLET SUBLINGUAL
Qty: 100 TABLET | Refills: 3 | Status: SHIPPED | OUTPATIENT
Start: 2023-04-24

## 2023-05-14 ENCOUNTER — PATIENT MESSAGE (OUTPATIENT)
Dept: CARDIOLOGY | Facility: CLINIC | Age: 48
End: 2023-05-14
Payer: COMMERCIAL

## 2023-05-14 DIAGNOSIS — Z51.81 ENCOUNTER FOR THERAPEUTIC DRUG LEVEL MONITORING: ICD-10-CM

## 2023-05-14 DIAGNOSIS — E78.49 OTHER HYPERLIPIDEMIA: Primary | ICD-10-CM

## 2023-05-14 DIAGNOSIS — E11.59 TYPE 2 DIABETES MELLITUS WITH OTHER CIRCULATORY COMPLICATION, WITH LONG-TERM CURRENT USE OF INSULIN: ICD-10-CM

## 2023-05-14 DIAGNOSIS — I25.10 CORONARY ARTERIOSCLEROSIS IN NATIVE ARTERY: ICD-10-CM

## 2023-05-14 DIAGNOSIS — Z79.4 TYPE 2 DIABETES MELLITUS WITH OTHER CIRCULATORY COMPLICATION, WITH LONG-TERM CURRENT USE OF INSULIN: ICD-10-CM

## 2023-05-17 NOTE — TELEPHONE ENCOUNTER
From: Jonel Garza  To: Catirna Horn  Sent: 5/14/2023 10:05 PM EDT  Subject: labs    Hi Bernadine,  I hope you are well. I recently had labs done at [facility]. As you might remember from a few months ago, that lab had some errors (last time they said my LDL cholesterol was much higher than it actually was compared to the [facility] lab). I need to be sure the labs are correct to have good clinical info. Would you please order the following 4 labs:    -lipid panel  -Hba1c  -Creatine Kinase (CK)  -CMP    Would it be possible to have these done this week? Thanks, Jonel

## 2023-05-22 ENCOUNTER — LAB (OUTPATIENT)
Dept: LAB | Facility: HOSPITAL | Age: 48
End: 2023-05-22
Payer: COMMERCIAL

## 2023-05-22 PROCEDURE — 83036 HEMOGLOBIN GLYCOSYLATED A1C: CPT | Performed by: NURSE PRACTITIONER

## 2023-05-22 PROCEDURE — 82550 ASSAY OF CK (CPK): CPT | Performed by: NURSE PRACTITIONER

## 2023-05-22 PROCEDURE — 80053 COMPREHEN METABOLIC PANEL: CPT | Performed by: NURSE PRACTITIONER

## 2023-05-22 PROCEDURE — 80061 LIPID PANEL: CPT | Performed by: NURSE PRACTITIONER

## 2023-08-04 RX ORDER — ATORVASTATIN CALCIUM 80 MG/1
80 TABLET, FILM COATED ORAL DAILY
Qty: 90 TABLET | Refills: 3 | Status: SHIPPED | OUTPATIENT
Start: 2023-08-04

## 2023-08-04 RX ORDER — PRASUGREL 10 MG/1
10 TABLET, FILM COATED ORAL DAILY
Qty: 90 TABLET | Refills: 3 | Status: SHIPPED | OUTPATIENT
Start: 2023-08-04

## 2023-08-16 ENCOUNTER — TELEPHONE (OUTPATIENT)
Dept: GASTROENTEROLOGY | Facility: CLINIC | Age: 48
End: 2023-08-16

## 2023-08-16 NOTE — TELEPHONE ENCOUNTER
Caller: Jonel Garza    Relationship to patient: Self    Best call back number: [phone]    Patient is needing: PATIENT RETURNED CALL TO OFFICE TO SCHEDULE. THE PATIENT WOULD LIKE YOU TO GO AHEAD AND SCHEDULED HIS APPOINTMENT AND LEAVE HIM A VOICEMAIL WITH THE DATE AND TIME PLEASE. HE SAID IT WILL BE HARD TO GET CONNECTED ON THE PHONE WITH HIS WORK SCHEDULE.

## 2023-10-02 NOTE — TELEPHONE ENCOUNTER
70-year-old male with a known past medical history of tobacco abuse, COPD, chronic bronchitis, diabetes type 2, Alzheimer's, and aspiration that presented to the ER 10/2 from his pulmonologist's office after his heart rate was noted to be in the 30s.  History comes from patient's daughter who is at bedside.  She reports over the past 6-8 weeks he has become increasingly weak with decreased activity, decreased appetite, and spells of dizziness.  She reports 1 possible syncopal episode where he fell about 6 weeks ago.  Workup in the ER revealed lab work with a creatinine of 2.5 (creatinine at baseline is 1.5-1.7) and a metabolic acidosis.  EKG in the ER with a rate of 36 and AV dissociation with narrow QRS complex for which cardiology was consulted.  It was recommended that patient be admitted to the ICU for close monitoring overnight with possible pacemaker placement tomorrow.  Critical care team consulted for admission to the ICU and further care management.   I do, but we can discuss at his next appt if he would prefer to wait

## 2023-10-20 RX ORDER — METOPROLOL SUCCINATE 100 MG/1
100 TABLET, EXTENDED RELEASE ORAL DAILY
Qty: 90 TABLET | Refills: 3 | Status: SHIPPED | OUTPATIENT
Start: 2023-10-20

## 2023-10-20 RX ORDER — EZETIMIBE 10 MG/1
10 TABLET ORAL DAILY
Qty: 90 TABLET | Refills: 3 | Status: SHIPPED | OUTPATIENT
Start: 2023-10-20

## 2023-10-26 ENCOUNTER — OFFICE VISIT (OUTPATIENT)
Dept: CARDIOLOGY | Facility: CLINIC | Age: 48
End: 2023-10-26
Payer: COMMERCIAL

## 2023-10-26 ENCOUNTER — LAB (OUTPATIENT)
Dept: LAB | Facility: HOSPITAL | Age: 48
End: 2023-10-26
Payer: COMMERCIAL

## 2023-10-26 VITALS
HEIGHT: 74 IN | RESPIRATION RATE: 18 BRPM | WEIGHT: 315 LBS | OXYGEN SATURATION: 98 % | DIASTOLIC BLOOD PRESSURE: 82 MMHG | HEART RATE: 71 BPM | SYSTOLIC BLOOD PRESSURE: 126 MMHG | BODY MASS INDEX: 40.43 KG/M2

## 2023-10-26 DIAGNOSIS — Z79.4 TYPE 2 DIABETES MELLITUS WITH OTHER CIRCULATORY COMPLICATION, WITH LONG-TERM CURRENT USE OF INSULIN: ICD-10-CM

## 2023-10-26 DIAGNOSIS — Z51.81 ENCOUNTER FOR THERAPEUTIC DRUG LEVEL MONITORING: ICD-10-CM

## 2023-10-26 DIAGNOSIS — E78.49 OTHER HYPERLIPIDEMIA: Primary | ICD-10-CM

## 2023-10-26 DIAGNOSIS — I25.10 CORONARY ARTERIOSCLEROSIS IN NATIVE ARTERY: ICD-10-CM

## 2023-10-26 DIAGNOSIS — E11.59 TYPE 2 DIABETES MELLITUS WITH OTHER CIRCULATORY COMPLICATION, WITH LONG-TERM CURRENT USE OF INSULIN: ICD-10-CM

## 2023-10-26 DIAGNOSIS — G47.33 OBSTRUCTIVE SLEEP APNEA SYNDROME: ICD-10-CM

## 2023-10-26 DIAGNOSIS — Z95.5 PRESENCE OF STENT IN CORONARY ARTERY: ICD-10-CM

## 2023-10-26 DIAGNOSIS — Z98.890 S/P CARDIAC CATH: ICD-10-CM

## 2023-10-26 DIAGNOSIS — E78.49 OTHER HYPERLIPIDEMIA: ICD-10-CM

## 2023-10-26 DIAGNOSIS — I10 ESSENTIAL HYPERTENSION: ICD-10-CM

## 2023-10-26 LAB
ALBUMIN SERPL-MCNC: 4 G/DL (ref 3.5–5.2)
ALBUMIN/GLOB SERPL: 1.1 G/DL
ALP SERPL-CCNC: 119 U/L (ref 39–117)
ALT SERPL W P-5'-P-CCNC: 35 U/L (ref 1–41)
ANION GAP SERPL CALCULATED.3IONS-SCNC: 8.3 MMOL/L (ref 5–15)
AST SERPL-CCNC: 18 U/L (ref 1–40)
BILIRUB SERPL-MCNC: 0.3 MG/DL (ref 0–1.2)
BUN SERPL-MCNC: 18 MG/DL (ref 6–20)
BUN/CREAT SERPL: 23.1 (ref 7–25)
CALCIUM SPEC-SCNC: 9.8 MG/DL (ref 8.6–10.5)
CHLORIDE SERPL-SCNC: 102 MMOL/L (ref 98–107)
CHOLEST SERPL-MCNC: 112 MG/DL (ref 0–200)
CK SERPL-CCNC: 126 U/L (ref 20–200)
CO2 SERPL-SCNC: 29.7 MMOL/L (ref 22–29)
CREAT SERPL-MCNC: 0.78 MG/DL (ref 0.76–1.27)
EGFRCR SERPLBLD CKD-EPI 2021: 109.3 ML/MIN/1.73
GLOBULIN UR ELPH-MCNC: 3.7 GM/DL
GLUCOSE SERPL-MCNC: 108 MG/DL (ref 65–99)
HBA1C MFR BLD: 6.9 % (ref 4.8–5.6)
HDLC SERPL-MCNC: 40 MG/DL (ref 40–60)
LDLC SERPL CALC-MCNC: 58 MG/DL (ref 0–100)
LDLC/HDLC SERPL: 1.48 {RATIO}
POTASSIUM SERPL-SCNC: 4.3 MMOL/L (ref 3.5–5.2)
PROT SERPL-MCNC: 7.7 G/DL (ref 6–8.5)
SODIUM SERPL-SCNC: 140 MMOL/L (ref 136–145)
TRIGL SERPL-MCNC: 65 MG/DL (ref 0–150)
VLDLC SERPL-MCNC: 14 MG/DL (ref 5–40)

## 2023-10-26 PROCEDURE — 80061 LIPID PANEL: CPT

## 2023-10-26 PROCEDURE — 82550 ASSAY OF CK (CPK): CPT

## 2023-10-26 PROCEDURE — 80053 COMPREHEN METABOLIC PANEL: CPT

## 2023-10-26 PROCEDURE — 83036 HEMOGLOBIN GLYCOSYLATED A1C: CPT

## 2023-10-26 PROCEDURE — 36415 COLL VENOUS BLD VENIPUNCTURE: CPT

## 2023-10-26 RX ORDER — ASPIRIN 81 MG/1
81 TABLET ORAL DAILY
COMMUNITY

## 2023-10-26 NOTE — PROGRESS NOTES
CARDIOLOGY    Catrina Horn MD    ENCOUNTER DATE:  10/26/2023    Jonel Garza / 48 y.o. / male        CHIEF COMPLAINT / REASON FOR OFFICE VISIT     Heart Problem      HISTORY OF PRESENT ILLNESS       HPI    Jonel Garza is a 48 y.o. 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  He has a history of diabetes, hypertension, and hyperlipidemia as well as a family history of coronary artery disease. He was having right-sided chest pain in the fall of 2014. He had an echocardiogram in 03/2014, which showed normal left ventricular function with an ejection fraction of 62% and no significant valvular heart disease. Because of his chest pain, he had a catheterization in 12/2014, which showed left main normal, left anterior descending artery 20% proximal lesion, circumflex nondominant with a 20% mid lesion, and right coronary artery dominant with a mid-99% lesion. His inferior wall was felt to be hypokinetic and ejection fraction of 45% to 50%. He had a Xience drug-eluting stent placed to the right coronary artery. He was having more chest pain in 05/2015 and underwent a stress echocardiogram 5/15, which showed mild left ventricular hypertrophy, normal left ventricular function with an ejection fraction of 60%, normal diastolic filling, and again no valvular heart disease. He exercised for 6 minutes and 59 seconds on the Adithya protocol and had no ST changes, and his stress echocardiogram images were normal. Because his chest pain continued, he went on and had a heart catheterization in 05/2015, which showed left main normal, left anterior descending artery 20% proximal lesion, and circumflex normal. Right coronary artery had a patent stent in the mid vessel and a 40% mid to distal lesion. Again, his inferior wall was felt to be hypokinetic with an ejection fraction of 45%.        I saw him back in October 2017.  He was complaining of a right-sided chest pain similar to what he experienced before his stent.  It resolved with nitroglycerin.  I recommended a heart catheterization.  That showed a 40% proximal LAD lesion, normal circumflex, right coronary artery is a patent stent and up to 30% disease.  There is an RV branch with a 40% lesion.  No intervention was performed.     When I saw him in April 2019 he was complaining of some atypical chest pain.  We went over the options as far as PET stress test but he chose to avoid it to avoid radiation.     I saw him in October 2019.  At that time he was complaining of a right-sided heaviness.  Nuclear stress test in October 2019 showed normal perfusion of the myocardium with no ischemia.  Ejection fraction 64%.     He had a PET stress test in December 2020 showing an ejection fraction of 66% and no evidence of ischemia or infarction.  I saw him in September 2022 when he was complaining of chest pain that felt like his anginal chest pain.  I sent him for a heart catheterization and he had overlapping Xience drug-eluting stents to a 90% mid LAD lesion.  He had a drug-eluting stent to the mid right coronary artery.  The circumflex and left main looks normal.  There was normal left ventricular size and function.     He has been feeling well.  He has been going to the gym more.  He has lost about 15 pounds since January.  He is not having any chest pain or shortness of breath.    REVIEW OF SYSTEMS     Review of Systems   Constitutional: Negative for chills, fever, weight gain and weight loss.   Cardiovascular:  Positive for leg swelling.   Respiratory:  Positive for snoring. Negative for cough and wheezing.    Hematologic/Lymphatic: Negative for bleeding problem. Does not bruise/bleed easily.   Skin:  Negative for color change.   Musculoskeletal:  Negative for falls, joint pain and myalgias.   Gastrointestinal:  Negative for melena.   Genitourinary:   "Negative for hematuria.   Neurological:  Negative for excessive daytime sleepiness.   Psychiatric/Behavioral:  Negative for depression. The patient is not nervous/anxious.          VITAL SIGNS     Visit Vitals  /82 (BP Location: Right arm, Patient Position: Sitting, Cuff Size: Large Adult)   Pulse 71   Resp 18   Ht 188 cm (74\")   Wt (!) 153 kg (338 lb)   SpO2 98%   BMI 43.40 kg/m²         Wt Readings from Last 3 Encounters:   10/26/23 (!) 153 kg (338 lb)   01/26/23 (!) 160 kg (353 lb)   10/27/22 (!) 160 kg (352 lb)     Body mass index is 43.4 kg/m².      PHYSICAL EXAMINATION     Constitutional:       General: Not in acute distress.  Neck:      Vascular: No carotid bruit or JVD.   Pulmonary:      Effort: Pulmonary effort is normal.      Breath sounds: Normal breath sounds.   Cardiovascular:      Normal rate. Regular rhythm.      Murmurs: There is no murmur.   Psychiatric:         Mood and Affect: Mood and affect normal.           REVIEWED DATA       ECG 12 Lead    Date/Time: 10/26/2023 10:01 AM  Performed by: Catrina Horn MD    Authorized by: Catrina Horn MD  Comparison: compared with previous ECG from 1/26/2023  Similar to previous ECG  Rhythm: sinus rhythm  BPM: 71  Conduction: conduction normal  ST Segments: ST segments normal  T Waves: T waves normal    Clinical impression: normal ECG            Lipid Panel          1/26/2023    09:50 5/22/2023    15:47   Lipid Panel   Total Cholesterol 119  128    Triglycerides 127  98    HDL Cholesterol 38  41    VLDL Cholesterol 23  18    LDL Cholesterol  58  69    LDL/HDL Ratio 1.46  1.64        Lab Results   Component Value Date    GLUCOSE 139 (H) 05/22/2023    BUN 11 05/22/2023    CREATININE 0.75 (L) 05/22/2023    EGFR 110.6 05/22/2023    BCR 14.7 05/22/2023    K 4.1 05/22/2023    CO2 28.0 05/22/2023    CALCIUM 9.7 05/22/2023    PROTENTOTREF 7.4 12/21/2017    ALBUMIN 3.5 05/22/2023    BILITOT 0.4 05/22/2023    AST 16 05/22/2023    ALT 27 05/22/2023 "       ASSESSMENT & PLAN      Diagnosis Plan   1. Other hyperlipidemia  Lipid Panel    [facility]    Hemoglobin A1c    Comprehensive Metabolic Panel      2. Coronary arteriosclerosis in native artery  Lipid Panel    [facility]    Hemoglobin A1c    Comprehensive Metabolic Panel      3. Encounter for therapeutic drug level monitoring  Lipid Panel    CK    Hemoglobin A1c    Comprehensive Metabolic Panel      4. Type 2 diabetes mellitus with other circulatory complication, with long-term current use of insulin  Lipid Panel    [facility]    Hemoglobin A1c    Comprehensive Metabolic Panel      5. Essential hypertension  Lipid Panel    CK    Hemoglobin A1c    Comprehensive Metabolic Panel      6. Obstructive sleep apnea syndrome  Lipid Panel    CK    Hemoglobin A1c    Comprehensive Metabolic Panel      7. S/P cardiac cath  Lipid Panel    CK    Hemoglobin A1c    Comprehensive Metabolic Panel      8. Presence of stent in coronary artery  Lipid Panel    CK    Hemoglobin A1c    Comprehensive Metabolic Panel        1. Coronary artery disease with a stent to the mid right coronary artery in 12/2014.   - He had a repeat catheterization in 05/2015 for recurrent chest pain, which showed his stent was patent and he only had nonobstructive lesions noted.    - He had another heart catheterization in November 2017 which continued to showed nonobstructive disease.  Stress test in 2020 was normal.  -He had 2 drug-eluting stents to the LAD and 1 drug-eluting stent to the right coronary artery in October 2022.  -Continue Effient, atorvastatin and Toprol-XL.  -Continue Imdur and sublingual nitroglycerin as needed.  -Normal LV function.  -I went through the images from his heart catheterization with him to show him where the stents were and what the results of the heart cath were as he still had questions at his last visit.  -He is asymptomatic right now so I am not going to order any further testing or make changes to his medicine.  2. Hypertension.  Well  controlled.  3. Hyperlipidemia.  On atorvastatin 80mg and Zetia.  He is getting lipids done today.  I like to see his LDL less than 70 may be close to 50.  May add Praluent or Repatha to his regimen.  4. Diabetes.  On Jardiance as well as insulin.  Check A1c today.  Continue with exercise and weight loss.  5. Obstructive sleep apnea. Compliant with BiPAP.   6. Hypothyroid.   7. Obesity.  He is doing really good with the weight loss and exercise.  I encouraged him to continue.  8. Vascular screening. He underwent vascular screening in 2015 which was normal.     Follow-up with me in 6 months.      Orders Placed This Encounter   Procedures    Lipid Panel     Standing Status:   Future     Number of Occurrences:   1     Standing Expiration Date:   10/26/2024     Order Specific Question:   Release to patient     Answer:   Routine Release [[id number]]    CK     Standing Status:   Future     Standing Expiration Date:   10/26/2024     Order Specific Question:   Release to patient     Answer:   Routine Release [[id number]]    Hemoglobin A1c     Standing Status:   Future     Standing Expiration Date:   10/26/2024     Order Specific Question:   Release to patient     Answer:   Routine Release [[id number]]    Comprehensive Metabolic Panel     Standing Status:   Future     Standing Expiration Date:   10/26/2024     Order Specific Question:   Release to patient     Answer:   Routine Release [[id number]]    ECG 12 Lead     This order was created via procedure documentation     Order Specific Question:   Release to patient     Answer:   Routine Release [[id number]]           MEDICATIONS         Discharge Medications            Accurate as of October 26, 2023 10:04 AM. If you have any questions, ask your nurse or doctor.                Continue These Medications        Instructions Start Date   Accu-Chek Guera Plus test strip  Generic drug: glucose blood   Use to test BG 5 times daily      Accu-Chek Softclix Lancets lancets   Use to  test BG 5 times daily      ammonium lactate 12 % cream  Commonly known as: AMLACTIN   Topical, As Needed      aspirin 81 MG EC tablet   81 mg, Oral, Daily      atorvastatin 80 MG tablet  Commonly known as: LIPITOR   80 mg, Oral, Daily      benzoyl peroxide-erythromycin 5-3 % gel  Commonly known as: BENZAMYCIN   Topical, As Needed      cetirizine 10 MG tablet  Commonly known as: zyrTEC   10 mg, Oral, As Needed, As neede      Clickfine Pen Needles 31G X 6 MM misc  Generic drug: Insulin Pen Needle   No dose, route, or frequency recorded.      clobetasol 0.05 % cream  Commonly known as: TEMOVATE   APPLY TO AFFECTED AREA(S) TWO TIMES A DAY      empagliflozin 25 MG tablet tablet  Commonly known as: JARDIANCE   25 mg, Oral, Daily      ezetimibe 10 MG tablet  Commonly known as: ZETIA   10 mg, Oral, Daily      ferrous gluconate 324 MG tablet  Commonly known as: FERGON   324 mg, Oral, Daily PRN      finasteride 1 MG tablet  Commonly known as: PROPECIA   1 mg, Oral, Daily      fluticasone 50 MCG/ACT nasal spray  Commonly known as: FLONASE   1 spray, Nasal, As Needed      insulin lispro 100 UNIT/ML injection  Commonly known as: HumaLOG   Up to 150 units daily via pump      isosorbide mononitrate 30 MG 24 hr tablet  Commonly known as: IMDUR   TAKE 1 TABLET BY MOUTH TWICE  DAILY      ketoconazole 2 % shampoo  Commonly known as: NIZORAL   As Needed      Metamucil [facility] Fiber 63 % powder  Generic drug: Psyllium   2 Times Daily      metoprolol succinate  MG 24 hr tablet  Commonly known as: TOPROL-XL   100 mg, Oral, Daily      nitroglycerin 0.4 MG SL tablet  Commonly known as: NITROSTAT   DISSOLVE 1 TABLET UNDER THE TONGUE EVERY 5 MINUTES AS  NEEDED FOR CHEST PAIN. MAX  OF 3 TABLETS IN 15 MINUTES. CALL 911 IF PAIN PERSISTS.      prasugrel 10 MG tablet  Commonly known as: EFFIENT   10 mg, Oral, Daily      SALINE MIST 0.65 % nasal spray  Generic drug: sodium chloride   As Needed      sertraline 100 MG tablet  Commonly known  as: ZOLOFT   100 mg, Oral, Daily      Synthroid 125 MCG tablet  Generic drug: levothyroxine   Take 2 tablets by mouth Daily.      levothyroxine 125 MCG tablet  Commonly known as: SYNTHROID, LEVOTHROID   2 Times Daily      telmisartan 80 MG tablet  Commonly known as: MICARDIS   80 mg, Oral, Daily                 Catrina Horn MD  10/26/23  10:04 EDT    Part of this note may be an electronic transcription/translation of spoken language to printed text using the Dragon dictation system.

## 2023-10-27 ENCOUNTER — TELEPHONE (OUTPATIENT)
Dept: CARDIOLOGY | Facility: CLINIC | Age: 48
End: 2023-10-27
Payer: COMMERCIAL

## 2023-10-27 NOTE — TELEPHONE ENCOUNTER
Called and left VM. Will continue to try to reach patient.     Lou Iniguez RN  [place] [facility] [place]

## 2023-10-27 NOTE — TELEPHONE ENCOUNTER
----- Message from DA Hutchins sent at 10/27/2023 11:23 AM EDT -----  Please let patient know that his hemoglobin A1c is 6.9.  He needs to share these labs with his PCP.

## 2023-10-30 NOTE — TELEPHONE ENCOUNTER
Notified patient of results/recommendations. Patient verbalized understanding.    Lou Iniguez RN  Triage [facility] [place]

## 2024-02-03 ENCOUNTER — APPOINTMENT (OUTPATIENT)
Dept: CT IMAGING | Facility: HOSPITAL | Age: 49
End: 2024-02-03
Payer: COMMERCIAL

## 2024-02-03 ENCOUNTER — HOSPITAL ENCOUNTER (EMERGENCY)
Facility: HOSPITAL | Age: 49
Discharge: HOME | End: 2024-02-04
Attending: EMERGENCY MEDICINE
Payer: COMMERCIAL

## 2024-02-03 DIAGNOSIS — H81.10 BENIGN PAROXYSMAL POSITIONAL VERTIGO, UNSPECIFIED LATERALITY: Primary | ICD-10-CM

## 2024-02-03 DIAGNOSIS — R11.2 NAUSEA AND VOMITING, UNSPECIFIED VOMITING TYPE: ICD-10-CM

## 2024-02-03 LAB
BASOPHILS # BLD AUTO: 0.07 10*3/MM3 (ref 0–0.2)
BASOPHILS NFR BLD AUTO: 0.7 % (ref 0–1.5)
DEPRECATED RDW RBC AUTO: 44.9 FL (ref 37–54)
EOSINOPHIL # BLD AUTO: 0.18 10*3/MM3 (ref 0–0.4)
EOSINOPHIL NFR BLD AUTO: 1.7 % (ref 0.3–6.2)
ERYTHROCYTE [DISTWIDTH] IN BLOOD BY AUTOMATED COUNT: 14.6 % (ref 12.3–15.4)
HCT VFR BLD AUTO: 49.9 % (ref 37.5–51)
HGB BLD-MCNC: 16.1 G/DL (ref 13–17.7)
IMM GRANULOCYTES # BLD AUTO: 0.03 10*3/MM3 (ref 0–0.05)
IMM GRANULOCYTES NFR BLD AUTO: 0.3 % (ref 0–0.5)
LYMPHOCYTES # BLD AUTO: 1.54 10*3/MM3 (ref 0.7–3.1)
LYMPHOCYTES NFR BLD AUTO: 14.5 % (ref 19.6–45.3)
MCH RBC QN AUTO: 26.6 PG (ref 26.6–33)
MCHC RBC AUTO-ENTMCNC: 32.3 G/DL (ref 31.5–35.7)
MCV RBC AUTO: 82.3 FL (ref 79–97)
MONOCYTES # BLD AUTO: 0.69 10*3/MM3 (ref 0.1–0.9)
MONOCYTES NFR BLD AUTO: 6.5 % (ref 5–12)
NEUTROPHILS NFR BLD AUTO: 76.3 % (ref 42.7–76)
NEUTROPHILS NFR BLD AUTO: 8.12 10*3/MM3 (ref 1.7–7)
NRBC BLD AUTO-RTO: 0 /100 WBC (ref 0–0.2)
PLATELET # BLD AUTO: 324 10*3/MM3 (ref 140–450)
PMV BLD AUTO: 9 FL (ref 6–12)
RBC # BLD AUTO: 6.06 10*6/MM3 (ref 4.14–5.8)
WBC NRBC COR # BLD AUTO: 10.63 10*3/MM3 (ref 3.4–10.8)

## 2024-02-03 PROCEDURE — 96375 TX/PRO/DX INJ NEW DRUG ADDON: CPT

## 2024-02-03 PROCEDURE — 85025 COMPLETE CBC W/AUTO DIFF WBC: CPT | Performed by: EMERGENCY MEDICINE

## 2024-02-03 PROCEDURE — 25810000003 SODIUM CHLORIDE 0.9 % SOLUTION: Performed by: EMERGENCY MEDICINE

## 2024-02-03 PROCEDURE — 70450 CT HEAD/BRAIN W/O DYE: CPT

## 2024-02-03 PROCEDURE — 93005 ELECTROCARDIOGRAM TRACING: CPT | Performed by: EMERGENCY MEDICINE

## 2024-02-03 PROCEDURE — 80053 COMPREHEN METABOLIC PANEL: CPT | Performed by: EMERGENCY MEDICINE

## 2024-02-03 PROCEDURE — 99284 EMERGENCY DEPT VISIT MOD MDM: CPT

## 2024-02-03 PROCEDURE — 93010 ELECTROCARDIOGRAM REPORT: CPT | Performed by: INTERNAL MEDICINE

## 2024-02-03 PROCEDURE — 83605 ASSAY OF LACTIC ACID: CPT | Performed by: EMERGENCY MEDICINE

## 2024-02-03 PROCEDURE — 96374 THER/PROPH/DIAG INJ IV PUSH: CPT

## 2024-02-03 PROCEDURE — 25010000002 ONDANSETRON PER 1 MG: Performed by: EMERGENCY MEDICINE

## 2024-02-03 PROCEDURE — 25010000002 DIAZEPAM PER 5 MG: Performed by: EMERGENCY MEDICINE

## 2024-02-03 RX ORDER — ONDANSETRON 2 MG/ML
4 INJECTION INTRAMUSCULAR; INTRAVENOUS ONCE
Status: COMPLETED | OUTPATIENT
Start: 2024-02-03 | End: 2024-02-03

## 2024-02-03 RX ORDER — DIAZEPAM 5 MG/ML
5 INJECTION, SOLUTION INTRAMUSCULAR; INTRAVENOUS ONCE
Status: COMPLETED | OUTPATIENT
Start: 2024-02-03 | End: 2024-02-03

## 2024-02-03 RX ORDER — SODIUM CHLORIDE 0.9 % (FLUSH) 0.9 %
10 SYRINGE (ML) INJECTION AS NEEDED
Status: DISCONTINUED | OUTPATIENT
Start: 2024-02-03 | End: 2024-02-04 | Stop reason: HOSPADM

## 2024-02-03 RX ORDER — MECLIZINE HYDROCHLORIDE 25 MG/1
25 TABLET ORAL ONCE
Status: COMPLETED | OUTPATIENT
Start: 2024-02-03 | End: 2024-02-03

## 2024-02-03 RX ADMIN — DIAZEPAM 5 MG: 5 INJECTION, SOLUTION INTRAMUSCULAR; INTRAVENOUS at 23:37

## 2024-02-03 RX ADMIN — ONDANSETRON 4 MG: 2 INJECTION INTRAMUSCULAR; INTRAVENOUS at 23:37

## 2024-02-03 RX ADMIN — SODIUM CHLORIDE 1000 ML: 9 INJECTION, SOLUTION INTRAVENOUS at 23:35

## 2024-02-03 RX ADMIN — MECLIZINE HYDROCHLORIDE 25 MG: 25 TABLET ORAL at 23:37

## 2024-02-04 VITALS
BODY MASS INDEX: 40.43 KG/M2 | DIASTOLIC BLOOD PRESSURE: 89 MMHG | TEMPERATURE: 96.8 F | WEIGHT: 315 LBS | OXYGEN SATURATION: 98 % | HEIGHT: 74 IN | SYSTOLIC BLOOD PRESSURE: 156 MMHG | RESPIRATION RATE: 16 BRPM | HEART RATE: 74 BPM

## 2024-02-04 LAB
ALBUMIN SERPL-MCNC: 4.1 G/DL (ref 3.5–5.2)
ALBUMIN/GLOB SERPL: 1.1 G/DL
ALP SERPL-CCNC: 112 U/L (ref 39–117)
ALT SERPL W P-5'-P-CCNC: 38 U/L (ref 1–41)
ANION GAP SERPL CALCULATED.3IONS-SCNC: 12.5 MMOL/L (ref 5–15)
AST SERPL-CCNC: 21 U/L (ref 1–40)
BILIRUB SERPL-MCNC: 0.4 MG/DL (ref 0–1.2)
BUN SERPL-MCNC: 13 MG/DL (ref 6–20)
BUN/CREAT SERPL: 18.6 (ref 7–25)
CALCIUM SPEC-SCNC: 9.5 MG/DL (ref 8.6–10.5)
CHLORIDE SERPL-SCNC: 102 MMOL/L (ref 98–107)
CO2 SERPL-SCNC: 25.5 MMOL/L (ref 22–29)
CREAT SERPL-MCNC: 0.7 MG/DL (ref 0.76–1.27)
D-LACTATE SERPL-SCNC: 1.3 MMOL/L (ref 0.5–2)
EGFRCR SERPLBLD CKD-EPI 2021: 113 ML/MIN/1.73
GLOBULIN UR ELPH-MCNC: 3.8 GM/DL
GLUCOSE SERPL-MCNC: 147 MG/DL (ref 65–99)
POTASSIUM SERPL-SCNC: 3.9 MMOL/L (ref 3.5–5.2)
PROT SERPL-MCNC: 7.9 G/DL (ref 6–8.5)
QT INTERVAL: 391 MS
QTC INTERVAL: 431 MS
SODIUM SERPL-SCNC: 140 MMOL/L (ref 136–145)

## 2024-02-04 RX ORDER — MECLIZINE HYDROCHLORIDE 25 MG/1
25 TABLET ORAL 3 TIMES DAILY PRN
Qty: 12 TABLET | Refills: 0 | Status: SHIPPED | OUTPATIENT
Start: 2024-02-04

## 2024-02-04 RX ORDER — DIAZEPAM 5 MG/1
5 TABLET ORAL EVERY 8 HOURS PRN
Qty: 10 TABLET | Refills: 0 | Status: SHIPPED | OUTPATIENT
Start: 2024-02-04

## 2024-02-04 RX ORDER — ONDANSETRON 8 MG/1
8 TABLET, ORALLY DISINTEGRATING ORAL EVERY 8 HOURS PRN
Qty: 12 TABLET | Refills: 0 | Status: SHIPPED | OUTPATIENT
Start: 2024-02-04

## 2024-02-04 NOTE — ED PROVIDER NOTES
EMERGENCY DEPARTMENT ENCOUNTER    Room Number:  12/12  PCP: Adriano Temple PA  Historian: Patient      HPI:  Chief Complaint: Dizzy  A complete HPI/ROS/PMH/PSH/SH/FH are unobtainable due to: None  Context: Jonel Garza is a 48 y.o. male who presents to the ED c/o fairly sudden onset of dizziness that occurred shortly prior to ED arrival today.  He describes the dizziness as a spinning sensation that is made worse by head movement and body position changes.  He reports associated nausea and vomiting.  He denies headache, blurry vision, neck pain, chest pain, abdominal pain, or fever/chills.            PAST MEDICAL HISTORY  Active Ambulatory Problems     Diagnosis Date Noted    Coronary arteriosclerosis in native artery 02/01/2016    Other hyperlipidemia 02/01/2016    Type 2 diabetes mellitus with circulatory disorder, with long-term current use of insulin 02/01/2016    Obstructive sleep apnea syndrome 02/01/2016    Hypothyroidism 02/01/2016    Celiac disease 03/07/2016    Chest pain 05/26/2015    Gastroesophageal reflux disease 12/22/2016    Morbid (severe) obesity due to excess calories 03/07/2014    Knee pain 12/16/2015    Presence of stent in coronary artery 05/26/2015    Osteoarthritis of knee 06/04/2015    Iron deficiency 12/22/2016    Hypogonadism in male 12/22/2016    Anemia 10/26/2017    Precordial pain 10/31/2017    Essential hypertension 10/31/2017    Diastasis recti 05/24/2021     Resolved Ambulatory Problems     Diagnosis Date Noted    Essential hypertension 02/01/2016    Morbid obesity due to excess calories 02/01/2016    Hx of echocardiogram 03/14/2014    S/P cardiac cath 05/26/2015    Presence of coronary angioplasty implant and graft 06/01/2015    Uncontrolled diabetes mellitus type 2 without complications 12/22/2016    Coronary artery disease involving native coronary artery of native heart with angina pectoris 10/07/2022     Past Medical History:   Diagnosis Date    CAD in native artery      Coronary arteriosclerosis     Diabetes mellitus     Hyperlipidemia     Hypertension     Obesity     FARZAD on CPAP     Type 2 diabetes mellitus          PAST SURGICAL HISTORY  Past Surgical History:   Procedure Laterality Date    CARDIAC CATHETERIZATION N/A 11/08/2017    Procedure: Coronary angiography;  Surgeon: Zack Fernando MD;  Location:  [facility];  Service:     CARDIAC CATHETERIZATION N/A 11/08/2017    Procedure: Left Heart Cath;  Surgeon: Zack Fernando MD;  Location:  [facility];  Service:     CARDIAC CATHETERIZATION N/A 11/08/2017    Procedure: Left ventriculography;  Surgeon: Zack Fernando MD;  Location:  [facility];  Service:     CARDIAC CATHETERIZATION  2015    CARDIAC CATHETERIZATION N/A 10/07/2022    Procedure: Coronary angiography;  Surgeon: Maxi Macdonald MD;  Location:  [facility];  Service: Cardiology;  Laterality: N/A;    CARDIAC CATHETERIZATION N/A 10/07/2022    Procedure: Left Heart Cath;  Surgeon: Maxi Macdonald MD;  [facility];  Service: Cardiology;  Laterality: N/A;    CARDIAC CATHETERIZATION N/A 10/07/2022    Procedure: Left ventriculography;  Surgeon: Maxi Macdonald MD;  [facility];  Service: Cardiology;  Laterality: N/A;    CARDIAC CATHETERIZATION N/A 10/07/2022    Procedure: Stent LIBERTAD coronary;  Surgeon: Maxi Macdonald MD;  [facility];  Service: Cardiology;  Laterality: N/A;    CARDIAC CATHETERIZATION N/A 10/07/2022    Procedure: Percutaneous Coronary Intervention;  Surgeon: Maxi Macdonald MD;  [facility];  Service: Cardiology;  Laterality: N/A;    CARDIAC CATHETERIZATION N/A 10/07/2022    Procedure: Optical Coherent Tomography;  Surgeon: Maxi Macdonald MD;  [facility];  Service: Cardiology;  Laterality: N/A;    COLONOSCOPY  N/A 11/06/2014    CORONARY ANGIOPLASTY WITH STENT PLACEMENT      Drug-eluting stent placed to the mid right coronary artery in December 2014    CORONARY STENT PLACEMENT      TONSILLECTOMY      UPPER GASTROINTESTINAL ENDOSCOPY N/A 09/16/2019         FAMILY HISTORY  Family History   Problem Relation Age of Onset    Hypertension Mother     Heart disease Mother     Stroke Mother         CVA    Pancreatic cancer Mother     Heart disease Father     Hypertension Sister     Hypertension Brother     Diabetes type II Brother          SOCIAL HISTORY  Social History     Socioeconomic History    Marital status: Single   Tobacco Use    Smoking status: Never    Smokeless tobacco: Never   Vaping Use    Vaping Use: Never used   Substance and Sexual Activity    Alcohol use: No     Comment: occasional caffeine use    Drug use: No    Sexual activity: Defer         ALLERGIES  Ace inhibitors, Iodinated contrast media, Polyethylene glycol, Quinapril, Dobutamine, Rosuvastatin calcium, and Latex        REVIEW OF SYSTEMS  Review of Systems   Constitutional:  Negative for activity change, appetite change and fever.   HENT:  Negative for congestion and sore throat.    Eyes: Negative.    Respiratory:  Negative for cough and shortness of breath.    Cardiovascular:  Negative for chest pain and leg swelling.   Gastrointestinal:  Positive for nausea and vomiting. Negative for abdominal pain and diarrhea.   Endocrine: Negative.    Genitourinary:  Negative for decreased urine volume and dysuria.   Musculoskeletal:  Negative for neck pain.   Skin:  Negative for rash and wound.   Allergic/Immunologic: Negative.    Neurological:  Positive for dizziness. Negative for weakness, numbness and headaches.   Hematological: Negative.    Psychiatric/Behavioral: Negative.     All other systems reviewed and are negative.        PHYSICAL EXAM  ED Triage Vitals   Temp Heart Rate Resp BP SpO2   02/03/24 2151 02/03/24 2151 02/03/24 2151 02/03/24 2220 02/03/24 2151    96.8 °F (36 °C) 69 16 164/77 97 %      Temp src Heart Rate Source Patient Position BP Location FiO2 (%)   02/03/24 2151 02/03/24 2151 02/03/24 2220 02/03/24 2220 --   Tympanic Monitor Sitting Right arm        Physical Exam  Constitutional:       General: He is not in acute distress.     Appearance: Normal appearance. He is not ill-appearing or toxic-appearing.   HENT:      Head: Normocephalic and atraumatic.   Eyes:      Extraocular Movements: Extraocular movements intact.      Pupils: Pupils are equal, round, and reactive to light.   Cardiovascular:      Rate and Rhythm: Normal rate and regular rhythm.      Heart sounds: No murmur heard.     No friction rub. No gallop.   Pulmonary:      Effort: Pulmonary effort is normal.      Breath sounds: Normal breath sounds.   Abdominal:      General: Abdomen is flat. There is no distension.      Palpations: Abdomen is soft.      Tenderness: There is no abdominal tenderness.   Musculoskeletal:         General: No swelling or tenderness. Normal range of motion.      Cervical back: Normal range of motion and neck supple.   Skin:     General: Skin is warm and dry.   Neurological:      General: No focal deficit present.      Mental Status: He is alert and oriented to person, place, and time.      Sensory: No sensory deficit.      Motor: No weakness.      Comments: Increased dizziness with head movement   Psychiatric:         Mood and Affect: Mood normal.         Behavior: Behavior normal.           Vital signs and nursing notes reviewed.          LAB RESULTS  Recent Results (from the past 24 hour(s))   ECG 12 Lead Other; dizzy    Collection Time: 02/03/24 11:40 PM   Result Value Ref Range    QT Interval 391 ms    QTC Interval 431 ms   Comprehensive Metabolic Panel    Collection Time: 02/03/24 11:44 PM    Specimen: Blood   Result Value Ref Range    Glucose 147 (H) 65 - 99 mg/dL    BUN 13 6 - 20 mg/dL    Creatinine 0.70 (L) 0.76 - 1.27 mg/dL    Sodium 140 136 - 145 mmol/L     Potassium 3.9 3.5 - 5.2 mmol/L    Chloride 102 98 - 107 mmol/L    CO2 25.5 22.0 - 29.0 mmol/L    Calcium 9.5 8.6 - 10.5 mg/dL    Total Protein 7.9 6.0 - 8.5 g/dL    Albumin 4.1 3.5 - 5.2 g/dL    ALT (SGPT) 38 1 - 41 U/L    AST (SGOT) 21 1 - 40 U/L    Alkaline Phosphatase 112 39 - 117 U/L    Total Bilirubin 0.4 0.0 - 1.2 mg/dL    Globulin 3.8 gm/dL    A/G Ratio 1.1 g/dL    BUN/Creatinine Ratio 18.6 7.0 - 25.0    Anion Gap 12.5 5.0 - 15.0 mmol/L    eGFR 113.0 >60.0 mL/min/1.73   Lactic Acid, Plasma    Collection Time: 02/03/24 11:44 PM    Specimen: Blood   Result Value Ref Range    Lactate 1.3 0.5 - 2.0 mmol/L   CBC Auto Differential    Collection Time: 02/03/24 11:44 PM    Specimen: Blood   Result Value Ref Range    WBC 10.63 3.40 - 10.80 10*3/mm3    RBC 6.06 (H) 4.14 - 5.80 10*6/mm3    Hemoglobin 16.1 13.0 - 17.7 g/dL    Hematocrit 49.9 37.5 - 51.0 %    MCV 82.3 79.0 - 97.0 fL    MCH 26.6 26.6 - 33.0 pg    MCHC 32.3 31.5 - 35.7 g/dL    RDW 14.6 12.3 - 15.4 %    RDW-SD 44.9 37.0 - 54.0 fl    MPV 9.0 6.0 - 12.0 fL    Platelets 324 140 - 450 10*3/mm3    Neutrophil % 76.3 (H) 42.7 - 76.0 %    Lymphocyte % 14.5 (L) 19.6 - 45.3 %    Monocyte % 6.5 5.0 - 12.0 %    Eosinophil % 1.7 0.3 - 6.2 %    Basophil % 0.7 0.0 - 1.5 %    Immature Grans % 0.3 0.0 - 0.5 %    Neutrophils, Absolute 8.12 (H) 1.70 - 7.00 10*3/mm3    Lymphocytes, Absolute 1.54 0.70 - 3.10 10*3/mm3    Monocytes, Absolute 0.69 0.10 - 0.90 10*3/mm3    Eosinophils, Absolute 0.18 0.00 - 0.40 10*3/mm3    Basophils, Absolute 0.07 0.00 - 0.20 10*3/mm3    Immature Grans, Absolute 0.03 0.00 - 0.05 10*3/mm3    nRBC 0.0 0.0 - 0.2 /100 WBC       Ordered the above labs and reviewed the results.        RADIOLOGY  CT Head Without Contrast    Result Date: 2/4/2024  CT OF THE HEAD WITHOUT CONTRAST  HISTORY: Dizziness  COMPARISON: None available.  TECHNIQUE: Axial CT imaging was obtained through the brain. No IV contrast was administered.  FINDINGS: No acute intracranial  hemorrhage is seen. Ventricles are normal in size. There is no midline shift or mass effect. Mucous retention cyst is noted within the left ethmoid sinus. Mastoid air cells appear clear.      No acute intracranial findings.  Radiation dose reduction techniques were utilized, including automated exposure control and exposure modulation based on body size.   This report was finalized on 2/4/2024 12:23 AM by Dr. Laurel Reed M.D on Workstation: [id number]       Ordered the above noted radiological studies. Reviewed by me in PACS.            PROCEDURES  Procedures    EKG independently interpreted by myself as follows:    EKG          EKG time: 2340  Rhythm/Rate: NSR, 73  P waves and IA: nml  QRS, axis: nml, nml   ST and T waves: nml     Interpreted Contemporaneously by me, independently viewed  unchanged compared to prior 10/8/22        MEDICATIONS GIVEN IN ER  Medications   sodium chloride 0.9 % bolus 1,000 mL (0 mL Intravenous Stopped 2/4/24 0049)   ondansetron (ZOFRAN) injection 4 mg (4 mg Intravenous Given 2/3/24 2337)   diazePAM (VALIUM) injection 5 mg (5 mg Intravenous Given 2/3/24 2337)   meclizine (ANTIVERT) tablet 25 mg (25 mg Oral Given 2/3/24 2337)                   MEDICAL DECISION MAKING, PROGRESS, and CONSULTS    All labs have been independently reviewed by me.  All radiology studies have been reviewed by me and I have also reviewed the radiology report.   EKG's independently viewed and interpreted by me.  Discussion below represents my analysis of pertinent findings related to patient's condition, differential diagnosis, treatment plan and final disposition.      Additional sources:  - Discussed/ obtained information from independent historians: History obtained from the patient as well as the patient's family at bedside.    - External (non-ED) record review: Upon medical records review, the patient was last seen and evaluated in the outpatient office of endocrinology on 1/12/2024 in follow-up for  his known type 2 diabetes with hyperglycemia.    - Chronic or social conditions impacting care: CAD, type 2 diabetes mellitus    - Shared decision making: Discharge decision based on shared conversations have between myself as well as the patient and the patient's family at bedside.      Orders placed during this visit:  Orders Placed This Encounter   Procedures    CT Head Without Contrast    Comprehensive Metabolic Panel    Lactic Acid, Plasma    CBC Auto Differential    ECG 12 Lead Other; dizzy    CBC & Differential           Differential diagnosis includes but is not limited to:    Benign paroxysmal positional vertigo, acute labyrinthitis, vestibular neuritis, acute CVA, TIA, intracranial mass effect, intracranial hemorrhage, hypoglycemia, or severe electrolyte disturbance.      Independent interpretation of labs, radiology studies, and discussions with consultants:    Head CT was independently interpreted by myself with my interpretation showing no area of acute ischemia/infarct, hemorrhage, or mass effect.      ED Course as of 02/04/24 0624   Sun Feb 04, 2024 0131 On reevaluation, the patient reports that he is significantly improved following treatment with medication in the ED today.  He has been able to move around without recurrence of dizziness.  He does have an ear nose and throat doctor they can follow with and is stable for discharge.  Return instructions are given and they are in agreement with that plan.  All questions have been answered. [BM]      ED Course User Index  [BM] Jermaine Herron MD             DIAGNOSIS  Final diagnoses:   Benign paroxysmal positional vertigo, unspecified laterality   Nausea and vomiting, unspecified vomiting type         DISPOSITION  DISCHARGE    Patient discharged in stable condition.    Reviewed implications of results, diagnosis, meds, responsibility to follow up, warning signs and symptoms of possible worsening, potential complications and reasons to return to  ER.    Patient/Family voiced understanding of above instructions.    Discussed plan for discharge, as there is no emergent indication for admission. Patient referred to primary care provider for BP management due to today's BP. Pt/family is agreeable and understands need for follow up and repeat testing.  Pt is aware that discharge does not mean that nothing is wrong but it indicates no emergency is present that requires admission and they must continue care with follow-up as given below or physician of their choice.     FOLLOW-UP  Adriano Temple, PA  [address]   [address]  [place] [address]  [phone]    Schedule an appointment as soon as possible for a visit            Medication List        New Prescriptions      diazePAM 5 MG tablet  Commonly known as: VALIUM  Take 1 tablet by mouth Every 8 (Eight) Hours As Needed for Anxiety.     meclizine 25 MG tablet  Commonly known as: ANTIVERT  Take 1 tablet by mouth 3 (Three) Times a Day As Needed for Dizziness.     ondansetron ODT 8 MG disintegrating tablet  Commonly known as: ZOFRAN-ODT  Place 1 tablet under the tongue Every 8 (Eight) Hours As Needed for Vomiting or Nausea.               Where to Get Your Medications        These medications were sent to [facility] [id number] - [place], [place] - [address] AT [facility] & [address] - [phone]  - [phone] FX  [address], [place] [address]      Phone: [phone]   diazePAM 5 MG tablet  meclizine 25 MG tablet  ondansetron ODT 8 MG disintegrating tablet                   Latest Documented Vital Signs:  As of 06:24 EST  BP- 156/89 HR- 74 Temp- 96.8 °F (36 °C) (Tympanic) O2 sat- 98%              --    Please note that portions of this were completed with a voice recognition program.       Note Disclaimer: At [facility], we believe that sharing information builds trust and better relationships. You are receiving this note because you are receiving care at  [facility] or recently visited. It is possible you will see health information before a provider has talked with you about it. This kind of information can be easy to misunderstand. To help you fully understand what it means for your health, we urge you to discuss this note with your provider.             Jermaine Herron MD  02/04/24 0625

## 2024-05-24 RX ORDER — NITROGLYCERIN 0.4 MG/1
0.4 TABLET SUBLINGUAL AS NEEDED
Qty: 100 TABLET | Refills: 3 | Status: SHIPPED | OUTPATIENT
Start: 2024-05-24

## 2024-05-29 VITALS
WEIGHT: 315 LBS | HEIGHT: 74 IN | SYSTOLIC BLOOD PRESSURE: 142 MMHG | DIASTOLIC BLOOD PRESSURE: 73 MMHG | HEART RATE: 65 BPM | OXYGEN SATURATION: 95 %

## 2024-05-29 PROBLEM — K64.0 FIRST DEGREE HEMORRHOIDS: Status: ACTIVE | Noted: 2021-11-15

## 2024-05-29 PROBLEM — K22.89 OTHER SPECIFIED DISEASE OF ESOPHAGUS: Status: ACTIVE | Noted: 2022-01-31

## 2024-05-29 PROBLEM — K62.1 RECTAL POLYP: Status: ACTIVE | Noted: 2021-11-15

## 2024-05-29 PROBLEM — K62.5 RECTAL BLEEDING: Status: ACTIVE | Noted: 2021-11-15

## 2024-05-29 PROBLEM — K31.89 OTHER DISEASES OF STOMACH AND DUODENUM: Status: ACTIVE | Noted: 2022-01-31

## 2024-05-29 PROBLEM — K57.30 DVRTCLOS OF LG INT W/O PERFORATION OR ABSCESS W/O BLEEDING: Status: ACTIVE | Noted: 2021-11-15

## 2024-05-29 PROBLEM — K63.5 POLYP OF COLON: Status: ACTIVE | Noted: 2021-11-15

## 2024-05-30 ENCOUNTER — OFFICE (OUTPATIENT)
Dept: URBAN - METROPOLITAN AREA CLINIC 76 | Facility: CLINIC | Age: 49
End: 2024-05-30
Payer: COMMERCIAL

## 2024-05-30 DIAGNOSIS — Z80.0 FAMILY HISTORY OF MALIGNANT NEOPLASM OF DIGESTIVE ORGANS: ICD-10-CM

## 2024-05-30 DIAGNOSIS — K75.81 NONALCOHOLIC STEATOHEPATITIS (NASH): ICD-10-CM

## 2024-05-30 DIAGNOSIS — R12 HEARTBURN: ICD-10-CM

## 2024-05-30 DIAGNOSIS — K90.0 CELIAC DISEASE: ICD-10-CM

## 2024-05-30 DIAGNOSIS — K86.81 EXOCRINE PANCREATIC INSUFFICIENCY: ICD-10-CM

## 2024-05-30 PROCEDURE — 99214 OFFICE O/P EST MOD 30 MIN: CPT | Performed by: INTERNAL MEDICINE

## 2024-06-03 ENCOUNTER — TELEPHONE (OUTPATIENT)
Dept: CARDIOLOGY | Facility: CLINIC | Age: 49
End: 2024-06-03

## 2024-06-03 NOTE — TELEPHONE ENCOUNTER
[facility]    6/3/2024    Patient: Jonel Garza  YOB: 1975       According to current ACC/AHA perioperative risk management guidelines, endoscopies and procedures performed without general anesthesia are considered to be low risk procedures that do not require further cardiovascular risk stratification. The patient's aspirin and prasugrel can be held for the procedure for 7 days as his last stent was placed in 2022.  They should be resumed as soon as possible after the procedure.    If there are any problems during the procedure, please do not hesitate to contact my office.      Sincerely,    Electronically signed by Catrina Horn MD, 06/03/24, 4:39 PM EDT.  [facility]

## 2024-06-03 NOTE — TELEPHONE ENCOUNTER
[facility] is requesting a Cardiac clearance and consent to hold a Blood Thinner for a EGD/Colonoscopy to be done by Dr. Quang Potts.    They are also requesting authorization to hold Prasugrel for at least 7 days prior to procedure.      Please fax to [phone]

## 2024-06-20 RX ORDER — PRASUGREL 10 MG/1
10 TABLET, FILM COATED ORAL DAILY
Qty: 90 TABLET | Refills: 3 | Status: SHIPPED | OUTPATIENT
Start: 2024-06-20

## 2024-08-15 ENCOUNTER — OFFICE VISIT (OUTPATIENT)
Dept: CARDIOLOGY | Facility: CLINIC | Age: 49
End: 2024-08-15
Payer: COMMERCIAL

## 2024-08-15 VITALS
SYSTOLIC BLOOD PRESSURE: 126 MMHG | DIASTOLIC BLOOD PRESSURE: 80 MMHG | HEART RATE: 81 BPM | WEIGHT: 315 LBS | BODY MASS INDEX: 40.43 KG/M2 | HEIGHT: 74 IN

## 2024-08-15 DIAGNOSIS — I25.10 CORONARY ARTERIOSCLEROSIS IN NATIVE ARTERY: Primary | ICD-10-CM

## 2024-08-15 DIAGNOSIS — I10 ESSENTIAL HYPERTENSION: ICD-10-CM

## 2024-08-15 DIAGNOSIS — Z79.4 TYPE 2 DIABETES MELLITUS WITH OTHER CIRCULATORY COMPLICATION, WITH LONG-TERM CURRENT USE OF INSULIN: ICD-10-CM

## 2024-08-15 DIAGNOSIS — Z95.5 PRESENCE OF STENT IN CORONARY ARTERY: ICD-10-CM

## 2024-08-15 DIAGNOSIS — E11.59 TYPE 2 DIABETES MELLITUS WITH OTHER CIRCULATORY COMPLICATION, WITH LONG-TERM CURRENT USE OF INSULIN: ICD-10-CM

## 2024-08-15 DIAGNOSIS — E78.49 OTHER HYPERLIPIDEMIA: ICD-10-CM

## 2024-08-15 PROCEDURE — 1160F RVW MEDS BY RX/DR IN RCRD: CPT | Performed by: INTERNAL MEDICINE

## 2024-08-15 PROCEDURE — 1159F MED LIST DOCD IN RCRD: CPT | Performed by: INTERNAL MEDICINE

## 2024-08-15 PROCEDURE — 3079F DIAST BP 80-89 MM HG: CPT | Performed by: INTERNAL MEDICINE

## 2024-08-15 PROCEDURE — 99213 OFFICE O/P EST LOW 20 MIN: CPT | Performed by: INTERNAL MEDICINE

## 2024-08-15 PROCEDURE — 93000 ELECTROCARDIOGRAM COMPLETE: CPT | Performed by: INTERNAL MEDICINE

## 2024-08-15 PROCEDURE — 3074F SYST BP LT 130 MM HG: CPT | Performed by: INTERNAL MEDICINE

## 2024-08-15 RX ORDER — METOPROLOL SUCCINATE 100 MG/1
100 TABLET, EXTENDED RELEASE ORAL DAILY
Qty: 90 TABLET | Refills: 3 | Status: SHIPPED | OUTPATIENT
Start: 2024-08-15

## 2024-08-15 RX ORDER — TELMISARTAN 80 MG/1
80 TABLET ORAL DAILY
Qty: 90 TABLET | Refills: 3 | Status: SHIPPED | OUTPATIENT
Start: 2024-08-15

## 2024-08-15 RX ORDER — NITROGLYCERIN 0.4 MG/1
0.4 TABLET SUBLINGUAL AS NEEDED
Qty: 100 TABLET | Refills: 3 | Status: SHIPPED | OUTPATIENT
Start: 2024-08-15

## 2024-08-15 RX ORDER — ATORVASTATIN CALCIUM 80 MG/1
80 TABLET, FILM COATED ORAL DAILY
Qty: 90 TABLET | Refills: 3 | Status: SHIPPED | OUTPATIENT
Start: 2024-08-15

## 2024-08-15 RX ORDER — PRASUGREL 10 MG/1
10 TABLET, FILM COATED ORAL DAILY
Qty: 90 TABLET | Refills: 3 | Status: SHIPPED | OUTPATIENT
Start: 2024-08-15

## 2024-08-15 RX ORDER — EZETIMIBE 10 MG/1
10 TABLET ORAL DAILY
Qty: 90 TABLET | Refills: 3 | Status: SHIPPED | OUTPATIENT
Start: 2024-08-15

## 2024-08-15 NOTE — PROGRESS NOTES
CARDIOLOGY    Catrina Horn MD    ENCOUNTER DATE:  08/15/2024    Jonel Garza / 49 y.o. / male        CHIEF COMPLAINT / REASON FOR OFFICE VISIT     Follow-up (6 Month follow up/Hyperlipidemia/S/p cardiac cath/Hypertension/Coronary arteriosclerosis )      HISTORY OF PRESENT ILLNESS       HPI    Jonel Garza is a 49 y.o. 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  He has a history of diabetes, hypertension, and hyperlipidemia as well as a family history of coronary artery disease. He was having right-sided chest pain in the fall of 2014. He had an echocardiogram in 03/2014, which showed normal left ventricular function with an ejection fraction of 62% and no significant valvular heart disease. Because of his chest pain, he had a catheterization in 12/2014, which showed left main normal, left anterior descending artery 20% proximal lesion, circumflex nondominant with a 20% mid lesion, and right coronary artery dominant with a mid-99% lesion. His inferior wall was felt to be hypokinetic and ejection fraction of 45% to 50%. He had a Xience drug-eluting stent placed to the right coronary artery. He was having more chest pain in 05/2015 and underwent a stress echocardiogram 5/15, which showed mild left ventricular hypertrophy, normal left ventricular function with an ejection fraction of 60%, normal diastolic filling, and again no valvular heart disease. He exercised for 6 minutes and 59 seconds on the Adithya protocol and had no ST changes, and his stress echocardiogram images were normal. Because his chest pain continued, he went on and had a heart catheterization in 05/2015, which showed left main normal, left anterior descending artery 20% proximal lesion, and circumflex normal. Right coronary artery had a patent stent in the mid vessel and a 40% mid to distal lesion.  "Again, his inferior wall was felt to be hypokinetic with an ejection fraction of 45%.       I saw him back in October 2017.  He was complaining of a right-sided chest pain similar to what he experienced before his stent.  It resolved with nitroglycerin.  I recommended a heart catheterization.  That showed a 40% proximal LAD lesion, normal circumflex, right coronary artery is a patent stent and up to 30% disease.  There is an RV branch with a 40% lesion.  No intervention was performed.     When I saw him in April 2019 he was complaining of some atypical chest pain.  We went over the options as far as PET stress test but he chose to avoid it to avoid radiation.     I saw him in October 2019.  At that time he was complaining of a right-sided heaviness.  Nuclear stress test in October 2019 showed normal perfusion of the myocardium with no ischemia.  Ejection fraction 64%.     He had a PET stress test in December 2020 showing an ejection fraction of 66% and no evidence of ischemia or infarction.  I saw him in September 2022 when he was complaining of chest pain that felt like his anginal chest pain.  I sent him for a heart catheterization and he had overlapping Xience drug-eluting stents to a 90% mid LAD lesion.  He had a drug-eluting stent to the mid right coronary artery.  The circumflex and left main looks normal.  There was normal left ventricular size and function.    He is doing well.  He has been spending some time in [place] with aunts who may have breast cancer.  This got him a little bit out of his workout routine and feeling good with exercise.    VITAL SIGNS     Visit Vitals  /80   Pulse 81   Ht 188 cm (74\")   Wt (!) 151 kg (332 lb)   BMI 42.63 kg/m²         Wt Readings from Last 3 Encounters:   08/15/24 (!) 151 kg (332 lb)   05/18/24 (!) 150 kg (330 lb)   02/03/24 (!) 145 kg (320 lb)     Body mass index is 42.63 kg/m².      PHYSICAL EXAMINATION     Constitutional:       General: Not in acute " distress.  Neck:      Vascular: No carotid bruit or JVD.   Pulmonary:      Effort: Pulmonary effort is normal.      Breath sounds: Normal breath sounds.   Cardiovascular:      Normal rate. Regular rhythm.      Murmurs: There is no murmur.   Psychiatric:         Mood and Affect: Mood and affect normal.           REVIEWED DATA       ECG 12 Lead    Date/Time: 8/15/2024 10:53 AM  Performed by: Catrina Horn MD    Authorized by: Catrina Horn MD  Comparison: compared with previous ECG from 2/23/2024  Similar to previous ECG  Rhythm: sinus rhythm  BPM: 71  Conduction: conduction normal  ST Segments: ST segments normal  T Waves: T waves normal    Clinical impression: normal ECG            Lipid Panel          10/26/2023    10:06   Lipid Panel   Total Cholesterol 112    Triglycerides 65    HDL Cholesterol 40    VLDL Cholesterol 14    LDL Cholesterol  58    LDL/HDL Ratio 1.48        Lab Results   Component Value Date    GLUCOSE 147 (H) 02/03/2024    BUN 13 02/03/2024    CREATININE 0.70 (L) 02/03/2024     02/03/2024    K 3.9 02/03/2024     02/03/2024    CALCIUM 9.5 02/03/2024    PROTEINTOT 7.9 02/03/2024    ALBUMIN 4.1 02/03/2024    ALT 38 02/03/2024    AST 21 02/03/2024    ALKPHOS 112 02/03/2024    BILITOT 0.4 02/03/2024    GLOB 3.8 02/03/2024    AGRATIO 1.1 02/03/2024    BCR 18.6 02/03/2024    ANIONGAP 12.5 02/03/2024    EGFR 113.0 02/03/2024       ASSESSMENT & PLAN      Diagnosis Plan   1. Coronary arteriosclerosis in native artery        2. Essential hypertension        3. Other hyperlipidemia        4. Presence of stent in coronary artery        5. Type 2 diabetes mellitus with other circulatory complication, with long-term current use of insulin            1. Coronary artery disease with a stent to the mid right coronary artery in 12/2014.   - He had a repeat catheterization in 05/2015 for recurrent chest pain, which showed his stent was patent and he only had nonobstructive lesions noted.    - He  had another heart catheterization in November 2017 which continued to showed nonobstructive disease.  Stress test in 2020 was normal.  -He had 2 drug-eluting stents to the LAD and 1 drug-eluting stent to the right coronary artery in October 2022.  -Continue Effient, atorvastatin and Toprol-XL.  -Continue Imdur and sublingual nitroglycerin as needed.  -Normal LV function.  -He is doing well at this time so not going to order any testing or  2. Hypertension.  Well controlled.  3. Hyperlipidemia.  On atorvastatin 80mg and Zetia.  He has been on Praluent in addition to the above medicines for the last couple of months.  I reviewed the lipid panel that he had drawn this morning and his LDL was less than 3.  4. Diabetes.  On Jardiance as well as insulin.  Continue with exercise and weight loss.  5. Obstructive sleep apnea. Compliant with BiPAP.   6. Hypothyroid.   7. Obesity.  I encouraged a plant-based diet and continued exercise  8. Vascular screening. He underwent vascular screening in 2015 which was normal.    Follow-up in a year unless his symptoms    Orders Placed This Encounter   Procedures    ECG 12 Lead     This order was created via procedure documentation     Order Specific Question:   Release to patient     Answer:   Routine Release [[id number]]           MEDICATIONS         Discharge Medications            Accurate as of August 15, 2024 10:54 AM. If you have any questions, ask your nurse or doctor.                Continue These Medications        Instructions Start Date   Accu-Chek Guera Plus test strip  Generic drug: glucose blood   Use to test BG 5 times daily      Accu-Chek Softclix Lancets lancets   Use to test BG 5 times daily      ammonium lactate 12 % cream  Commonly known as: AMLACTIN   Topical, As Needed      aspirin 81 MG EC tablet   81 mg, Oral, Daily      atorvastatin 80 MG tablet  Commonly known as: LIPITOR   80 mg, Oral, Daily      benzoyl peroxide-erythromycin 5-3 % gel  Commonly known as:  BENZAMYCIN   Topical, As Needed      cetirizine 10 MG tablet  Commonly known as: zyrTEC   10 mg, Oral, As Needed, As neede      Clickfine Pen Needles 31G X 6 MM misc  Generic drug: Insulin Pen Needle   No dose, route, or frequency recorded.      Dexcom G7 Sensor misc       empagliflozin 25 MG tablet tablet  Commonly known as: JARDIANCE   25 mg, Oral, Daily      ezetimibe 10 MG tablet  Commonly known as: ZETIA   10 mg, Oral, Daily      finasteride 1 MG tablet  Commonly known as: PROPECIA   1 mg, Oral, Daily      fluticasone 50 MCG/ACT nasal spray  Commonly known as: FLONASE   1 spray, Nasal, As Needed      insulin lispro 100 UNIT/ML injection  Commonly known as: HumaLOG   Up to 150 units daily via pump      isosorbide mononitrate 30 MG 24 hr tablet  Commonly known as: IMDUR   TAKE 1 TABLET BY MOUTH TWICE  DAILY      ketoconazole 2 % shampoo  Commonly known as: NIZORAL   As Needed      meclizine 25 MG tablet  Commonly known as: ANTIVERT   25 mg, Oral, 3 Times Daily PRN      Metamucil [facility] Fiber 63 % powder  Generic drug: Psyllium   2 Times Daily      metoprolol succinate  MG 24 hr tablet  Commonly known as: TOPROL-XL   100 mg, Oral, Daily      nitroglycerin 0.4 MG SL tablet  Commonly known as: NITROSTAT   0.4 mg, Sublingual, As Needed,  - IF PAIN REMAINS AFTER 5 MIN CALL 911 AND REPEAT DOSE MAX 3 TABS IN 15 MINUTES      ondansetron ODT 8 MG disintegrating tablet  Commonly known as: ZOFRAN-ODT   8 mg, Sublingual, Every 8 Hours PRN      prasugrel 10 MG tablet  Commonly known as: EFFIENT   10 mg, Oral, Daily      Repatha SureClick solution auto-injector SureClick injection  Generic drug: Evolocumab   140 mg, Subcutaneous      SALINE MIST 0.65 % nasal spray  Generic drug: sodium chloride   As Needed      sertraline 100 MG tablet  Commonly known as: ZOLOFT   100 mg, Oral, Daily      Synthroid 125 MCG tablet  Generic drug: levothyroxine   Take 2 tablets by mouth Daily.      levothyroxine 125 MCG  tablet  Commonly known as: SYNTHROID, LEVOTHROID   2 Times Daily      telmisartan 80 MG tablet  Commonly known as: MICARDIS   80 mg, Oral, Daily      triamcinolone 0.5 % cream  Commonly known as: KENALOG   Apply to affected area BID - TID                 Catrina Horn MD  08/15/24  10:54 EDT    Part of this note may be an electronic transcription/translation of spoken language to printed text using the Dragon dictation system.

## 2024-08-26 ENCOUNTER — OFFICE (OUTPATIENT)
Dept: URBAN - METROPOLITAN AREA CLINIC 46 | Facility: CLINIC | Age: 49
End: 2024-08-26
Payer: COMMERCIAL

## 2024-08-26 ENCOUNTER — OFFICE (OUTPATIENT)
Age: 49
End: 2024-08-26
Payer: COMMERCIAL

## 2024-08-26 VITALS — HEIGHT: 74 IN | HEIGHT: 74 IN | HEIGHT: 74 IN | HEIGHT: 74 IN | HEIGHT: 74 IN | HEIGHT: 74 IN | HEIGHT: 74 IN

## 2024-08-26 DIAGNOSIS — R94.5 ABNORMAL RESULTS OF LIVER FUNCTION STUDIES: ICD-10-CM

## 2024-08-26 DIAGNOSIS — K75.81 NONALCOHOLIC STEATOHEPATITIS (NASH): ICD-10-CM

## 2024-08-26 PROCEDURE — 76981 USE PARENCHYMA: CPT | Performed by: INTERNAL MEDICINE

## 2024-08-29 ENCOUNTER — TRANSCRIBE ORDERS (OUTPATIENT)
Dept: ADMINISTRATIVE | Facility: HOSPITAL | Age: 49
End: 2024-08-29
Payer: COMMERCIAL

## 2024-08-29 DIAGNOSIS — D63.1 ANEMIA IN END-STAGE RENAL DISEASE: Primary | ICD-10-CM

## 2024-08-29 DIAGNOSIS — N18.6 ANEMIA IN END-STAGE RENAL DISEASE: Primary | ICD-10-CM

## 2024-08-29 DIAGNOSIS — K90.0 CELIAC DISEASE: ICD-10-CM

## 2024-09-05 ENCOUNTER — HOSPITAL ENCOUNTER (OUTPATIENT)
Dept: INFUSION THERAPY | Facility: HOSPITAL | Age: 49
Discharge: HOME | End: 2024-09-05
Payer: COMMERCIAL

## 2024-09-06 ENCOUNTER — HOSPITAL ENCOUNTER (OUTPATIENT)
Dept: INFUSION THERAPY | Facility: HOSPITAL | Age: 49
Discharge: HOME | End: 2024-09-06
Payer: COMMERCIAL

## 2024-09-06 VITALS
OXYGEN SATURATION: 100 % | DIASTOLIC BLOOD PRESSURE: 70 MMHG | SYSTOLIC BLOOD PRESSURE: 131 MMHG | TEMPERATURE: 96.4 F | HEART RATE: 69 BPM | RESPIRATION RATE: 20 BRPM

## 2024-09-06 DIAGNOSIS — D63.1 ANEMIA IN CHRONIC KIDNEY DISEASE, UNSPECIFIED CKD STAGE: Primary | ICD-10-CM

## 2024-09-06 DIAGNOSIS — N18.9 ANEMIA IN CHRONIC KIDNEY DISEASE, UNSPECIFIED CKD STAGE: Primary | ICD-10-CM

## 2024-09-06 PROCEDURE — 25010000002 NA FERRIC GLUC CPLX PER 12.5 MG: Performed by: INTERNAL MEDICINE

## 2024-09-06 PROCEDURE — 96365 THER/PROPH/DIAG IV INF INIT: CPT

## 2024-09-06 RX ORDER — DIPHENHYDRAMINE HYDROCHLORIDE 50 MG/ML
12.5 INJECTION INTRAMUSCULAR; INTRAVENOUS ONCE
Status: CANCELLED
Start: 2024-09-13 | End: 2024-09-13

## 2024-09-06 RX ORDER — ACETAMINOPHEN 325 MG/1
650 TABLET ORAL ONCE
Status: CANCELLED
Start: 2024-09-13 | End: 2024-09-13

## 2024-09-06 RX ORDER — METHYLPREDNISOLONE SODIUM SUCCINATE 125 MG/2ML
125 INJECTION, POWDER, LYOPHILIZED, FOR SOLUTION INTRAMUSCULAR; INTRAVENOUS ONCE AS NEEDED
Status: DISCONTINUED | OUTPATIENT
Start: 2024-09-06 | End: 2024-09-08 | Stop reason: HOSPADM

## 2024-09-06 RX ORDER — DIPHENHYDRAMINE HYDROCHLORIDE 50 MG/ML
12.5 INJECTION INTRAMUSCULAR; INTRAVENOUS ONCE
Status: DISCONTINUED | OUTPATIENT
Start: 2024-09-06 | End: 2024-09-08 | Stop reason: HOSPADM

## 2024-09-06 RX ORDER — ACETAMINOPHEN 325 MG/1
650 TABLET ORAL ONCE
Status: DISCONTINUED | OUTPATIENT
Start: 2024-09-06 | End: 2024-09-08 | Stop reason: HOSPADM

## 2024-09-06 RX ORDER — METHYLPREDNISOLONE SODIUM SUCCINATE 125 MG/2ML
125 INJECTION, POWDER, LYOPHILIZED, FOR SOLUTION INTRAMUSCULAR; INTRAVENOUS ONCE AS NEEDED
Status: CANCELLED
Start: 2024-09-13

## 2024-09-06 RX ADMIN — SODIUM CHLORIDE 125 MG: 9 INJECTION, SOLUTION INTRAVENOUS at 14:12

## 2024-09-10 RX ORDER — ISOSORBIDE MONONITRATE 30 MG/1
30 TABLET, EXTENDED RELEASE ORAL 2 TIMES DAILY
Qty: 180 TABLET | Refills: 3 | Status: SHIPPED | OUTPATIENT
Start: 2024-09-10

## 2024-09-13 ENCOUNTER — HOSPITAL ENCOUNTER (OUTPATIENT)
Dept: INFUSION THERAPY | Facility: HOSPITAL | Age: 49
Discharge: HOME | End: 2024-09-13
Payer: COMMERCIAL

## 2024-09-13 VITALS
OXYGEN SATURATION: 96 % | RESPIRATION RATE: 18 BRPM | HEART RATE: 59 BPM | SYSTOLIC BLOOD PRESSURE: 135 MMHG | DIASTOLIC BLOOD PRESSURE: 73 MMHG | TEMPERATURE: 96.6 F

## 2024-09-13 DIAGNOSIS — N18.9 ANEMIA IN CHRONIC KIDNEY DISEASE, UNSPECIFIED CKD STAGE: Primary | ICD-10-CM

## 2024-09-13 DIAGNOSIS — D63.1 ANEMIA IN CHRONIC KIDNEY DISEASE, UNSPECIFIED CKD STAGE: Primary | ICD-10-CM

## 2024-09-13 PROCEDURE — 25010000002 DIPHENHYDRAMINE PER 50 MG: Performed by: INTERNAL MEDICINE

## 2024-09-13 PROCEDURE — 25010000002 NA FERRIC GLUC CPLX PER 12.5 MG: Performed by: INTERNAL MEDICINE

## 2024-09-13 PROCEDURE — 96365 THER/PROPH/DIAG IV INF INIT: CPT

## 2024-09-13 PROCEDURE — 96375 TX/PRO/DX INJ NEW DRUG ADDON: CPT

## 2024-09-13 PROCEDURE — 96366 THER/PROPH/DIAG IV INF ADDON: CPT

## 2024-09-13 RX ORDER — METHYLPREDNISOLONE SODIUM SUCCINATE 125 MG/2ML
125 INJECTION, POWDER, LYOPHILIZED, FOR SOLUTION INTRAMUSCULAR; INTRAVENOUS ONCE AS NEEDED
Status: DISCONTINUED | OUTPATIENT
Start: 2024-09-13 | End: 2024-09-15 | Stop reason: HOSPADM

## 2024-09-13 RX ORDER — ACETAMINOPHEN 325 MG/1
650 TABLET ORAL ONCE
Status: CANCELLED
Start: 2024-09-20 | End: 2024-09-20

## 2024-09-13 RX ORDER — METHYLPREDNISOLONE SODIUM SUCCINATE 125 MG/2ML
125 INJECTION, POWDER, LYOPHILIZED, FOR SOLUTION INTRAMUSCULAR; INTRAVENOUS ONCE AS NEEDED
Status: CANCELLED
Start: 2024-09-20

## 2024-09-13 RX ORDER — ACETAMINOPHEN 325 MG/1
650 TABLET ORAL ONCE
Status: COMPLETED | OUTPATIENT
Start: 2024-09-13 | End: 2024-09-13

## 2024-09-13 RX ORDER — DIPHENHYDRAMINE HYDROCHLORIDE 50 MG/ML
12.5 INJECTION INTRAMUSCULAR; INTRAVENOUS ONCE
Status: CANCELLED
Start: 2024-09-20 | End: 2024-09-20

## 2024-09-13 RX ORDER — DIPHENHYDRAMINE HYDROCHLORIDE 50 MG/ML
12.5 INJECTION INTRAMUSCULAR; INTRAVENOUS ONCE
Status: COMPLETED | OUTPATIENT
Start: 2024-09-13 | End: 2024-09-13

## 2024-09-13 RX ADMIN — DIPHENHYDRAMINE HYDROCHLORIDE 12.5 MG: 50 INJECTION, SOLUTION INTRAMUSCULAR; INTRAVENOUS at 09:44

## 2024-09-13 RX ADMIN — ACETAMINOPHEN 325MG 650 MG: 325 TABLET ORAL at 09:38

## 2024-09-13 RX ADMIN — SODIUM CHLORIDE 125 MG: 9 INJECTION, SOLUTION INTRAVENOUS at 09:50

## 2024-09-18 RX ORDER — EZETIMIBE 10 MG/1
10 TABLET ORAL DAILY
Qty: 90 TABLET | Refills: 3 | Status: SHIPPED | OUTPATIENT
Start: 2024-09-18

## 2024-09-20 ENCOUNTER — HOSPITAL ENCOUNTER (OUTPATIENT)
Dept: INFUSION THERAPY | Facility: HOSPITAL | Age: 49
Discharge: HOME | End: 2024-09-20
Payer: COMMERCIAL

## 2024-09-20 VITALS
SYSTOLIC BLOOD PRESSURE: 147 MMHG | RESPIRATION RATE: 20 BRPM | HEART RATE: 62 BPM | DIASTOLIC BLOOD PRESSURE: 82 MMHG | OXYGEN SATURATION: 96 % | TEMPERATURE: 97 F

## 2024-09-20 DIAGNOSIS — N18.9 ANEMIA IN CHRONIC KIDNEY DISEASE, UNSPECIFIED CKD STAGE: Primary | ICD-10-CM

## 2024-09-20 DIAGNOSIS — D63.1 ANEMIA IN CHRONIC KIDNEY DISEASE, UNSPECIFIED CKD STAGE: Primary | ICD-10-CM

## 2024-09-20 PROCEDURE — 96375 TX/PRO/DX INJ NEW DRUG ADDON: CPT

## 2024-09-20 PROCEDURE — 25010000002 DIPHENHYDRAMINE PER 50 MG: Performed by: INTERNAL MEDICINE

## 2024-09-20 PROCEDURE — 25010000002 NA FERRIC GLUC CPLX PER 12.5 MG: Performed by: INTERNAL MEDICINE

## 2024-09-20 PROCEDURE — 96365 THER/PROPH/DIAG IV INF INIT: CPT

## 2024-09-20 RX ORDER — METHYLPREDNISOLONE SODIUM SUCCINATE 125 MG/2ML
125 INJECTION, POWDER, LYOPHILIZED, FOR SOLUTION INTRAMUSCULAR; INTRAVENOUS ONCE AS NEEDED
Status: DISCONTINUED | OUTPATIENT
Start: 2024-09-20 | End: 2024-09-22 | Stop reason: HOSPADM

## 2024-09-20 RX ORDER — DIPHENHYDRAMINE HYDROCHLORIDE 50 MG/ML
12.5 INJECTION INTRAMUSCULAR; INTRAVENOUS ONCE
Status: CANCELLED
Start: 2024-09-27 | End: 2024-09-27

## 2024-09-20 RX ORDER — ACETAMINOPHEN 325 MG/1
650 TABLET ORAL ONCE
Status: CANCELLED
Start: 2024-09-27 | End: 2024-09-27

## 2024-09-20 RX ORDER — DIPHENHYDRAMINE HYDROCHLORIDE 50 MG/ML
12.5 INJECTION INTRAMUSCULAR; INTRAVENOUS ONCE
Status: COMPLETED | OUTPATIENT
Start: 2024-09-20 | End: 2024-09-20

## 2024-09-20 RX ORDER — METHYLPREDNISOLONE SODIUM SUCCINATE 125 MG/2ML
125 INJECTION, POWDER, LYOPHILIZED, FOR SOLUTION INTRAMUSCULAR; INTRAVENOUS ONCE AS NEEDED
Status: CANCELLED
Start: 2024-09-27

## 2024-09-20 RX ORDER — ACETAMINOPHEN 325 MG/1
650 TABLET ORAL ONCE
Status: COMPLETED | OUTPATIENT
Start: 2024-09-20 | End: 2024-09-20

## 2024-09-20 RX ADMIN — SODIUM CHLORIDE 125 MG: 9 INJECTION, SOLUTION INTRAVENOUS at 08:59

## 2024-09-20 RX ADMIN — DIPHENHYDRAMINE HYDROCHLORIDE 12.5 MG: 50 INJECTION, SOLUTION INTRAMUSCULAR; INTRAVENOUS at 08:28

## 2024-09-20 RX ADMIN — ACETAMINOPHEN 325MG 650 MG: 325 TABLET ORAL at 08:25

## 2024-09-27 ENCOUNTER — HOSPITAL ENCOUNTER (OUTPATIENT)
Dept: INFUSION THERAPY | Facility: HOSPITAL | Age: 49
Discharge: HOME | End: 2024-09-27
Admitting: INTERNAL MEDICINE
Payer: COMMERCIAL

## 2024-09-27 VITALS
TEMPERATURE: 96.8 F | HEART RATE: 64 BPM | RESPIRATION RATE: 18 BRPM | OXYGEN SATURATION: 95 % | DIASTOLIC BLOOD PRESSURE: 82 MMHG | SYSTOLIC BLOOD PRESSURE: 146 MMHG

## 2024-09-27 DIAGNOSIS — N18.9 ANEMIA IN CHRONIC KIDNEY DISEASE, UNSPECIFIED CKD STAGE: Primary | ICD-10-CM

## 2024-09-27 DIAGNOSIS — D63.1 ANEMIA IN CHRONIC KIDNEY DISEASE, UNSPECIFIED CKD STAGE: Primary | ICD-10-CM

## 2024-09-27 PROCEDURE — 25010000002 NA FERRIC GLUC CPLX PER 12.5 MG: Performed by: INTERNAL MEDICINE

## 2024-09-27 PROCEDURE — 96375 TX/PRO/DX INJ NEW DRUG ADDON: CPT

## 2024-09-27 PROCEDURE — 96374 THER/PROPH/DIAG INJ IV PUSH: CPT

## 2024-09-27 PROCEDURE — 25010000002 DIPHENHYDRAMINE PER 50 MG: Performed by: INTERNAL MEDICINE

## 2024-09-27 PROCEDURE — 96365 THER/PROPH/DIAG IV INF INIT: CPT

## 2024-09-27 RX ORDER — METHYLPREDNISOLONE SODIUM SUCCINATE 125 MG/2ML
125 INJECTION, POWDER, LYOPHILIZED, FOR SOLUTION INTRAMUSCULAR; INTRAVENOUS ONCE AS NEEDED
Status: DISCONTINUED | OUTPATIENT
Start: 2024-09-27 | End: 2024-09-29 | Stop reason: HOSPADM

## 2024-09-27 RX ORDER — METHYLPREDNISOLONE SODIUM SUCCINATE 125 MG/2ML
125 INJECTION, POWDER, LYOPHILIZED, FOR SOLUTION INTRAMUSCULAR; INTRAVENOUS ONCE AS NEEDED
Start: 2024-10-04

## 2024-09-27 RX ORDER — ACETAMINOPHEN 325 MG/1
650 TABLET ORAL ONCE
Start: 2024-10-04 | End: 2024-10-04

## 2024-09-27 RX ORDER — DIPHENHYDRAMINE HYDROCHLORIDE 50 MG/ML
12.5 INJECTION INTRAMUSCULAR; INTRAVENOUS ONCE
Start: 2024-10-04 | End: 2024-10-04

## 2024-09-27 RX ORDER — DIPHENHYDRAMINE HYDROCHLORIDE 50 MG/ML
12.5 INJECTION INTRAMUSCULAR; INTRAVENOUS ONCE
Status: COMPLETED | OUTPATIENT
Start: 2024-09-27 | End: 2024-09-27

## 2024-09-27 RX ORDER — ACETAMINOPHEN 325 MG/1
650 TABLET ORAL ONCE
Status: COMPLETED | OUTPATIENT
Start: 2024-09-27 | End: 2024-09-27

## 2024-09-27 RX ADMIN — DIPHENHYDRAMINE HYDROCHLORIDE 12.5 MG: 50 INJECTION, SOLUTION INTRAMUSCULAR; INTRAVENOUS at 08:42

## 2024-09-27 RX ADMIN — SODIUM CHLORIDE 125 MG: 9 INJECTION, SOLUTION INTRAVENOUS at 08:53

## 2024-09-27 RX ADMIN — ACETAMINOPHEN 325MG 650 MG: 325 TABLET ORAL at 08:29

## 2024-10-03 RX ORDER — TELMISARTAN 80 MG/1
80 TABLET ORAL DAILY
Qty: 90 TABLET | Refills: 3 | Status: SHIPPED | OUTPATIENT
Start: 2024-10-03

## 2024-10-04 ENCOUNTER — HOSPITAL ENCOUNTER (OUTPATIENT)
Dept: INFUSION THERAPY | Facility: HOSPITAL | Age: 49
Discharge: HOME | End: 2024-10-04
Payer: COMMERCIAL

## 2024-11-01 ENCOUNTER — HOSPITAL ENCOUNTER (OUTPATIENT)
Dept: INFUSION THERAPY | Facility: HOSPITAL | Age: 49
Discharge: HOME | End: 2024-11-01
Payer: COMMERCIAL

## 2024-11-01 VITALS
RESPIRATION RATE: 20 BRPM | TEMPERATURE: 97.1 F | HEART RATE: 68 BPM | OXYGEN SATURATION: 99 % | SYSTOLIC BLOOD PRESSURE: 144 MMHG | DIASTOLIC BLOOD PRESSURE: 72 MMHG

## 2024-11-01 DIAGNOSIS — N18.9 ANEMIA IN CHRONIC KIDNEY DISEASE, UNSPECIFIED CKD STAGE: Primary | ICD-10-CM

## 2024-11-01 DIAGNOSIS — D63.1 ANEMIA IN CHRONIC KIDNEY DISEASE, UNSPECIFIED CKD STAGE: Primary | ICD-10-CM

## 2024-11-01 PROCEDURE — 96375 TX/PRO/DX INJ NEW DRUG ADDON: CPT

## 2024-11-01 PROCEDURE — 96374 THER/PROPH/DIAG INJ IV PUSH: CPT

## 2024-11-01 PROCEDURE — 96365 THER/PROPH/DIAG IV INF INIT: CPT

## 2024-11-01 PROCEDURE — 25010000002 DIPHENHYDRAMINE PER 50 MG: Performed by: INTERNAL MEDICINE

## 2024-11-01 PROCEDURE — 25010000002 NA FERRIC GLUC CPLX PER 12.5 MG: Performed by: INTERNAL MEDICINE

## 2024-11-01 RX ORDER — ACETAMINOPHEN 325 MG/1
650 TABLET ORAL ONCE
Start: 2024-11-01 | End: 2024-11-01

## 2024-11-01 RX ORDER — ACETAMINOPHEN 325 MG/1
650 TABLET ORAL ONCE
Status: COMPLETED | OUTPATIENT
Start: 2024-11-01 | End: 2024-11-01

## 2024-11-01 RX ORDER — DIPHENHYDRAMINE HYDROCHLORIDE 50 MG/ML
12.5 INJECTION INTRAMUSCULAR; INTRAVENOUS ONCE
Status: COMPLETED | OUTPATIENT
Start: 2024-11-01 | End: 2024-11-01

## 2024-11-01 RX ORDER — METHYLPREDNISOLONE SODIUM SUCCINATE 125 MG/2ML
125 INJECTION, POWDER, LYOPHILIZED, FOR SOLUTION INTRAMUSCULAR; INTRAVENOUS ONCE AS NEEDED
Status: DISCONTINUED | OUTPATIENT
Start: 2024-11-01 | End: 2024-11-03 | Stop reason: HOSPADM

## 2024-11-01 RX ORDER — DIPHENHYDRAMINE HYDROCHLORIDE 50 MG/ML
12.5 INJECTION INTRAMUSCULAR; INTRAVENOUS ONCE
Start: 2024-11-01 | End: 2024-11-01

## 2024-11-01 RX ORDER — METHYLPREDNISOLONE SODIUM SUCCINATE 125 MG/2ML
125 INJECTION, POWDER, LYOPHILIZED, FOR SOLUTION INTRAMUSCULAR; INTRAVENOUS ONCE AS NEEDED
Start: 2024-11-01

## 2024-11-01 RX ADMIN — SODIUM CHLORIDE 125 MG: 9 INJECTION, SOLUTION INTRAVENOUS at 08:41

## 2024-11-01 RX ADMIN — DIPHENHYDRAMINE HYDROCHLORIDE 12.5 MG: 50 INJECTION, SOLUTION INTRAMUSCULAR; INTRAVENOUS at 08:21

## 2024-11-01 RX ADMIN — ACETAMINOPHEN 325MG 650 MG: 325 TABLET ORAL at 08:15

## 2024-11-14 ENCOUNTER — TELEPHONE (OUTPATIENT)
Dept: CARDIOLOGY | Facility: CLINIC | Age: 49
End: 2024-11-14
Payer: COMMERCIAL

## 2024-11-14 DIAGNOSIS — Z98.890 S/P CARDIAC CATH: ICD-10-CM

## 2024-11-14 DIAGNOSIS — I10 ESSENTIAL HYPERTENSION: ICD-10-CM

## 2024-11-14 DIAGNOSIS — G47.33 OBSTRUCTIVE SLEEP APNEA SYNDROME: ICD-10-CM

## 2024-11-14 DIAGNOSIS — E78.49 OTHER HYPERLIPIDEMIA: ICD-10-CM

## 2024-11-14 DIAGNOSIS — E11.59 TYPE 2 DIABETES MELLITUS WITH OTHER CIRCULATORY COMPLICATION, WITH LONG-TERM CURRENT USE OF INSULIN: ICD-10-CM

## 2024-11-14 DIAGNOSIS — Z79.4 TYPE 2 DIABETES MELLITUS WITH OTHER CIRCULATORY COMPLICATION, WITH LONG-TERM CURRENT USE OF INSULIN: ICD-10-CM

## 2024-11-14 DIAGNOSIS — I25.10 CORONARY ARTERIOSCLEROSIS IN NATIVE ARTERY: Primary | ICD-10-CM

## 2024-11-14 DIAGNOSIS — Z51.81 ENCOUNTER FOR THERAPEUTIC DRUG LEVEL MONITORING: ICD-10-CM

## 2024-11-21 ENCOUNTER — LAB (OUTPATIENT)
Dept: LAB | Facility: HOSPITAL | Age: 49
End: 2024-11-21
Payer: COMMERCIAL

## 2024-11-21 DIAGNOSIS — Z79.4 TYPE 2 DIABETES MELLITUS WITH OTHER CIRCULATORY COMPLICATION, WITH LONG-TERM CURRENT USE OF INSULIN: ICD-10-CM

## 2024-11-21 DIAGNOSIS — E11.59 TYPE 2 DIABETES MELLITUS WITH OTHER CIRCULATORY COMPLICATION, WITH LONG-TERM CURRENT USE OF INSULIN: ICD-10-CM

## 2024-11-21 LAB
BILIRUB UR QL STRIP: NEGATIVE
CLARITY UR: CLEAR
COLOR UR: YELLOW
GLUCOSE UR STRIP-MCNC: ABNORMAL MG/DL
HGB UR QL STRIP.AUTO: NEGATIVE
KETONES UR QL STRIP: NEGATIVE
LEUKOCYTE ESTERASE UR QL STRIP.AUTO: NEGATIVE
NITRITE UR QL STRIP: NEGATIVE
PH UR STRIP.AUTO: 7.5 [PH] (ref 5–8)
PROT UR QL STRIP: NEGATIVE
SP GR UR STRIP: 1.01 (ref 1–1.03)
UROBILINOGEN UR QL STRIP: ABNORMAL

## 2024-11-21 PROCEDURE — 80053 COMPREHEN METABOLIC PANEL: CPT | Performed by: INTERNAL MEDICINE

## 2024-11-21 PROCEDURE — 81003 URINALYSIS AUTO W/O SCOPE: CPT

## 2024-11-21 PROCEDURE — 84443 ASSAY THYROID STIM HORMONE: CPT | Performed by: INTERNAL MEDICINE

## 2024-11-21 PROCEDURE — 80061 LIPID PANEL: CPT | Performed by: INTERNAL MEDICINE

## 2024-11-21 PROCEDURE — 85027 COMPLETE CBC AUTOMATED: CPT | Performed by: INTERNAL MEDICINE

## 2024-11-21 PROCEDURE — 83036 HEMOGLOBIN GLYCOSYLATED A1C: CPT | Performed by: INTERNAL MEDICINE

## 2024-11-22 VITALS
HEART RATE: 79 BPM | SYSTOLIC BLOOD PRESSURE: 134 MMHG | HEART RATE: 72 BPM | RESPIRATION RATE: 20 BRPM | HEART RATE: 72 BPM | HEART RATE: 77 BPM | SYSTOLIC BLOOD PRESSURE: 129 MMHG | SYSTOLIC BLOOD PRESSURE: 146 MMHG | SYSTOLIC BLOOD PRESSURE: 161 MMHG | SYSTOLIC BLOOD PRESSURE: 130 MMHG | DIASTOLIC BLOOD PRESSURE: 94 MMHG | HEART RATE: 73 BPM | HEART RATE: 77 BPM | RESPIRATION RATE: 19 BRPM | HEART RATE: 71 BPM | OXYGEN SATURATION: 95 % | SYSTOLIC BLOOD PRESSURE: 161 MMHG | RESPIRATION RATE: 20 BRPM | DIASTOLIC BLOOD PRESSURE: 65 MMHG | DIASTOLIC BLOOD PRESSURE: 71 MMHG | RESPIRATION RATE: 20 BRPM | HEART RATE: 70 BPM | RESPIRATION RATE: 12 BRPM | RESPIRATION RATE: 26 BRPM | DIASTOLIC BLOOD PRESSURE: 71 MMHG | DIASTOLIC BLOOD PRESSURE: 75 MMHG | SYSTOLIC BLOOD PRESSURE: 125 MMHG | HEART RATE: 79 BPM | DIASTOLIC BLOOD PRESSURE: 65 MMHG | TEMPERATURE: 97.1 F | WEIGHT: 315 LBS | TEMPERATURE: 97.1 F | HEART RATE: 69 BPM | DIASTOLIC BLOOD PRESSURE: 73 MMHG | DIASTOLIC BLOOD PRESSURE: 71 MMHG | HEART RATE: 72 BPM | OXYGEN SATURATION: 100 % | SYSTOLIC BLOOD PRESSURE: 150 MMHG | SYSTOLIC BLOOD PRESSURE: 124 MMHG | DIASTOLIC BLOOD PRESSURE: 82 MMHG | OXYGEN SATURATION: 97 % | RESPIRATION RATE: 18 BRPM | RESPIRATION RATE: 26 BRPM | OXYGEN SATURATION: 100 % | DIASTOLIC BLOOD PRESSURE: 65 MMHG | DIASTOLIC BLOOD PRESSURE: 72 MMHG | SYSTOLIC BLOOD PRESSURE: 115 MMHG | RESPIRATION RATE: 21 BRPM | SYSTOLIC BLOOD PRESSURE: 146 MMHG | HEART RATE: 75 BPM | DIASTOLIC BLOOD PRESSURE: 82 MMHG | OXYGEN SATURATION: 99 % | DIASTOLIC BLOOD PRESSURE: 75 MMHG | DIASTOLIC BLOOD PRESSURE: 74 MMHG | RESPIRATION RATE: 12 BRPM | HEART RATE: 73 BPM | RESPIRATION RATE: 18 BRPM | DIASTOLIC BLOOD PRESSURE: 74 MMHG | DIASTOLIC BLOOD PRESSURE: 73 MMHG | OXYGEN SATURATION: 97 % | SYSTOLIC BLOOD PRESSURE: 122 MMHG | SYSTOLIC BLOOD PRESSURE: 125 MMHG | HEART RATE: 71 BPM | SYSTOLIC BLOOD PRESSURE: 122 MMHG | OXYGEN SATURATION: 100 % | WEIGHT: 315 LBS | HEART RATE: 77 BPM | DIASTOLIC BLOOD PRESSURE: 74 MMHG | DIASTOLIC BLOOD PRESSURE: 68 MMHG | DIASTOLIC BLOOD PRESSURE: 62 MMHG | HEART RATE: 73 BPM | OXYGEN SATURATION: 99 % | HEART RATE: 71 BPM | SYSTOLIC BLOOD PRESSURE: 138 MMHG | SYSTOLIC BLOOD PRESSURE: 129 MMHG | RESPIRATION RATE: 20 BRPM | SYSTOLIC BLOOD PRESSURE: 120 MMHG | DIASTOLIC BLOOD PRESSURE: 65 MMHG | OXYGEN SATURATION: 100 % | HEART RATE: 69 BPM | SYSTOLIC BLOOD PRESSURE: 138 MMHG | HEART RATE: 71 BPM | SYSTOLIC BLOOD PRESSURE: 150 MMHG | SYSTOLIC BLOOD PRESSURE: 120 MMHG | SYSTOLIC BLOOD PRESSURE: 150 MMHG | RESPIRATION RATE: 26 BRPM | SYSTOLIC BLOOD PRESSURE: 124 MMHG | HEART RATE: 72 BPM | RESPIRATION RATE: 19 BRPM | DIASTOLIC BLOOD PRESSURE: 82 MMHG | WEIGHT: 315 LBS | OXYGEN SATURATION: 97 % | RESPIRATION RATE: 20 BRPM | DIASTOLIC BLOOD PRESSURE: 69 MMHG | DIASTOLIC BLOOD PRESSURE: 62 MMHG | RESPIRATION RATE: 16 BRPM | HEART RATE: 79 BPM | OXYGEN SATURATION: 96 % | DIASTOLIC BLOOD PRESSURE: 75 MMHG | DIASTOLIC BLOOD PRESSURE: 82 MMHG | DIASTOLIC BLOOD PRESSURE: 70 MMHG | SYSTOLIC BLOOD PRESSURE: 129 MMHG | OXYGEN SATURATION: 99 % | RESPIRATION RATE: 21 BRPM | DIASTOLIC BLOOD PRESSURE: 75 MMHG | HEART RATE: 79 BPM | SYSTOLIC BLOOD PRESSURE: 120 MMHG | DIASTOLIC BLOOD PRESSURE: 72 MMHG | DIASTOLIC BLOOD PRESSURE: 94 MMHG | DIASTOLIC BLOOD PRESSURE: 94 MMHG | SYSTOLIC BLOOD PRESSURE: 122 MMHG | OXYGEN SATURATION: 99 % | RESPIRATION RATE: 26 BRPM | SYSTOLIC BLOOD PRESSURE: 132 MMHG | SYSTOLIC BLOOD PRESSURE: 122 MMHG | DIASTOLIC BLOOD PRESSURE: 71 MMHG | SYSTOLIC BLOOD PRESSURE: 132 MMHG | SYSTOLIC BLOOD PRESSURE: 161 MMHG | SYSTOLIC BLOOD PRESSURE: 134 MMHG | HEART RATE: 77 BPM | DIASTOLIC BLOOD PRESSURE: 74 MMHG | HEART RATE: 75 BPM | SYSTOLIC BLOOD PRESSURE: 130 MMHG | DIASTOLIC BLOOD PRESSURE: 75 MMHG | SYSTOLIC BLOOD PRESSURE: 161 MMHG | WEIGHT: 315 LBS | SYSTOLIC BLOOD PRESSURE: 129 MMHG | SYSTOLIC BLOOD PRESSURE: 150 MMHG | SYSTOLIC BLOOD PRESSURE: 115 MMHG | OXYGEN SATURATION: 96 % | DIASTOLIC BLOOD PRESSURE: 70 MMHG | RESPIRATION RATE: 16 BRPM | HEART RATE: 70 BPM | SYSTOLIC BLOOD PRESSURE: 146 MMHG | SYSTOLIC BLOOD PRESSURE: 130 MMHG | DIASTOLIC BLOOD PRESSURE: 82 MMHG | DIASTOLIC BLOOD PRESSURE: 72 MMHG | SYSTOLIC BLOOD PRESSURE: 132 MMHG | DIASTOLIC BLOOD PRESSURE: 74 MMHG | RESPIRATION RATE: 16 BRPM | SYSTOLIC BLOOD PRESSURE: 122 MMHG | HEART RATE: 70 BPM | SYSTOLIC BLOOD PRESSURE: 134 MMHG | WEIGHT: 315 LBS | DIASTOLIC BLOOD PRESSURE: 94 MMHG | HEART RATE: 75 BPM | SYSTOLIC BLOOD PRESSURE: 124 MMHG | SYSTOLIC BLOOD PRESSURE: 130 MMHG | SYSTOLIC BLOOD PRESSURE: 146 MMHG | HEART RATE: 75 BPM | DIASTOLIC BLOOD PRESSURE: 82 MMHG | HEART RATE: 69 BPM | DIASTOLIC BLOOD PRESSURE: 75 MMHG | RESPIRATION RATE: 16 BRPM | OXYGEN SATURATION: 100 % | HEART RATE: 77 BPM | DIASTOLIC BLOOD PRESSURE: 82 MMHG | SYSTOLIC BLOOD PRESSURE: 134 MMHG | HEART RATE: 70 BPM | DIASTOLIC BLOOD PRESSURE: 71 MMHG | HEART RATE: 79 BPM | HEART RATE: 69 BPM | SYSTOLIC BLOOD PRESSURE: 130 MMHG | RESPIRATION RATE: 26 BRPM | SYSTOLIC BLOOD PRESSURE: 138 MMHG | RESPIRATION RATE: 22 BRPM | RESPIRATION RATE: 12 BRPM | DIASTOLIC BLOOD PRESSURE: 62 MMHG | SYSTOLIC BLOOD PRESSURE: 124 MMHG | OXYGEN SATURATION: 95 % | TEMPERATURE: 97.9 F | SYSTOLIC BLOOD PRESSURE: 115 MMHG | DIASTOLIC BLOOD PRESSURE: 68 MMHG | WEIGHT: 315 LBS | DIASTOLIC BLOOD PRESSURE: 69 MMHG | DIASTOLIC BLOOD PRESSURE: 72 MMHG | DIASTOLIC BLOOD PRESSURE: 65 MMHG | TEMPERATURE: 97.1 F | HEART RATE: 72 BPM | SYSTOLIC BLOOD PRESSURE: 138 MMHG | TEMPERATURE: 97.9 F | DIASTOLIC BLOOD PRESSURE: 73 MMHG | SYSTOLIC BLOOD PRESSURE: 146 MMHG | RESPIRATION RATE: 22 BRPM | OXYGEN SATURATION: 96 % | DIASTOLIC BLOOD PRESSURE: 70 MMHG | RESPIRATION RATE: 16 BRPM | RESPIRATION RATE: 22 BRPM | OXYGEN SATURATION: 96 % | HEART RATE: 69 BPM | DIASTOLIC BLOOD PRESSURE: 65 MMHG | DIASTOLIC BLOOD PRESSURE: 71 MMHG | SYSTOLIC BLOOD PRESSURE: 132 MMHG | RESPIRATION RATE: 20 BRPM | HEIGHT: 74 IN | RESPIRATION RATE: 18 BRPM | SYSTOLIC BLOOD PRESSURE: 124 MMHG | HEART RATE: 77 BPM | DIASTOLIC BLOOD PRESSURE: 94 MMHG | OXYGEN SATURATION: 100 % | DIASTOLIC BLOOD PRESSURE: 72 MMHG | RESPIRATION RATE: 22 BRPM | DIASTOLIC BLOOD PRESSURE: 62 MMHG | SYSTOLIC BLOOD PRESSURE: 124 MMHG | OXYGEN SATURATION: 96 % | RESPIRATION RATE: 12 BRPM | OXYGEN SATURATION: 99 % | TEMPERATURE: 97.9 F | RESPIRATION RATE: 22 BRPM | OXYGEN SATURATION: 96 % | DIASTOLIC BLOOD PRESSURE: 73 MMHG | HEART RATE: 77 BPM | RESPIRATION RATE: 16 BRPM | HEART RATE: 75 BPM | DIASTOLIC BLOOD PRESSURE: 69 MMHG | SYSTOLIC BLOOD PRESSURE: 115 MMHG | SYSTOLIC BLOOD PRESSURE: 125 MMHG | HEART RATE: 79 BPM | SYSTOLIC BLOOD PRESSURE: 132 MMHG | SYSTOLIC BLOOD PRESSURE: 129 MMHG | DIASTOLIC BLOOD PRESSURE: 73 MMHG | RESPIRATION RATE: 22 BRPM | SYSTOLIC BLOOD PRESSURE: 138 MMHG | HEART RATE: 73 BPM | HEART RATE: 75 BPM | DIASTOLIC BLOOD PRESSURE: 68 MMHG | HEIGHT: 74 IN | RESPIRATION RATE: 19 BRPM | HEART RATE: 70 BPM | WEIGHT: 315 LBS | OXYGEN SATURATION: 96 % | HEART RATE: 70 BPM | RESPIRATION RATE: 12 BRPM | RESPIRATION RATE: 21 BRPM | RESPIRATION RATE: 18 BRPM | SYSTOLIC BLOOD PRESSURE: 125 MMHG | DIASTOLIC BLOOD PRESSURE: 74 MMHG | DIASTOLIC BLOOD PRESSURE: 69 MMHG | HEART RATE: 69 BPM | RESPIRATION RATE: 21 BRPM | SYSTOLIC BLOOD PRESSURE: 150 MMHG | DIASTOLIC BLOOD PRESSURE: 71 MMHG | RESPIRATION RATE: 18 BRPM | TEMPERATURE: 97.1 F | OXYGEN SATURATION: 97 % | HEIGHT: 74 IN | HEIGHT: 74 IN | RESPIRATION RATE: 18 BRPM | DIASTOLIC BLOOD PRESSURE: 70 MMHG | SYSTOLIC BLOOD PRESSURE: 115 MMHG | DIASTOLIC BLOOD PRESSURE: 62 MMHG | SYSTOLIC BLOOD PRESSURE: 122 MMHG | OXYGEN SATURATION: 95 % | SYSTOLIC BLOOD PRESSURE: 150 MMHG | SYSTOLIC BLOOD PRESSURE: 150 MMHG | OXYGEN SATURATION: 97 % | SYSTOLIC BLOOD PRESSURE: 115 MMHG | RESPIRATION RATE: 16 BRPM | HEIGHT: 74 IN | DIASTOLIC BLOOD PRESSURE: 69 MMHG | SYSTOLIC BLOOD PRESSURE: 134 MMHG | DIASTOLIC BLOOD PRESSURE: 62 MMHG | SYSTOLIC BLOOD PRESSURE: 122 MMHG | SYSTOLIC BLOOD PRESSURE: 132 MMHG | TEMPERATURE: 97.1 F | OXYGEN SATURATION: 99 % | SYSTOLIC BLOOD PRESSURE: 115 MMHG | SYSTOLIC BLOOD PRESSURE: 161 MMHG | SYSTOLIC BLOOD PRESSURE: 120 MMHG | HEART RATE: 71 BPM | OXYGEN SATURATION: 97 % | HEIGHT: 74 IN | RESPIRATION RATE: 20 BRPM | HEART RATE: 73 BPM | DIASTOLIC BLOOD PRESSURE: 70 MMHG | SYSTOLIC BLOOD PRESSURE: 134 MMHG | DIASTOLIC BLOOD PRESSURE: 68 MMHG | SYSTOLIC BLOOD PRESSURE: 161 MMHG | SYSTOLIC BLOOD PRESSURE: 125 MMHG | SYSTOLIC BLOOD PRESSURE: 146 MMHG | OXYGEN SATURATION: 100 % | SYSTOLIC BLOOD PRESSURE: 120 MMHG | TEMPERATURE: 97.9 F | SYSTOLIC BLOOD PRESSURE: 146 MMHG | DIASTOLIC BLOOD PRESSURE: 65 MMHG | RESPIRATION RATE: 19 BRPM | SYSTOLIC BLOOD PRESSURE: 161 MMHG | SYSTOLIC BLOOD PRESSURE: 124 MMHG | DIASTOLIC BLOOD PRESSURE: 68 MMHG | SYSTOLIC BLOOD PRESSURE: 138 MMHG | OXYGEN SATURATION: 95 % | OXYGEN SATURATION: 95 % | HEART RATE: 72 BPM | OXYGEN SATURATION: 95 % | SYSTOLIC BLOOD PRESSURE: 120 MMHG | HEART RATE: 71 BPM | OXYGEN SATURATION: 97 % | HEART RATE: 71 BPM | HEART RATE: 70 BPM | SYSTOLIC BLOOD PRESSURE: 130 MMHG | DIASTOLIC BLOOD PRESSURE: 72 MMHG | SYSTOLIC BLOOD PRESSURE: 129 MMHG | RESPIRATION RATE: 18 BRPM | RESPIRATION RATE: 12 BRPM | SYSTOLIC BLOOD PRESSURE: 125 MMHG | SYSTOLIC BLOOD PRESSURE: 132 MMHG | TEMPERATURE: 97.9 F | SYSTOLIC BLOOD PRESSURE: 125 MMHG | HEART RATE: 72 BPM | RESPIRATION RATE: 26 BRPM | HEIGHT: 74 IN | DIASTOLIC BLOOD PRESSURE: 70 MMHG | DIASTOLIC BLOOD PRESSURE: 94 MMHG | DIASTOLIC BLOOD PRESSURE: 69 MMHG | SYSTOLIC BLOOD PRESSURE: 138 MMHG | DIASTOLIC BLOOD PRESSURE: 62 MMHG | TEMPERATURE: 97.1 F | HEART RATE: 75 BPM | RESPIRATION RATE: 26 BRPM | HEART RATE: 73 BPM | RESPIRATION RATE: 19 BRPM | DIASTOLIC BLOOD PRESSURE: 68 MMHG | DIASTOLIC BLOOD PRESSURE: 70 MMHG | TEMPERATURE: 97.9 F | SYSTOLIC BLOOD PRESSURE: 120 MMHG | HEART RATE: 69 BPM | SYSTOLIC BLOOD PRESSURE: 134 MMHG | RESPIRATION RATE: 22 BRPM | DIASTOLIC BLOOD PRESSURE: 73 MMHG | DIASTOLIC BLOOD PRESSURE: 94 MMHG | RESPIRATION RATE: 19 BRPM | OXYGEN SATURATION: 95 % | DIASTOLIC BLOOD PRESSURE: 72 MMHG | HEART RATE: 79 BPM | DIASTOLIC BLOOD PRESSURE: 73 MMHG | OXYGEN SATURATION: 99 % | DIASTOLIC BLOOD PRESSURE: 69 MMHG | RESPIRATION RATE: 21 BRPM | RESPIRATION RATE: 21 BRPM | RESPIRATION RATE: 21 BRPM | TEMPERATURE: 97.9 F | DIASTOLIC BLOOD PRESSURE: 68 MMHG | DIASTOLIC BLOOD PRESSURE: 74 MMHG | SYSTOLIC BLOOD PRESSURE: 130 MMHG | HEART RATE: 73 BPM | SYSTOLIC BLOOD PRESSURE: 129 MMHG | DIASTOLIC BLOOD PRESSURE: 75 MMHG | RESPIRATION RATE: 19 BRPM | TEMPERATURE: 97.1 F | RESPIRATION RATE: 12 BRPM

## 2024-11-25 PROBLEM — K21.9 GASTROESOPHAGEAL REFLUX DISEASE: Status: ACTIVE | Noted: 2024-11-26

## 2024-11-25 PROBLEM — Z86.010 PERSONAL HISTORY OF COLONIC POLYPS: Status: ACTIVE | Noted: 2024-11-26

## 2024-11-26 ENCOUNTER — AMBULATORY SURGICAL CENTER (OUTPATIENT)
Age: 49
End: 2024-11-26
Payer: COMMERCIAL

## 2024-11-26 ENCOUNTER — OFFICE (OUTPATIENT)
Age: 49
End: 2024-11-26
Payer: COMMERCIAL

## 2024-11-26 ENCOUNTER — OFFICE (OUTPATIENT)
Dept: URBAN - METROPOLITAN AREA PATHOLOGY 4 | Facility: PATHOLOGY | Age: 49
End: 2024-11-26
Payer: COMMERCIAL

## 2024-11-26 ENCOUNTER — AMBULATORY SURGICAL CENTER (OUTPATIENT)
Dept: URBAN - METROPOLITAN AREA SURGERY 17 | Facility: SURGERY | Age: 49
End: 2024-11-26
Payer: COMMERCIAL

## 2024-11-26 DIAGNOSIS — D12.3 BENIGN NEOPLASM OF TRANSVERSE COLON: ICD-10-CM

## 2024-11-26 DIAGNOSIS — D12.8 BENIGN NEOPLASM OF RECTUM: ICD-10-CM

## 2024-11-26 DIAGNOSIS — Z09 ENCOUNTER FOR FOLLOW-UP EXAMINATION AFTER COMPLETED TREATMEN: ICD-10-CM

## 2024-11-26 DIAGNOSIS — K21.9 GASTRO-ESOPHAGEAL REFLUX DISEASE WITHOUT ESOPHAGITIS: ICD-10-CM

## 2024-11-26 DIAGNOSIS — K31.89 OTHER DISEASES OF STOMACH AND DUODENUM: ICD-10-CM

## 2024-11-26 DIAGNOSIS — K90.0 CELIAC DISEASE: ICD-10-CM

## 2024-11-26 DIAGNOSIS — K29.80 DUODENITIS WITHOUT BLEEDING: ICD-10-CM

## 2024-11-26 DIAGNOSIS — Z86.0101 PERSONAL HISTORY OF ADENOMATOUS AND SERRATED COLON POLYPS: ICD-10-CM

## 2024-11-26 DIAGNOSIS — D50.9 IRON DEFICIENCY ANEMIA, UNSPECIFIED: ICD-10-CM

## 2024-11-26 LAB
GI HISTOLOGY: A. SECOND PART OF THE DUODENUM: (no result)
GI HISTOLOGY: B. DUODENAL BULB: (no result)
GI HISTOLOGY: C. STOMACH ANTRUM: (no result)
GI HISTOLOGY: D. HEPATIC FLEXURE: (no result)
GI HISTOLOGY: E. RECTUM: (no result)
GI HISTOLOGY: PDF REPORT: (no result)

## 2024-11-26 PROCEDURE — 45380 COLONOSCOPY AND BIOPSY: CPT | Mod: 33 | Performed by: INTERNAL MEDICINE

## 2024-11-26 PROCEDURE — 88305 TISSUE EXAM BY PATHOLOGIST: CPT | Performed by: PATHOLOGY

## 2024-11-26 PROCEDURE — 88342 IMHCHEM/IMCYTCHM 1ST ANTB: CPT | Performed by: PATHOLOGY

## 2024-11-26 PROCEDURE — 43239 EGD BIOPSY SINGLE/MULTIPLE: CPT | Performed by: INTERNAL MEDICINE

## 2025-01-23 PROBLEM — I77.9 DISORDER OF ARTERIES AND ARTERIOLES, UNSPECIFIED: Status: ACTIVE | Noted: 2025-01-23

## 2025-01-23 PROBLEM — K76.0 FATTY LIVER: Status: ACTIVE | Noted: 2024-11-21

## 2025-01-23 PROBLEM — I10 HIGH BLOOD PRESSURE: Status: ACTIVE | Noted: 2025-01-23

## 2025-01-23 PROBLEM — I12.9 BENIGN HYPERTENSION WITH CHRONIC KIDNEY DISEASE: Status: ACTIVE | Noted: 2022-08-12

## 2025-01-23 PROBLEM — E11.21 DIABETIC NEPHROPATHY ASSOCIATED WITH TYPE 2 DIABETES MELLITUS: Status: ACTIVE | Noted: 2021-10-26

## 2025-01-23 PROBLEM — K62.1 RECTAL POLYP: Status: ACTIVE | Noted: 2021-11-15

## 2025-01-23 PROBLEM — I83.812 VARICOSE VEINS OF LEFT LOWER EXTREMITY WITH PAIN: Status: ACTIVE | Noted: 2017-09-07

## 2025-01-23 PROBLEM — K63.5 POLYP OF COLON: Status: ACTIVE | Noted: 2021-11-15

## 2025-01-23 PROBLEM — K62.5 RECTAL BLEEDING: Status: ACTIVE | Noted: 2021-11-15

## 2025-01-23 PROBLEM — K22.89 OTHER SPECIFIED DISEASE OF ESOPHAGUS: Status: ACTIVE | Noted: 2022-01-31

## 2025-01-23 PROBLEM — E78.5 DYSLIPIDEMIA: Status: ACTIVE | Noted: 2023-02-20

## 2025-01-23 PROBLEM — R93.3 ABNORMAL FINDINGS ON DIAGNOSTIC IMAGING OF OTHER PARTS OF DIGESTIVE TRACT: Status: ACTIVE | Noted: 2024-11-21

## 2025-01-23 PROBLEM — Z86.0100 HISTORY OF COLONIC POLYPS: Status: ACTIVE | Noted: 2024-11-26

## 2025-01-23 PROBLEM — E78.00 PURE HYPERCHOLESTEROLEMIA: Status: ACTIVE | Noted: 2025-01-23

## 2025-01-23 PROBLEM — I77.9 DISORDER OF ARTERIES AND ARTERIOLES, UNSPECIFIED: Status: ACTIVE | Noted: 2024-11-21

## 2025-01-23 PROBLEM — Z80.0 FAMILY HISTORY OF MALIGNANT NEOPLASM OF DIGESTIVE ORGANS: Status: ACTIVE | Noted: 2025-01-23

## 2025-01-23 PROBLEM — K57.30 DVRTCLOS OF LG INT W/O PERFORATION OR ABSCESS W/O BLEEDING: Status: ACTIVE | Noted: 2021-11-15

## 2025-01-23 PROBLEM — K64.0 GRADE I HEMORRHOIDS: Status: ACTIVE | Noted: 2021-11-15

## 2025-01-23 PROBLEM — R31.29 OTHER MICROSCOPIC HEMATURIA: Status: ACTIVE | Noted: 2024-11-21

## 2025-01-23 PROBLEM — E11.21 DIABETIC NEPHROPATHY ASSOCIATED WITH TYPE 2 DIABETES MELLITUS: Chronic | Status: ACTIVE | Noted: 2021-10-26

## 2025-01-23 PROBLEM — L98.9 SKIN LESION: Status: ACTIVE | Noted: 2021-09-03

## 2025-01-23 PROBLEM — E07.9 THYROID DISEASE: Status: ACTIVE | Noted: 2025-01-23

## 2025-01-23 PROBLEM — I83.93 VARICOSE VEINS OF BOTH LOWER EXTREMITIES WITHOUT ULCER OR INFLAMMATION: Status: ACTIVE | Noted: 2019-06-25

## 2025-01-23 PROBLEM — R80.9 MICROALBUMINURIA: Status: ACTIVE | Noted: 2021-10-26

## 2025-01-23 PROBLEM — K62.5 HEMORRHAGE OF ANUS AND RECTUM: Status: ACTIVE | Noted: 2021-11-15

## 2025-01-23 PROBLEM — E11.649: Status: ACTIVE | Noted: 2018-05-11

## 2025-01-23 PROBLEM — Z80.0 FAMILY HISTORY OF PANCREATIC CANCER: Status: ACTIVE | Noted: 2021-12-06

## 2025-01-23 PROBLEM — G56.00 CARPAL TUNNEL SYNDROME: Status: ACTIVE | Noted: 2018-11-16

## 2025-01-23 PROBLEM — R93.3 ABNORMAL FINDINGS ON DIAGNOSTIC IMAGING OF OTHER PARTS OF DIGESTIVE TRACT: Status: ACTIVE | Noted: 2025-01-23

## 2025-01-23 PROBLEM — K75.81 NONALCOHOLIC STEATOHEPATITIS (NASH): Status: ACTIVE | Noted: 2024-11-21

## 2025-01-23 PROBLEM — K86.2 CYST OF PANCREAS: Status: ACTIVE | Noted: 2024-11-21

## 2025-01-23 PROBLEM — D12.3 BENIGN NEOPLASM OF TRANSVERSE COLON: Status: ACTIVE | Noted: 2024-11-26

## 2025-01-23 PROBLEM — R94.5 ABNORMAL RESULTS OF LIVER FUNCTION STUDIES: Status: ACTIVE | Noted: 2024-11-21

## 2025-01-23 PROBLEM — R80.1 PERSISTENT PROTEINURIA: Status: ACTIVE | Noted: 2022-08-12

## 2025-01-23 PROBLEM — K29.71 GASTRITIS, UNSPECIFIED, WITH BLEEDING: Status: ACTIVE | Noted: 2025-01-23

## 2025-01-23 PROBLEM — K57.30 DIVERTICULOSIS OF LARGE INTESTINE WITHOUT PERFORATION OR ABSCESS WITHOUT BLEEDING: Status: ACTIVE | Noted: 2021-11-15

## 2025-01-23 PROBLEM — R10.9: Status: ACTIVE | Noted: 2021-07-09

## 2025-01-23 PROBLEM — Z79.01 LONG TERM CURRENT USE OF ANTICOAGULANT: Status: ACTIVE | Noted: 2024-11-21

## 2025-01-23 PROBLEM — K31.89 OTHER DISEASES OF STOMACH AND DUODENUM: Status: ACTIVE | Noted: 2022-01-31

## 2025-01-23 PROBLEM — D50.9 IRON DEFICIENCY ANEMIA: Chronic | Status: ACTIVE | Noted: 2018-05-02

## 2025-01-23 PROBLEM — Z96.41 PRESENCE OF INSULIN PUMP: Status: ACTIVE | Noted: 2019-04-26

## 2025-01-25 ENCOUNTER — TELEPHONE (OUTPATIENT)
Dept: URGENT CARE | Facility: CLINIC | Age: 50
End: 2025-01-25
Payer: COMMERCIAL

## 2025-01-25 DIAGNOSIS — B37.0 CANDIDIASIS OF MOUTH: Primary | ICD-10-CM

## 2025-01-25 RX ORDER — FLUCONAZOLE 150 MG/1
150 TABLET ORAL TAKE AS DIRECTED
Qty: 2 TABLET | Refills: 0 | Status: SHIPPED | OUTPATIENT
Start: 2025-01-25

## 2025-01-25 NOTE — TELEPHONE ENCOUNTER
Called, line rang, no answer, left voicemail to call back at earliest convenience regarding test results. This is the first documented attempt to reach this patient regarding his test results.     -John Cooksey, PA-C

## 2025-01-25 NOTE — TELEPHONE ENCOUNTER
Patient called us back, he confirmed his date of birth. Relayed mixed urogenital luis alfredo, likely not bacterial Urinary Tract Infection. Spoke with patient, progression to fungal infection in mouth, sent Rx DIFLUCAN for continued course of treatment to [facility]. Patient reports no further questions at this time, recommended he call us back if he has any. Patient expressed understanding and agreement to all of the above.     -John Cooksey, PA-C

## 2025-02-06 ENCOUNTER — TELEPHONE (OUTPATIENT)
Dept: URGENT CARE | Facility: CLINIC | Age: 50
End: 2025-02-06
Payer: COMMERCIAL

## 2025-02-07 NOTE — TELEPHONE ENCOUNTER
Called, line rang, no answer, left voicemail to call back at earliest convenience regarding lab results. This is the first documented attempt to reach this patient regarding his lab results.     -John Cooksey, PA-C

## 2025-02-07 NOTE — TELEPHONE ENCOUNTER
Patient called us back and confirmed his date of birth. Relayed Urine Culture positive for Enterococcus faecalis bacteria, susceptible to Levofloxacin, LEVAQUIN antibiotic. Recommended taking as prescribed from [facility]. He reports no further questions at this time, recommended he call us back if he has any. Patient expressed understanding and agreement to all of the above.     -John Cooksey, PA-C

## 2025-02-12 RX ORDER — NITROGLYCERIN 0.4 MG/1
0.4 TABLET SUBLINGUAL AS NEEDED
Qty: 100 TABLET | Refills: 3 | Status: SHIPPED | OUTPATIENT
Start: 2025-02-12

## 2025-07-09 RX ORDER — NITROGLYCERIN 0.4 MG/1
0.4 TABLET SUBLINGUAL AS NEEDED
Qty: 100 TABLET | Refills: 3 | Status: SHIPPED | OUTPATIENT
Start: 2025-07-09

## 2025-07-31 ENCOUNTER — TELEPHONE (OUTPATIENT)
Dept: CARDIOLOGY | Age: 50
End: 2025-07-31
Payer: COMMERCIAL

## 2025-07-31 NOTE — TELEPHONE ENCOUNTER
Received PA request for Telmisartan 80 mg from [email or web address] on [address].  [phone].  GRIMES JUAN.  I read in the chart that Racheal has previously written for( [facility] [employer]).  I have called twice and left a message each time for patient to call me as I need to know what he wishes to refill./ RODGER

## 2025-07-31 NOTE — TELEPHONE ENCOUNTER
Patient returned the call.  He states he is taking Telmisartan 80 mg and Valsartan 40 mg.  (Please see Dr. Temple's note from 5/22/25 due to some problems taking this med and insurance covering as well.     Patient last seen by Dr. Horn  8/15/24.   He is scheduled for a follow up on 8/21/25. He asked if we would also fill Nitro, Baby ASA and Effient.  Please advise. / RODGER

## 2025-08-01 RX ORDER — TELMISARTAN 80 MG/1
80 TABLET ORAL DAILY
Qty: 90 TABLET | Refills: 3 | Status: SHIPPED | OUTPATIENT
Start: 2025-08-01

## 2025-08-01 RX ORDER — VALSARTAN 40 MG/1
40 TABLET ORAL DAILY
Qty: 90 TABLET | Refills: 3 | Status: SHIPPED | OUTPATIENT
Start: 2025-08-01

## 2025-08-01 NOTE — TELEPHONE ENCOUNTER
Called and spoke with patient.  Updated medication list.  I sent telmisartan 80 mg tablet and valsartan 40 mg tablet.  He reportedly has been on this regimen for 2 years.    We may still need to do PA forms

## 2025-08-01 NOTE — TELEPHONE ENCOUNTER
He should not be taking valsartan with telmisartan.     Is he by chance taking telmisartan 80  and telmisartan 40 mg to total 120 mg daily as listed on medication list ?

## 2025-08-21 ENCOUNTER — OFFICE VISIT (OUTPATIENT)
Dept: CARDIOLOGY | Facility: CLINIC | Age: 50
End: 2025-08-21
Payer: COMMERCIAL

## 2025-08-21 ENCOUNTER — LAB (OUTPATIENT)
Dept: LAB | Facility: HOSPITAL | Age: 50
End: 2025-08-21
Payer: COMMERCIAL

## 2025-08-21 VITALS
BODY MASS INDEX: 42.37 KG/M2 | HEIGHT: 74 IN | DIASTOLIC BLOOD PRESSURE: 84 MMHG | HEART RATE: 71 BPM | SYSTOLIC BLOOD PRESSURE: 136 MMHG

## 2025-08-21 DIAGNOSIS — E11.59 TYPE 2 DIABETES MELLITUS WITH OTHER CIRCULATORY COMPLICATION, WITH LONG-TERM CURRENT USE OF INSULIN: ICD-10-CM

## 2025-08-21 DIAGNOSIS — E78.49 OTHER HYPERLIPIDEMIA: ICD-10-CM

## 2025-08-21 DIAGNOSIS — I10 ESSENTIAL HYPERTENSION: ICD-10-CM

## 2025-08-21 DIAGNOSIS — Z79.4 TYPE 2 DIABETES MELLITUS WITH OTHER CIRCULATORY COMPLICATION, WITH LONG-TERM CURRENT USE OF INSULIN: ICD-10-CM

## 2025-08-21 DIAGNOSIS — I25.10 CORONARY ARTERIOSCLEROSIS IN NATIVE ARTERY: Primary | ICD-10-CM

## 2025-08-21 DIAGNOSIS — Z95.5 PRESENCE OF STENT IN CORONARY ARTERY: ICD-10-CM

## 2025-08-21 PROCEDURE — 1159F MED LIST DOCD IN RCRD: CPT | Performed by: INTERNAL MEDICINE

## 2025-08-21 PROCEDURE — 1160F RVW MEDS BY RX/DR IN RCRD: CPT | Performed by: INTERNAL MEDICINE

## 2025-08-21 PROCEDURE — 3079F DIAST BP 80-89 MM HG: CPT | Performed by: INTERNAL MEDICINE

## 2025-08-21 PROCEDURE — 93000 ELECTROCARDIOGRAM COMPLETE: CPT | Performed by: INTERNAL MEDICINE

## 2025-08-21 PROCEDURE — 3075F SYST BP GE 130 - 139MM HG: CPT | Performed by: INTERNAL MEDICINE

## 2025-08-21 PROCEDURE — 99214 OFFICE O/P EST MOD 30 MIN: CPT | Performed by: INTERNAL MEDICINE

## 2025-08-21 RX ORDER — VALSARTAN 40 MG/1
40 TABLET ORAL DAILY
Qty: 90 TABLET | Refills: 3 | Status: SHIPPED | OUTPATIENT
Start: 2025-08-21

## 2025-08-21 RX ORDER — ISOSORBIDE MONONITRATE 30 MG/1
30 TABLET, EXTENDED RELEASE ORAL 2 TIMES DAILY
Qty: 180 TABLET | Refills: 3 | Status: SHIPPED | OUTPATIENT
Start: 2025-08-21

## 2025-08-21 RX ORDER — METOPROLOL SUCCINATE 100 MG/1
100 TABLET, EXTENDED RELEASE ORAL DAILY
Qty: 90 TABLET | Refills: 3 | Status: SHIPPED | OUTPATIENT
Start: 2025-08-21

## 2025-08-21 RX ORDER — PRASUGREL 10 MG/1
10 TABLET, FILM COATED ORAL DAILY
Qty: 90 TABLET | Refills: 3 | Status: SHIPPED | OUTPATIENT
Start: 2025-08-21

## 2025-08-21 RX ORDER — EVOLOCUMAB 140 MG/ML
140 INJECTION, SOLUTION SUBCUTANEOUS
Qty: 6 ML | Refills: 3 | Status: SHIPPED | OUTPATIENT
Start: 2025-08-21

## 2025-08-21 RX ORDER — TELMISARTAN 80 MG/1
80 TABLET ORAL DAILY
Qty: 90 TABLET | Refills: 3 | Status: SHIPPED | OUTPATIENT
Start: 2025-08-21

## 2025-08-21 RX ORDER — EZETIMIBE 10 MG/1
10 TABLET ORAL DAILY
Qty: 90 TABLET | Refills: 3 | Status: SHIPPED | OUTPATIENT
Start: 2025-08-21

## 2025-08-21 RX ORDER — ATORVASTATIN CALCIUM 80 MG/1
80 TABLET, FILM COATED ORAL DAILY
Qty: 90 TABLET | Refills: 3 | Status: SHIPPED | OUTPATIENT
Start: 2025-08-21

## 2025-08-24 PROBLEM — I10 HYPERTENSION: Status: ACTIVE | Noted: 2021-10-26

## 2025-08-24 PROBLEM — K90.0 CELIAC DISEASE: Status: ACTIVE | Noted: 2025-05-22

## 2025-08-24 PROBLEM — D64.9 ABSOLUTE ANEMIA: Chronic | Status: ACTIVE | Noted: 2025-04-30

## (undated) DEVICE — CATH IMG DRAGONFLY OPTIS 2.7F 135CM

## (undated) DEVICE — NC TREK CORONARY DILATATION CATHETER 3.5 MM X 20 MM / RAPID-EXCHANGE: Brand: NC TREK

## (undated) DEVICE — NC TREK CORONARY DILATATION CATHETER 3.5 MM X 15 MM / RAPID-EXCHANGE: Brand: NC TREK

## (undated) DEVICE — GW EMR FIX EXCHG J STD .035 3MM 260CM

## (undated) DEVICE — CATH DIAG IMPULSE FL3.5 5F 100CM

## (undated) DEVICE — RUNTHROUGH NS EXTRA FLOPPY PTCA GUIDEWIRE: Brand: RUNTHROUGH

## (undated) DEVICE — CATH DIAG IMPULSE PIG 5F 100CM

## (undated) DEVICE — NC TREK CORONARY DILATATION CATHETER 3.75 MM X 12 MM / RAPID-EXCHANGE: Brand: NC TREK

## (undated) DEVICE — GLIDESHEATH BASIC HYDROPHILIC COATED INTRODUCER SHEATH: Brand: GLIDESHEATH

## (undated) DEVICE — DEV INDEFLATOR P/N 580289

## (undated) DEVICE — CATH VENT MIV RADL PIG ST TIP 4F 110CM

## (undated) DEVICE — TREK CORONARY DILATATION CATHETER 3.50 MM X 12 MM / RAPID-EXCHANGE: Brand: TREK

## (undated) DEVICE — 6F .070 JR 4 100CM: Brand: CORDIS

## (undated) DEVICE — GLIDESHEATH SLENDER STAINLESS STEEL KIT: Brand: GLIDESHEATH SLENDER

## (undated) DEVICE — KT MANIFLD CARDIAC

## (undated) DEVICE — NC TREK CORONARY DILATATION CATHETER 3.75 MM X 20 MM / RAPID-EXCHANGE: Brand: NC TREK

## (undated) DEVICE — PK CATH CARD 40

## (undated) DEVICE — 6F .070 XB 3.5 100CM: Brand: VISTA BRITE TIP

## (undated) DEVICE — GW INQWIRE FC PTFE J/3MM .035 180

## (undated) DEVICE — CATH DIAG IMPULSE FR5 5F 100CM